# Patient Record
Sex: FEMALE | Race: WHITE | NOT HISPANIC OR LATINO | Employment: OTHER | URBAN - METROPOLITAN AREA
[De-identification: names, ages, dates, MRNs, and addresses within clinical notes are randomized per-mention and may not be internally consistent; named-entity substitution may affect disease eponyms.]

---

## 2017-08-29 ENCOUNTER — TRANSCRIBE ORDERS (OUTPATIENT)
Dept: SLEEP CENTER | Facility: CLINIC | Age: 47
End: 2017-08-29

## 2017-08-29 DIAGNOSIS — G47.411 NARCOLEPSY AND CATAPLEXY: Primary | ICD-10-CM

## 2017-10-03 ENCOUNTER — HOSPITAL ENCOUNTER (OUTPATIENT)
Dept: SLEEP CENTER | Facility: CLINIC | Age: 47
Discharge: HOME | End: 2017-10-03
Payer: COMMERCIAL

## 2017-10-03 ENCOUNTER — TRANSCRIBE ORDERS (OUTPATIENT)
Dept: SLEEP CENTER | Facility: CLINIC | Age: 47
End: 2017-10-03

## 2017-10-03 DIAGNOSIS — G47.411 NARCOLEPSY AND CATAPLEXY: ICD-10-CM

## 2017-10-03 DIAGNOSIS — G47.33 OSA (OBSTRUCTIVE SLEEP APNEA): Primary | ICD-10-CM

## 2017-10-03 NOTE — PROGRESS NOTES
Progress Note - [facility]   Donn Miller MRN: [id number]      Reason for Visit:    41 y  o female within narcolepsy, cataplexy and depression    Assessment:  The patient reports an improvement in her affect, however she still has difficulty getting out of bed in the morning  She feels lethargic and too overwhelmed to accomplish daily tasks  That is most likely her depression rather than narcolepsy  She seems well controlled on Xyrem and Adderall XR  Her last cataplexy was over 2 months ago at a high school football game  She remains on venlafaxine and clonazepam per Dr Luma Chao for her depression and anxiety  Plan:  Continue current regimen    Follow up:  Four months    History of Present Illness: The patient is narcolepsy was we reasonably well controlled on Xyrem and Adderall XR  She was involved in a lawsuit which caused a motion decompensation and resulted in increased frequency of cataplexy  That has since improved on her current regimen  Historical Information    Past Medical History: No past medical history on file  Past Surgical History: No past surgical history on file  Social History - see chart  History   Alcohol use: Not on file     History   Drug Use Not on file     History   Smoking Status    Not on file   Smokeless Tobacco    Not on file     Family History: No family history on file  Medications/Allergies:    No current outpatient prescriptions on file  Objective    Vital Signs:   See Chart    Physical Exam:    General: Alert, appropriate, cooperative, overweight    Head: NC/AT    Skin: Warm, dry    Neuro: No motor abnormalities, cranial nerves appear intact    Psych: Normal affect      Counseling / Coordination of Care  Total clinic time spent today 15 minutes  Greater than 50% of total time was spent with the patient and / or family counseling and / or coordination of care   A description of the counseling / coordination of care: treatment was discussed    PARUL Ragland    Board Certified Sleep Specialist

## 2018-05-05 ENCOUNTER — HOSPITAL ENCOUNTER (EMERGENCY)
Facility: HOSPITAL | Age: 48
Discharge: HOME | End: 2018-05-05
Attending: EMERGENCY MEDICINE
Payer: COMMERCIAL

## 2018-05-05 VITALS
HEIGHT: 65 IN | WEIGHT: 239.64 LBS | SYSTOLIC BLOOD PRESSURE: 118 MMHG | DIASTOLIC BLOOD PRESSURE: 62 MMHG | BODY MASS INDEX: 39.93 KG/M2 | OXYGEN SATURATION: 97 % | RESPIRATION RATE: 20 BRPM | TEMPERATURE: 98.5 F | HEART RATE: 94 BPM

## 2018-05-05 DIAGNOSIS — M25.519 SHOULDER PAIN: ICD-10-CM

## 2018-05-05 DIAGNOSIS — M75.22 BICEPS TENDONITIS ON LEFT: Primary | ICD-10-CM

## 2018-05-05 PROCEDURE — 99283 EMERGENCY DEPT VISIT LOW MDM: CPT

## 2018-05-05 PROCEDURE — 96372 THER/PROPH/DIAG INJ SC/IM: CPT

## 2018-05-05 RX ORDER — VENLAFAXINE 75 MG/1
75 TABLET ORAL 3 TIMES DAILY
COMMUNITY
End: 2019-05-03 | Stop reason: SDUPTHER

## 2018-05-05 RX ORDER — BUPROPION HYDROCHLORIDE 100 MG/1
100 TABLET ORAL 3 TIMES DAILY
COMMUNITY
End: 2019-01-02 | Stop reason: ALTCHOICE

## 2018-05-05 RX ORDER — CLONAZEPAM 0.5 MG/1
0.5 TABLET ORAL 4 TIMES DAILY
COMMUNITY
End: 2020-06-03

## 2018-05-05 RX ORDER — VENLAFAXINE 100 MG/1
100 TABLET ORAL 3 TIMES DAILY
COMMUNITY
End: 2019-05-03

## 2018-05-05 RX ORDER — NAPROXEN 500 MG/1
500 TABLET ORAL 2 TIMES DAILY WITH MEALS
Qty: 6 TABLET | Refills: 0 | Status: SHIPPED | OUTPATIENT
Start: 2018-05-05 | End: 2018-10-16

## 2018-05-05 RX ORDER — GABAPENTIN 300 MG/1
100 CAPSULE ORAL 4 TIMES DAILY
COMMUNITY
End: 2019-10-02

## 2018-05-05 RX ORDER — KETOROLAC TROMETHAMINE 30 MG/ML
15 INJECTION, SOLUTION INTRAMUSCULAR; INTRAVENOUS ONCE
Status: COMPLETED | OUTPATIENT
Start: 2018-05-05 | End: 2018-05-05

## 2018-05-05 RX ADMIN — KETOROLAC TROMETHAMINE 15 MG: 30 INJECTION, SOLUTION INTRAMUSCULAR at 17:14

## 2018-05-05 NOTE — DISCHARGE INSTRUCTIONS
Nephrology Tendinitis   WHAT YOU NEED TO KNOW:   What is tendinitis? Tendinitis is painful inflammation or breakdown of your tendons  It may also be called tendinopathy  Tendinitis often occurs in the knee, shoulder, ankle, hip, or elbow  What increases my risk for tendinitis? · Injury, overuse, or repeated movement of a joint     · Not warming up before exercise, or not resting enough between activities    · Use of shoes or exercise equipment that do not fit well    · Muscle weakness, balance problems, or poor posture  What are the signs and symptoms of tendinitis? You may have redness, pain, or swelling around your joint, tendon, or muscle  You may also have pain, stiffness, or decreased movement in your joint  How is tendinitis diagnosed? Your healthcare provider will check your range of motion of the affected joint  He may also lightly press on your tendon to check for pain  You may also need an ultrasound, x-ray, or MRI to show if you have tendinitis or another condition  How is tendinitis treated? · Pain medicines  such as NSAIDs and acetaminophen may decrease swelling and pain  These medicines are available without a doctor's order  Ask how much to take and when to take it  Follow directions  NSAIDs and acetaminophen may cause liver or kidney damage if not taken correctly  · Steroids  may be used to decrease pain and swelling  They may be given as a pill or as an injection into the affected area  Steroids are rarely used in children  · Surgery  may rarely be needed if other treatment does not work  How can I manage my symptoms? · Rest  your tendon as directed to help it heal  Ask your healthcare provider if you need to stop putting weight on your affected area  · Ice  helps decrease swelling and pain, and may help prevent tissue damage  Use an ice pack, or put crushed ice in a plastic bag   Cover it with a towel and place it on the affected area for 10 to 15 minutes every hour or as directed  · Support devices  such as a cane, splint, shoe insert, or brace may help reduce your pain  · Physical therapy  may be ordered by your healthcare provider  This may be used to teach you exercises to help improve movement and strength, and to decrease pain  You may also learn how to improve your posture, and how to lift or exercise correctly  How can I prevent tendinitis? · Warm up or stretch  before you exercise  · Exercise regularly  to strengthen the muscles around your joint  Ease into an exercise routine for the first 3 weeks to prevent another injury  Ask your healthcare provider about the best exercise plan for you  Rest fully between activities  · Use the right equipment  for sports and exercise  Wear braces or tape around weak joints as directed  When should I contact my healthcare provider? · You have increased pain even after you take medicine  · You have questions or concerns about your condition or care  When should I seek immediate help? · You have increased redness over the joint, or swelling in the joint  · You suddenly cannot move your joint  CARE AGREEMENT:   You have the right to help plan your care  Learn about your health condition and how it may be treated  Discuss treatment options with your caregivers to decide what care you want to receive  You always have the right to refuse treatment  The above information is an  only  It is not intended as medical advice for individual conditions or treatments  Talk to your doctor, nurse or pharmacist before following any medical regimen to see if it is safe and effective for you  © 2017 [employer]  Information is for End User's use only and may not be sold, redistributed or otherwise used for commercial purposes  All illustrations and images included in CareNotes® are the copyrighted property of [employer]  or [employer]

## 2018-05-05 NOTE — ED ATTENDING ATTESTATION
I, Miguel Angel Núñez DO, saw and evaluated the patient  I have discussed the patient with the resident/non-physician practitioner and agree with the resident's/non-physician practitioner's findings, Plan of Care, and MDM as documented in the resident's/non-physician practitioner's note, except where noted  All available labs and Radiology studies were reviewed  At this point I agree with the current assessment done in the Emergency Department  I have conducted an independent evaluation of this patient a history and physical is as follows:      Critical Care Time  CritCare Time    Procedures      61-year-old female presents with left shoulder pain  , more diffuse over the entire shoulder anterior superior and posterior aspects of it  , has been developing slowly over the past 2 weeks  No history of injury  No falls  Has appoint with Ortho in 2 days  , came here due to over-the-counter Tylenol and ibuprofen which have not been helping  Describes pain as dull constant worse with motion better with rest currently a 2/10  On examination, patient is nontoxic in appearance, patient has full active range of motion of all joints with the exception of the left shoulder where she has limited active range of motion due to discomfort  , patient has full passive range of motion of the left shoulder without any discomfort  , as soon as she engages actively above 90° with AB duction she has significant discomfort  Will treat as a tendinitis, will place on high-dose Naprosyn, patient to follow with Orthopedics  , discussed the possibility of steroids but due to psychiatric history I do think systemic steroids would be a poor choice    If   Orthopedics  feels that steroids are indicated I believe a local steroid injection would be more beneficial

## 2018-05-05 NOTE — ED PROVIDER NOTES
History  Chief Complaint   Patient presents with    Arm Pain     PT PRESENTS WITH LEFT SHOULDER/ARM PAIN X 2 WEEKS  STATES SHE WOKE UP WITH PAIN IN LEFT SHOULDER PAIN 2 WEEKS AGO, UNSURE IF SHE INJURED IT  STATES PAIN RADIATES TO LEFT SHOULDER BLADE AND DOWN ARM, MOSTLY LEFT BICEP  STATES TODAY HER ARM SUDDENLY "LOCKED UP" AND COULDNT MOVE FOR APPROX 10 MIN WITH "EXCRUCIATING PAIN" IN BICEP" DENIES CP, SOB     28-year-old female with no relevant medical history presents to the emergency department complaining of left shoulder and arm pain x2 weeks  States she does not recall the exact circumstances during the onset of the pain, but it has been gradually worsening since its onset  No injury, fall, including remote injury  Says that any movement of the shoulder makes the pain worse  The pain is in several locations: the posterior trapezius, the circumferential humeral head, and the coracoid process  She has been using Tylenol, Motrin, and Aleve at home  Today, she had an episode where her biceps "locked up" in the flexed position for about ten minutes resulting in "10/10 pain"  Applied some ice and was eventually able to extend her arm  Has an appt on Monday with ortho but was in such pain from the previous episode that she comes into the ED today  DDX includes but is not limited to:  Bursitis, tendinitis, muscle spasm, arthritis, rotator cuff tear            Prior to Admission Medications   Prescriptions Last Dose Informant Patient Reported? Taking?    Sodium Oxybate 500 MG/ML SOLN   Yes Yes   Sig: Take by mouth 2 (two) times a day   buPROPion (WELLBUTRIN) 100 mg tablet   Yes Yes   Sig: Take 100 mg by mouth 3 (three) times a day   clonazePAM (KlonoPIN) 0 5 mg tablet   Yes Yes   Sig: Take 0 5 mg by mouth 4 (four) times a day   gabapentin (NEURONTIN) 300 mg capsule   Yes Yes   Sig: Take 100 mg by mouth 4 (four) times a day   venlafaxine (EFFEXOR) 100 MG tablet   Yes Yes   Sig: Take 100 mg by mouth 3 (three) times a day   venlafaxine (EFFEXOR) 75 mg tablet   Yes Yes   Sig: Take 75 mg by mouth 3 (three) times a day      Facility-Administered Medications: None       Past Medical History:   Diagnosis Date    Anxiety     Depression     Narcolepsy        Past Surgical History:   Procedure Laterality Date     SECTION      CHOLECYSTECTOMY      KNEE ARTHROSCOPY Left        No family history on file  I have reviewed and agree with the history as documented  Social History   Substance Use Topics    Smoking status: Never Smoker    Smokeless tobacco: Never Used    Alcohol use Not on file        Review of Systems   Constitutional: Negative for chills and fever  HENT: Negative for congestion and rhinorrhea  Eyes: Negative for visual disturbance  Respiratory: Negative for cough, chest tightness and shortness of breath  Cardiovascular: Negative for chest pain  Gastrointestinal: Negative for abdominal pain  Musculoskeletal: Positive for arthralgias  Skin: Negative for rash and wound  Neurological: Negative for dizziness, light-headedness and headaches  All other systems reviewed and are negative  Physical Exam  ED Triage Vitals [18 1606]   Temperature Pulse Respirations Blood Pressure SpO2   98 5 °F (36 9 °C) 94 20 118/62 97 %      Temp Source Heart Rate Source Patient Position - Orthostatic VS BP Location FiO2 (%)   Oral Monitor Sitting Right arm --      Pain Score       2           Orthostatic Vital Signs  Vitals:    18 1606   BP: 118/62   Pulse: 94   Patient Position - Orthostatic VS: Sitting       Physical Exam   Constitutional: She is oriented to person, place, and time  She appears well-developed and well-nourished  No distress  HENT:   Head: Normocephalic and atraumatic  Right Ear: External ear normal    Left Ear: External ear normal    Eyes: EOM are normal  Pupils are equal, round, and reactive to light  Neck: Normal range of motion  Neck supple     Cardiovascular: Normal rate, regular rhythm and normal heart sounds  Exam reveals no gallop and no friction rub  No murmur heard  Pulmonary/Chest: Effort normal and breath sounds normal  No respiratory distress  She has no wheezes  She has no rales  Abdominal: Soft  Musculoskeletal:   No deformity to inspection of the left shoulder  No rash  Tender to palpation over the anterior, superior, posterior aspect of the shoulder, as well as the proximal and distal insertions of the biceps tendons, and the upper arm in general     Range of motion is limited to 90° of abduction, relatively normal flexion and extension of the shoulder  Flexion and extension of the elbow is guarded because of pain  Normal range of motion of the wrist and fingers  Muscle strength the left arm is 4/5 in all ranges of motion of the shoulder and elbow  Unable to do rotator cuff testing or Yergason's test because of pain  Neurological: She is alert and oriented to person, place, and time  Skin: Skin is warm and dry  She is not diaphoretic  Psychiatric:   Anxious       ED Medications  Medications   ketorolac (TORADOL) injection 15 mg (15 mg Intramuscular Given 5/5/18 1714)       Diagnostic Studies  Results Reviewed     None                 No orders to display              Procedures  Procedures       Phone Contacts  ED Phone Contact    ED Course                               MDM  Number of Diagnoses or Management Options  Biceps tendonitis on left: new and requires workup  Shoulder pain: new and requires workup  Diagnosis management comments: 30-year-old female presenting for a traumatic pain of the left shoulder x2 weeks  The location of her pain is suggestive of biceps tendonitis, plus or minus co existing muscle strain or rotator cuff tear  She has [facility] appointment on Monday  Therefore, would not prescribe systemic corticosteroids  Will give an IM dose of Toradol and 3 day course of Naprosyn    Patient understands and agreed with the treatment plan and had no questions  Patient Progress  Patient progress: improved    CritCare Time    Disposition  Final diagnoses:   Biceps tendonitis on left   Shoulder pain     Time reflects when diagnosis was documented in both MDM as applicable and the Disposition within this note     Time User Action Codes Description Comment    5/5/2018  4:50 PM Idalia Miguel Add [[id number]] Biceps tendonitis on left     5/5/2018  4:51 PM Idalia Miguel Add [[id number]] Shoulder pain       ED Disposition     ED Disposition Condition Comment    Discharge  Suyapa Edwards discharge to home/self care  Condition at discharge: Good        Follow-up Information     Follow up With Specialties Details Why Contact Info    Orthopedics   Keep your scheduled appointment         Discharge Medication List as of 5/5/2018  4:53 PM      START taking these medications    Details   naproxen (NAPROSYN) 500 mg tablet Take 1 tablet (500 mg total) by mouth 2 (two) times a day with meals for 3 days, Starting Sat 5/5/2018, Until Tue 5/8/2018, Print         CONTINUE these medications which have NOT CHANGED    Details   buPROPion (WELLBUTRIN) 100 mg tablet Take 100 mg by mouth 3 (three) times a day, Historical Med      clonazePAM (KlonoPIN) 0 5 mg tablet Take 0 5 mg by mouth 4 (four) times a day, Historical Med      gabapentin (NEURONTIN) 300 mg capsule Take 100 mg by mouth 4 (four) times a day, Historical Med      Sodium Oxybate 500 MG/ML SOLN Take by mouth 2 (two) times a day, Historical Med      !! venlafaxine (EFFEXOR) 100 MG tablet Take 100 mg by mouth 3 (three) times a day, Historical Med      !! venlafaxine (EFFEXOR) 75 mg tablet Take 75 mg by mouth 3 (three) times a day, Historical Med       !! - Potential duplicate medications found  Please discuss with provider  No discharge procedures on file      ED Provider  Electronically Signed by           Kristin Mancia PA-C  05/05/18 3432

## 2018-05-14 ENCOUNTER — TRANSCRIBE ORDERS (OUTPATIENT)
Dept: ADMINISTRATIVE | Facility: HOSPITAL | Age: 48
End: 2018-05-14

## 2018-05-14 DIAGNOSIS — M75.52 BURSITIS OF LEFT SHOULDER: Primary | ICD-10-CM

## 2018-05-16 ENCOUNTER — HOSPITAL ENCOUNTER (OUTPATIENT)
Dept: RADIOLOGY | Age: 48
Discharge: HOME | End: 2018-05-16
Payer: COMMERCIAL

## 2018-05-16 DIAGNOSIS — M75.52 BURSITIS OF LEFT SHOULDER: ICD-10-CM

## 2018-05-16 PROCEDURE — 73221 MRI JOINT UPR EXTREM W/O DYE: CPT

## 2018-08-06 ENCOUNTER — TRANSCRIBE ORDERS (OUTPATIENT)
Dept: ADMINISTRATIVE | Facility: HOSPITAL | Age: 48
End: 2018-08-06

## 2018-08-07 ENCOUNTER — TRANSCRIBE ORDERS (OUTPATIENT)
Dept: ADMINISTRATIVE | Facility: HOSPITAL | Age: 48
End: 2018-08-07

## 2018-08-07 DIAGNOSIS — M77.12 LATERAL EPICONDYLITIS OF LEFT ELBOW: Primary | ICD-10-CM

## 2018-08-09 ENCOUNTER — HOSPITAL ENCOUNTER (OUTPATIENT)
Dept: MRI IMAGING | Facility: HOSPITAL | Age: 48
Discharge: HOME | End: 2018-08-09
Payer: COMMERCIAL

## 2018-08-09 DIAGNOSIS — M77.12 LATERAL EPICONDYLITIS OF LEFT ELBOW: ICD-10-CM

## 2018-08-09 PROCEDURE — 73221 MRI JOINT UPR EXTREM W/O DYE: CPT

## 2018-09-27 ENCOUNTER — OFFICE VISIT (OUTPATIENT)
Dept: INTERNAL MEDICINE CLINIC | Facility: CLINIC | Age: 48
End: 2018-09-27
Payer: COMMERCIAL

## 2018-09-27 VITALS
OXYGEN SATURATION: 98 % | WEIGHT: 215.8 LBS | BODY MASS INDEX: 35.96 KG/M2 | TEMPERATURE: 97.9 F | RESPIRATION RATE: 18 BRPM | SYSTOLIC BLOOD PRESSURE: 132 MMHG | DIASTOLIC BLOOD PRESSURE: 76 MMHG | HEIGHT: 65 IN | HEART RATE: 84 BPM

## 2018-09-27 DIAGNOSIS — M75.22 LEFT BICIPITAL TENOSYNOVITIS: ICD-10-CM

## 2018-09-27 DIAGNOSIS — Z00.00 LABORATORY EXAMINATION ORDERED AS PART OF A ROUTINE GENERAL MEDICAL EXAMINATION: ICD-10-CM

## 2018-09-27 DIAGNOSIS — J45.20 MILD INTERMITTENT ASTHMA WITHOUT COMPLICATION: ICD-10-CM

## 2018-09-27 DIAGNOSIS — Z98.84 S/P GASTRIC BYPASS: ICD-10-CM

## 2018-09-27 DIAGNOSIS — Z12.31 ENCOUNTER FOR SCREENING MAMMOGRAM FOR BREAST CANCER: ICD-10-CM

## 2018-09-27 DIAGNOSIS — E66.09 CLASS 2 OBESITY DUE TO EXCESS CALORIES WITHOUT SERIOUS COMORBIDITY WITH BODY MASS INDEX (BMI) OF 35.0 TO 35.9 IN ADULT: ICD-10-CM

## 2018-09-27 DIAGNOSIS — F41.9 ANXIETY: ICD-10-CM

## 2018-09-27 DIAGNOSIS — N92.1 MENOMETRORRHAGIA: ICD-10-CM

## 2018-09-27 DIAGNOSIS — M25.50 ARTHRALGIA, UNSPECIFIED JOINT: Primary | ICD-10-CM

## 2018-09-27 DIAGNOSIS — G47.411 PRIMARY NARCOLEPSY WITH CATAPLEXY: ICD-10-CM

## 2018-09-27 PROBLEM — M75.20 BICIPITAL TENOSYNOVITIS: Status: ACTIVE | Noted: 2018-06-04

## 2018-09-27 PROBLEM — M77.10 LATERAL EPICONDYLITIS: Status: ACTIVE | Noted: 2018-07-09

## 2018-09-27 PROBLEM — K21.9 GERD (GASTROESOPHAGEAL REFLUX DISEASE): Status: ACTIVE | Noted: 2018-09-27

## 2018-09-27 PROBLEM — M75.50 BURSITIS OF SHOULDER: Status: ACTIVE | Noted: 2018-05-07

## 2018-09-27 PROBLEM — M77.00 MEDIAL EPICONDYLITIS: Status: ACTIVE | Noted: 2018-07-09

## 2018-09-27 PROBLEM — G43.109 MIGRAINE WITH AURA AND WITHOUT STATUS MIGRAINOSUS, NOT INTRACTABLE: Status: ACTIVE | Noted: 2018-09-27

## 2018-09-27 PROBLEM — J30.1 SEASONAL ALLERGIC RHINITIS DUE TO POLLEN: Status: ACTIVE | Noted: 2018-09-27

## 2018-09-27 PROBLEM — E66.812 CLASS 2 OBESITY DUE TO EXCESS CALORIES WITHOUT SERIOUS COMORBIDITY WITH BODY MASS INDEX (BMI) OF 35.0 TO 35.9 IN ADULT: Status: ACTIVE | Noted: 2018-09-27

## 2018-09-27 PROBLEM — R42 VERTIGO: Status: ACTIVE | Noted: 2018-09-27

## 2018-09-27 PROCEDURE — 99204 OFFICE O/P NEW MOD 45 MIN: CPT | Performed by: INTERNAL MEDICINE

## 2018-09-27 PROCEDURE — 3008F BODY MASS INDEX DOCD: CPT | Performed by: INTERNAL MEDICINE

## 2018-09-27 RX ORDER — ALBUTEROL SULFATE 90 UG/1
1 AEROSOL, METERED RESPIRATORY (INHALATION) EVERY 6 HOURS PRN
Qty: 1 INHALER | Refills: 0 | Status: SHIPPED | OUTPATIENT
Start: 2018-09-27 | End: 2019-05-03 | Stop reason: ALTCHOICE

## 2018-09-27 NOTE — PROGRESS NOTES
Assessment/Plan:    Arthralgia  Acute onset, no recent viral illness  Check rheum labs  Bicipital tenosynovitis  Follow up with ortho, minimal improvement with physical therapy  Anxiety  Sees psychiatry regularly  On bupropion, venlafaxine, gabapentin and clonazepam     Menometrorrhagia  Encouraged to see gynecology  S/P gastric bypass  Check vitamin labs  Primary narcolepsy with cataplexy  Followed by neurology  On Xyrem  GERD (gastroesophageal reflux disease)  Takes Prilosec as needed  Seasonal allergic rhinitis due to pollen  Takes antihistamine as needed  Migraine with aura and without status migrainosus, not intractable  Headaches about 1 to 2 times a month, may be related to menstrual period  Diagnoses and all orders for this visit:    Arthralgia, unspecified joint  -     CBC and differential  -     Comprehensive metabolic panel  -     MILAGRO Screen w/ Reflex to Titer/Pattern  -     C-reactive protein  -     RF Screen w/ Reflex to Titer  -     Sedimentation rate, automated    Menometrorrhagia  -     Ambulatory referral to Obstetrics / Gynecology; Future    Left bicipital tenosynovitis    Anxiety    Primary narcolepsy with cataplexy    Class 2 obesity due to excess calories without serious comorbidity with body mass index (BMI) of 35 0 to 35 9 in adult  -     Lipid panel  -     TSH, 3rd generation with Free T4 reflex    Laboratory examination ordered as part of a routine general medical examination  -     CBC and differential  -     Comprehensive metabolic panel  -     Lipid panel  -     TSH, 3rd generation with Free T4 reflex    Encounter for screening mammogram for breast cancer  -     Mammo screening bilateral w cad;  Future    S/P gastric bypass  -     Ferritin  -     Folate  -     Hemoglobin A1C  -     Iron  -     TIBC  -     Vitamin A  -     Vitamin B12  -     Vitamin D 25 hydroxy    Mild intermittent asthma without complication  -     albuterol (VENTOLIN HFA) 90 mcg/act inhaler; Inhale 1 puff every 6 (six) hours as needed for wheezing      Follow up in 2 months or as needed  Subjective:      Patient ID: Stephanie Barth is a 57 y o  female  Ms  Jaxson Sheth complains of vaginal bleeding  It started about 2 weeks ago, noticed bleeding was heavy with clots   She initially had lower abdominal cramping, this has subsided  Bleeding has slowed down, but still using about 3 pads a day  Her regular menstrual period usually lasts about 3 to 4 days only  She has not seen gynecology  She also complains of joint aches and pains  This started less than 2 months ago, reports that she feels "severe pain" all over  She complains of bilateral shoulder, hip, knee and elbow pain  She has seen Ortho recently for elbow and shoulder pains  She was diagnosed with bicipital tendinitis, medial and lateral epicondylitis  She went to physical therapy which she reports did not really help  She has had as injection for her left shoulder  She reports that her hips and knees started to become more achy, feels it is getting worse  She denies any joint swelling or redness  She also reports that it was thought that she had gout, in her right knee  No fluid was removed from any joint  She was diagnosed with narcolepsy about 7 years ago  She reports that she recently fell out of her bed  No injury  She sees neurology regularly  She experiences occasional heartburn symptoms, would take Prilosec as needed  She has seasonal allergies, takes Zyrtec as needed only  The following portions of the patient's history were reviewed and updated as appropriate: allergies, current medications, past medical history, past social history and problem list     Review of Systems   Constitutional: Negative for appetite change and fatigue  HENT: Negative for congestion, ear pain and postnasal drip  Eyes: Negative for visual disturbance  Respiratory: Negative for cough and shortness of breath      Cardiovascular: Negative for chest pain and leg swelling  Gastrointestinal: Negative for abdominal pain, constipation and diarrhea  Genitourinary: Positive for vaginal bleeding  Negative for dysuria, frequency, urgency and vaginal discharge  Musculoskeletal: Positive for arthralgias  Negative for joint swelling, myalgias, neck pain and neck stiffness  Skin: Negative for rash and wound  Neurological: Negative for dizziness, numbness and headaches  Hematological: Does not bruise/bleed easily  Psychiatric/Behavioral: Negative for confusion  The patient is nervous/anxious  Objective:      /76   Pulse 84   Temp 97 9 °F (36 6 °C)   Resp 18   Ht 5' 5" (1 651 m)   Wt 97 9 kg (215 lb 12 8 oz)   SpO2 98%   BMI 35 91 kg/m²          Physical Exam   Constitutional: She is oriented to person, place, and time  She appears well-developed and well-nourished  HENT:   Head: Normocephalic and atraumatic  Right Ear: Tympanic membrane and external ear normal    Left Ear: Tympanic membrane and external ear normal    Nose: Nose normal    Mouth/Throat: Uvula is midline, oropharynx is clear and moist and mucous membranes are normal    Eyes: Pupils are equal, round, and reactive to light  Conjunctivae are normal    Neck: Neck supple  No thyroid mass present  Cardiovascular: Normal rate, regular rhythm and normal heart sounds  No edema  Pulmonary/Chest: Effort normal and breath sounds normal  She has no wheezes  She has no rhonchi  Abdominal: Soft  Bowel sounds are normal  There is no tenderness  No hernia  Musculoskeletal: Normal range of motion  Right shoulder: She exhibits tenderness  Left shoulder: She exhibits tenderness and pain  Right knee: She exhibits normal range of motion and no swelling  Tenderness found  Medial joint line tenderness noted  Left knee: Tenderness found  Medial joint line tenderness noted     tenderness AC joint bilateral   Lymphadenopathy:     She has no cervical adenopathy  Right: No supraclavicular adenopathy present  Left: No supraclavicular adenopathy present  Neurological: She is alert and oriented to person, place, and time  No cranial nerve deficit  Skin: Skin is warm  No rash noted  Psychiatric: She has a normal mood and affect  Her behavior is normal    Nursing note and vitals reviewed  Reviewed available records

## 2018-10-03 ENCOUNTER — TELEPHONE (OUTPATIENT)
Dept: SLEEP CENTER | Facility: CLINIC | Age: 48
End: 2018-10-03

## 2018-10-04 ENCOUNTER — TELEPHONE (OUTPATIENT)
Dept: INTERNAL MEDICINE CLINIC | Facility: CLINIC | Age: 48
End: 2018-10-04

## 2018-10-04 ENCOUNTER — APPOINTMENT (OUTPATIENT)
Dept: LAB | Facility: CLINIC | Age: 48
End: 2018-10-04
Payer: COMMERCIAL

## 2018-10-04 ENCOUNTER — TRANSCRIBE ORDERS (OUTPATIENT)
Dept: ADMINISTRATIVE | Age: 48
End: 2018-10-04

## 2018-10-04 ENCOUNTER — HOSPITAL ENCOUNTER (EMERGENCY)
Facility: HOSPITAL | Age: 48
Discharge: HOME | End: 2018-10-05
Attending: EMERGENCY MEDICINE
Payer: COMMERCIAL

## 2018-10-04 DIAGNOSIS — D64.89 ANEMIA DUE TO OTHER CAUSE, NOT CLASSIFIED: Primary | ICD-10-CM

## 2018-10-04 DIAGNOSIS — N92.1 MENOMETRORRHAGIA: ICD-10-CM

## 2018-10-04 PROBLEM — D64.9 ANEMIA: Status: ACTIVE | Noted: 2018-10-04

## 2018-10-04 LAB
25(OH)D3 SERPL-MCNC: 19.2 NG/ML (ref 30–100)
ABO GROUP BLD: NORMAL
ALBUMIN SERPL BCP-MCNC: 3.1 G/DL (ref 3.5–5)
ALP SERPL-CCNC: 87 U/L (ref 46–116)
ALT SERPL W P-5'-P-CCNC: 17 U/L (ref 12–78)
ANION GAP SERPL CALCULATED.3IONS-SCNC: 6 MMOL/L (ref 4–13)
AST SERPL W P-5'-P-CCNC: 12 U/L (ref 5–45)
BASOPHILS # BLD AUTO: 0.03 THOUSANDS/ΜL (ref 0–0.1)
BASOPHILS # BLD AUTO: 0.03 THOUSANDS/ΜL (ref 0–0.1)
BASOPHILS NFR BLD AUTO: 1 % (ref 0–1)
BASOPHILS NFR BLD AUTO: 1 % (ref 0–1)
BILIRUB SERPL-MCNC: 0.45 MG/DL (ref 0.2–1)
BLD GP AB SCN SERPL QL: NEGATIVE
BUN SERPL-MCNC: 12 MG/DL (ref 5–25)
CALCIUM SERPL-MCNC: 8.1 MG/DL (ref 8.3–10.1)
CHLORIDE SERPL-SCNC: 105 MMOL/L (ref 100–108)
CHOLEST SERPL-MCNC: 167 MG/DL (ref 50–200)
CO2 SERPL-SCNC: 26 MMOL/L (ref 21–32)
CREAT SERPL-MCNC: 0.72 MG/DL (ref 0.6–1.3)
CRP SERPL QL: <3 MG/L
EOSINOPHIL # BLD AUTO: 0.02 THOUSAND/ΜL (ref 0–0.61)
EOSINOPHIL # BLD AUTO: 0.02 THOUSAND/ΜL (ref 0–0.61)
EOSINOPHIL NFR BLD AUTO: 0 % (ref 0–6)
EOSINOPHIL NFR BLD AUTO: 1 % (ref 0–6)
ERYTHROCYTE [DISTWIDTH] IN BLOOD BY AUTOMATED COUNT: 21.4 % (ref 11.6–15.1)
ERYTHROCYTE [DISTWIDTH] IN BLOOD BY AUTOMATED COUNT: 21.5 % (ref 11.6–15.1)
ERYTHROCYTE [SEDIMENTATION RATE] IN BLOOD: 11 MM/HOUR (ref 0–20)
FERRITIN SERPL-MCNC: 2 NG/ML (ref 8–388)
FOLATE SERPL-MCNC: 11.1 NG/ML (ref 3.1–17.5)
GFR SERPL CREATININE-BSD FRML MDRD: 101 ML/MIN/1.73SQ M
GLUCOSE P FAST SERPL-MCNC: 70 MG/DL (ref 65–99)
HCT VFR BLD AUTO: 23.4 % (ref 34.8–46.1)
HCT VFR BLD AUTO: 23.8 % (ref 34.8–46.1)
HDLC SERPL-MCNC: 63 MG/DL (ref 40–60)
HGB BLD-MCNC: 6.2 G/DL (ref 11.5–15.4)
HGB BLD-MCNC: 6.3 G/DL (ref 11.5–15.4)
IMM GRANULOCYTES # BLD AUTO: 0.01 THOUSAND/UL (ref 0–0.2)
IMM GRANULOCYTES # BLD AUTO: 0.01 THOUSAND/UL (ref 0–0.2)
IMM GRANULOCYTES NFR BLD AUTO: 0 % (ref 0–2)
IMM GRANULOCYTES NFR BLD AUTO: 0 % (ref 0–2)
IRON SERPL-MCNC: 13 UG/DL (ref 50–170)
LDLC SERPL CALC-MCNC: 89 MG/DL (ref 0–100)
LYMPHOCYTES # BLD AUTO: 1.45 THOUSANDS/ΜL (ref 0.6–4.47)
LYMPHOCYTES # BLD AUTO: 1.54 THOUSANDS/ΜL (ref 0.6–4.47)
LYMPHOCYTES NFR BLD AUTO: 34 % (ref 14–44)
LYMPHOCYTES NFR BLD AUTO: 36 % (ref 14–44)
MCH RBC QN AUTO: 17.5 PG (ref 26.8–34.3)
MCH RBC QN AUTO: 17.8 PG (ref 26.8–34.3)
MCHC RBC AUTO-ENTMCNC: 26.5 G/DL (ref 31.4–37.4)
MCHC RBC AUTO-ENTMCNC: 26.5 G/DL (ref 31.4–37.4)
MCV RBC AUTO: 66 FL (ref 82–98)
MCV RBC AUTO: 67 FL (ref 82–98)
MONOCYTES # BLD AUTO: 0.43 THOUSAND/ΜL (ref 0.17–1.22)
MONOCYTES # BLD AUTO: 0.46 THOUSAND/ΜL (ref 0.17–1.22)
MONOCYTES NFR BLD AUTO: 12 % (ref 4–12)
MONOCYTES NFR BLD AUTO: 9 % (ref 4–12)
NEUTROPHILS # BLD AUTO: 2.02 THOUSANDS/ΜL (ref 1.85–7.62)
NEUTROPHILS # BLD AUTO: 2.53 THOUSANDS/ΜL (ref 1.85–7.62)
NEUTS SEG NFR BLD AUTO: 50 % (ref 43–75)
NEUTS SEG NFR BLD AUTO: 56 % (ref 43–75)
NONHDLC SERPL-MCNC: 104 MG/DL
NRBC BLD AUTO-RTO: 0 /100 WBCS
NRBC BLD AUTO-RTO: 0 /100 WBCS
PLATELET # BLD AUTO: 508 THOUSANDS/UL (ref 149–390)
PLATELET # BLD AUTO: 535 THOUSANDS/UL (ref 149–390)
PMV BLD AUTO: 10.9 FL (ref 8.9–12.7)
PMV BLD AUTO: 11.2 FL (ref 8.9–12.7)
POTASSIUM SERPL-SCNC: 3.7 MMOL/L (ref 3.5–5.3)
PROT SERPL-MCNC: 6.7 G/DL (ref 6.4–8.2)
RBC # BLD AUTO: 3.54 MILLION/UL (ref 3.81–5.12)
RBC # BLD AUTO: 3.54 MILLION/UL (ref 3.81–5.12)
RH BLD: NEGATIVE
SODIUM SERPL-SCNC: 137 MMOL/L (ref 136–145)
SPECIMEN EXPIRATION DATE: NORMAL
TIBC SERPL-MCNC: 395 UG/DL (ref 250–450)
TRIGL SERPL-MCNC: 74 MG/DL
TSH SERPL DL<=0.05 MIU/L-ACNC: 1.59 UIU/ML (ref 0.36–3.74)
VIT B12 SERPL-MCNC: 193 PG/ML (ref 100–900)
WBC # BLD AUTO: 3.99 THOUSAND/UL (ref 4.31–10.16)
WBC # BLD AUTO: 4.56 THOUSAND/UL (ref 4.31–10.16)

## 2018-10-04 PROCEDURE — 84590 ASSAY OF VITAMIN A: CPT | Performed by: INTERNAL MEDICINE

## 2018-10-04 PROCEDURE — 80053 COMPREHEN METABOLIC PANEL: CPT | Performed by: INTERNAL MEDICINE

## 2018-10-04 PROCEDURE — 36430 TRANSFUSION BLD/BLD COMPNT: CPT

## 2018-10-04 PROCEDURE — 83036 HEMOGLOBIN GLYCOSYLATED A1C: CPT | Performed by: INTERNAL MEDICINE

## 2018-10-04 PROCEDURE — 86850 RBC ANTIBODY SCREEN: CPT | Performed by: PHYSICIAN ASSISTANT

## 2018-10-04 PROCEDURE — 84443 ASSAY THYROID STIM HORMONE: CPT | Performed by: INTERNAL MEDICINE

## 2018-10-04 PROCEDURE — 85652 RBC SED RATE AUTOMATED: CPT | Performed by: INTERNAL MEDICINE

## 2018-10-04 PROCEDURE — 83540 ASSAY OF IRON: CPT | Performed by: INTERNAL MEDICINE

## 2018-10-04 PROCEDURE — 85025 COMPLETE CBC W/AUTO DIFF WBC: CPT | Performed by: EMERGENCY MEDICINE

## 2018-10-04 PROCEDURE — 83550 IRON BINDING TEST: CPT | Performed by: INTERNAL MEDICINE

## 2018-10-04 PROCEDURE — 85025 COMPLETE CBC W/AUTO DIFF WBC: CPT | Performed by: INTERNAL MEDICINE

## 2018-10-04 PROCEDURE — 86430 RHEUMATOID FACTOR TEST QUAL: CPT | Performed by: INTERNAL MEDICINE

## 2018-10-04 PROCEDURE — 86140 C-REACTIVE PROTEIN: CPT | Performed by: INTERNAL MEDICINE

## 2018-10-04 PROCEDURE — 86900 BLOOD TYPING SEROLOGIC ABO: CPT | Performed by: PHYSICIAN ASSISTANT

## 2018-10-04 PROCEDURE — 96360 HYDRATION IV INFUSION INIT: CPT

## 2018-10-04 PROCEDURE — 80061 LIPID PANEL: CPT | Performed by: INTERNAL MEDICINE

## 2018-10-04 PROCEDURE — 86038 ANTINUCLEAR ANTIBODIES: CPT | Performed by: INTERNAL MEDICINE

## 2018-10-04 PROCEDURE — 36415 COLL VENOUS BLD VENIPUNCTURE: CPT | Performed by: INTERNAL MEDICINE

## 2018-10-04 PROCEDURE — 82306 VITAMIN D 25 HYDROXY: CPT | Performed by: INTERNAL MEDICINE

## 2018-10-04 PROCEDURE — P9016 RBC LEUKOCYTES REDUCED: HCPCS

## 2018-10-04 PROCEDURE — 82728 ASSAY OF FERRITIN: CPT | Performed by: INTERNAL MEDICINE

## 2018-10-04 PROCEDURE — 99284 EMERGENCY DEPT VISIT MOD MDM: CPT

## 2018-10-04 PROCEDURE — 82607 VITAMIN B-12: CPT | Performed by: INTERNAL MEDICINE

## 2018-10-04 PROCEDURE — 82746 ASSAY OF FOLIC ACID SERUM: CPT | Performed by: INTERNAL MEDICINE

## 2018-10-04 PROCEDURE — 86920 COMPATIBILITY TEST SPIN: CPT

## 2018-10-04 PROCEDURE — 86901 BLOOD TYPING SEROLOGIC RH(D): CPT | Performed by: PHYSICIAN ASSISTANT

## 2018-10-04 RX ADMIN — SODIUM CHLORIDE 1000 ML: 0.9 INJECTION, SOLUTION INTRAVENOUS at 18:47

## 2018-10-04 NOTE — TELEPHONE ENCOUNTER
Lab result: you are very anemic, hemoglobin is very low  It may be because of your recent heavy vaginal bleeding  Recommend to go to the ER asap

## 2018-10-04 NOTE — ED PROVIDER NOTES
History  Chief Complaint   Patient presents with    Anemia     Pt seen by new doctor today, sent for bloodwork, told to come to ER for anemia  Pt feeling weak  Claudia Tan ([id number]) is a 57 y o   female Adult patient, presenting to the Emergency Department, accompanied by , who presents with a chief complaint of Patient presents with: Anemia: Pt seen by new doctor today, sent for bloodwork, told to come to ER for anemia  Pt feeling weak  Anemia  -Patient had labs drawn at 11am  -Was subsequently called by PCP and told to come to the ER, as her Hgb was notably low  -Patients  states that, to her, she does not appear pale  -Patient had vaginal bleeding lasting for 2 weeks, that ended 1 5 weeks ago, none since  -No dark or bright red stools  -No pain with urination  -Patient has chronic joint pain, which is currently being evaluated by ortho  -Patient has a "tremor", which started 1 5-2 months ago, which is being evaluated by psych, as a potential medication side effect  -No history of anemia  -No symptoms of Pica    Medications: Medications, as per nursing MAR  Allergies: No reported food allergies, No reported environmental allergies, codeine and morphine  Overnight Hospitalizations: As noted in HPI  Vaccinations: Vaccinations UTD, as per Patient  Past Medical History: Past Medical History Includes:  Narcolepsy, depression, asthma, anxiety  Past Surgical History: No relevant past surgical history    Code Status: No Order              Prior to Admission Medications   Prescriptions Last Dose Informant Patient Reported? Taking?    Sodium Oxybate 500 MG/ML SOLN  Self Yes Yes   Sig: Take by mouth 2 (two) times a day   albuterol (VENTOLIN HFA) 90 mcg/act inhaler  Self No Yes   Sig: Inhale 1 puff every 6 (six) hours as needed for wheezing   buPROPion (WELLBUTRIN) 100 mg tablet  Self Yes Yes   Sig: Take 100 mg by mouth 3 (three) times a day   clonazePAM (KlonoPIN) 0 5 mg tablet Self Yes Yes   Sig: Take 0 5 mg by mouth 4 (four) times a day   gabapentin (NEURONTIN) 300 mg capsule  Self Yes Yes   Sig: Take 100 mg by mouth 4 (four) times a day   naproxen (NAPROSYN) 500 mg tablet   No Yes   Sig: Take 1 tablet (500 mg total) by mouth 2 (two) times a day with meals for 3 days   venlafaxine (EFFEXOR) 100 MG tablet  Self Yes Yes   Sig: Take 100 mg by mouth 3 (three) times a day   venlafaxine (EFFEXOR) 75 mg tablet  Self Yes Yes   Sig: Take 75 mg by mouth 3 (three) times a day      Facility-Administered Medications: None       Past Medical History:   Diagnosis Date    Anxiety     Asthma     childhood    Depression     Narcolepsy        Past Surgical History:   Procedure Laterality Date     SECTION      CHOLECYSTECTOMY      KNEE ARTHROSCOPY Left        Family History   Problem Relation Age of Onset    Hypertension Mother     Depression Mother     Atrial fibrillation Mother     Hypertension Father     Depression Father     Heart failure Father     Diabetes Maternal Grandmother     Stroke Maternal Grandmother     Lung cancer Paternal Grandfather     Brain cancer Maternal Grandfather     Alcohol abuse Neg Hx     Substance Abuse Neg Hx     Mental illness Neg Hx      I have reviewed and agree with the history as documented  Social History   Substance Use Topics    Smoking status: Never Smoker    Smokeless tobacco: Never Used    Alcohol use No        Review of Systems  Review of Systems: The Patient/Parent Denies the following: Negative, Except as noted in HPI  Physical Exam  Physical Exam  General: 57 y o  female patient, who appears their stated age, in mild distress  Skin:  Notably pale skin  No rashes, masses, or lesions noted  HEENT: Atraumatic & Normocephalic  External ears normal, with no noted abnormalities or deformities  Bilateral canals examined, without noted edema or discomfort  No pain while pulling the tragus   TM well visualized bilaterally, with no noted obstruction, effusion, erythema, or air fluid levels  No noted enlargement of the mastoid processes bilaterally  EOMI, PERRL, Conjunctiva without injection bilaterally  No conjunctival drainage noted bilaterally  Examination of the conjunctiva reveal mild pallor appearance, examination of the conjunctiva mucosa are notably white in appearance  Nares patent bilaterally, with no noted obstructions, erythema, or drainage  No noted rhinorrhea  Pharynx well visualized, with no exudate noted in the posterior pharynx  Tonsils are not enlarged  Gingival surfaces are within normal limits  Neck: Soft, supple, and non-tender  No enlargement of the anterior cervical, posterior cervical, or occipital lymph notes  Cardiac: Regular rate and rhythm, with no noted murmurs, rubs, or gallops  Pulmonary: Normal Appearance  Clear to auscultation, with no noted rales, rhonchi, or wheezes  Abdomen: Normal appearance  Dull to palpation, except over the gastric bubble, which was mildly tympanic  Bowel sounds were within normal limits, with no noted high pitch sounds heard  Negative Mcmillan sign  No pain with palpation at [facility]  MSK: Joint ROM grossly normal, actively and passively, to all extremities  No noted joint swelling  Normal Gait  Neuro: Cranial nerves 2-12 grossly intact  Normal sensation grossly  Psych: Normal affect and responsiveness         Vital Signs  ED Triage Vitals   Temperature Pulse Respirations Blood Pressure SpO2   10/04/18 1802 10/04/18 1802 10/04/18 1802 10/04/18 1802 10/04/18 1802   99 3 °F (37 4 °C) 92 18 134/75 100 %      Temp Source Heart Rate Source Patient Position - Orthostatic VS BP Location FiO2 (%)   10/04/18 2022 10/04/18 2247 10/04/18 2247 10/04/18 2247 --   Oral Monitor Lying Right arm       Pain Score       10/04/18 1802       No Pain           Vitals:    10/04/18 2247 10/04/18 2256 10/05/18 0021 10/05/18 0130   BP: 117/58 123/56 108/63 129/65   Pulse: 86 84 85 90   Patient Position - Orthostatic VS: Lying  Sitting Sitting       Visual Acuity      ED Medications  Medications   sodium chloride 0 9 % bolus 1,000 mL (0 mL Intravenous Stopped 10/4/18 2000)       Diagnostic Studies  Results Reviewed     Procedure Component Value Units Date/Time    Hemoglobin and hematocrit, blood [[id number]]  (Abnormal) Collected:  10/05/18 0053    Lab Status:  Final result Specimen:  Blood from Arm, Left Updated:  10/05/18 0058     Hemoglobin 7 8 (L) g/dL      Hematocrit 27 4 (L) %     CBC and differential [[id number]]  (Abnormal) Collected:  10/04/18 2000    Lab Status:  Final result Specimen:  Blood from Arm, Left Updated:  10/04/18 2027     WBC 3 99 (L) Thousand/uL      RBC 3 54 (L) Million/uL      Hemoglobin 6 2 (LL) g/dL      Hematocrit 23 4 (L) %      MCV 66 (L) fL      MCH 17 5 (L) pg      MCHC 26 5 (L) g/dL      RDW 21 4 (H) %      MPV 10 9 fL      Platelets 161 (H) Thousands/uL      nRBC 0 /100 WBCs      Neutrophils Relative 50 %      Immat GRANS % 0 %      Lymphocytes Relative 36 %      Monocytes Relative 12 %      Eosinophils Relative 1 %      Basophils Relative 1 %      Neutrophils Absolute 2 02 Thousands/µL      Immature Grans Absolute 0 01 Thousand/uL      Lymphocytes Absolute 1 45 Thousands/µL      Monocytes Absolute 0 46 Thousand/µL      Eosinophils Absolute 0 02 Thousand/µL      Basophils Absolute 0 03 Thousands/µL                  No orders to display              Procedures  Procedures       Phone Contacts  ED Phone Contact    ED Course  ED Course as of Oct 07 0429   Thu Oct 04, 2018   2205 Patient re-evaluated, currently stable, receiving 1st unit PRBC  Vital signs within normal limits, patient with no noted pruritus or other indications of transfusion reaction                                  MDM  Number of Diagnoses or Management Options  Anemia due to other cause, not classified: new and requires workup  Diagnosis management comments: Most likely diagnosis is anemia of unspecified origin  Primary considerations and diagnosis include the patient's presence of anemia, as diagnosed by her outpatient care provider  In addition, evaluation in the emergency department verified this anemia,, which was significant, and required PRBC infusion  In addition, the patient's presenting symptoms do not include those of GI bleed, pulmonary hemorrhage, or neurologic symptoms, which makes pulmonary, GI, and neurologic causes of these blood loss notably unlikely  Addition, detailed examination of the patient's skin reveals no tight compartments, no noted ecchymoses, and no evidence of recent injury  Finally, after administration of 2 units of PRBC, the patient's hemoglobin increased by 1 4, which is notably less than expected  Following, a rectal exam with Hemoccult was performed, and was noted to be negative  Conversation was made with the attending physician who agreed that the patient should undergo more detailed evaluation workup for this acute anemia, but this could be on an outpatient basis, as the patient is currently hemodynamically stable  As such, the patient was instructed to follow up with her outpatient primary care provider, who is aware of her presenting symptoms and level of hemoglobin, as he was the person who advised the patient to seek emergency care in the emergency department         Amount and/or Complexity of Data Reviewed  Clinical lab tests: ordered and reviewed  Tests in the radiology section of CPT®: ordered and reviewed  Tests in the medicine section of CPT®: reviewed and ordered  Discussion of test results with the performing providers: yes  Decide to obtain previous medical records or to obtain history from someone other than the patient: yes  Obtain history from someone other than the patient: yes  Review and summarize past medical records: yes  Discuss the patient with other providers: yes  Independent visualization of images, tracings, or specimens: yes    Risk of Complications, Morbidity, and/or Mortality  Presenting problems: moderate  Diagnostic procedures: moderate  Management options: moderate    Patient Progress  Patient progress: improved    CritCare Time    Disposition  Final diagnoses:   Anemia due to other cause, not classified     Time reflects when diagnosis was documented in both MDM as applicable and the Disposition within this note     Time User Action Codes Description Comment    10/5/2018  1:41 AM Holly Navarrete Add [D64 89] Anemia due to other cause, not classified       ED Disposition     ED Disposition Condition Comment    Discharge  Caitlin Salomon discharge to home/self care      Condition at discharge: Good        Follow-up Information     Follow up With Specialties Details Why Contact Info    Maddy Aguirre MD Internal Medicine In 3 days  [address]  [phone]            Discharge Medication List as of 10/5/2018  1:42 AM      START taking these medications    Details   ferrous sulfate 325 (65 Fe) mg tablet Take 1 tablet (325 mg total) by mouth daily, Starting Fri 10/5/2018, Normal         CONTINUE these medications which have NOT CHANGED    Details   albuterol (VENTOLIN HFA) 90 mcg/act inhaler Inhale 1 puff every 6 (six) hours as needed for wheezing, Starting Thu 9/27/2018, Normal      buPROPion (WELLBUTRIN) 100 mg tablet Take 100 mg by mouth 3 (three) times a day, Historical Med      clonazePAM (KlonoPIN) 0 5 mg tablet Take 0 5 mg by mouth 4 (four) times a day, Historical Med      gabapentin (NEURONTIN) 300 mg capsule Take 100 mg by mouth 4 (four) times a day, Historical Med      naproxen (NAPROSYN) 500 mg tablet Take 1 tablet (500 mg total) by mouth 2 (two) times a day with meals for 3 days, Starting Sat 5/5/2018, Until Thu 10/4/2018, Print      Sodium Oxybate 500 MG/ML SOLN Take by mouth 2 (two) times a day, Historical Med      !! venlafaxine (EFFEXOR) 100 MG tablet Take 100 mg by mouth 3 (three) times a day, Historical Med      !! venlafaxine (EFFEXOR) 75 mg tablet Take 75 mg by mouth 3 (three) times a day, Historical Med       !! - Potential duplicate medications found  Please discuss with provider  No discharge procedures on file      ED Provider  Electronically Signed by           Otis Amador PA-C  10/07/18 0436

## 2018-10-05 ENCOUNTER — TRANSCRIBE ORDERS (OUTPATIENT)
Dept: LAB | Facility: CLINIC | Age: 48
End: 2018-10-05

## 2018-10-05 ENCOUNTER — APPOINTMENT (OUTPATIENT)
Dept: LAB | Facility: CLINIC | Age: 48
End: 2018-10-05
Payer: COMMERCIAL

## 2018-10-05 ENCOUNTER — TELEPHONE (OUTPATIENT)
Dept: INTERNAL MEDICINE CLINIC | Facility: CLINIC | Age: 48
End: 2018-10-05

## 2018-10-05 ENCOUNTER — OFFICE VISIT (OUTPATIENT)
Dept: INTERNAL MEDICINE CLINIC | Facility: CLINIC | Age: 48
End: 2018-10-05
Payer: COMMERCIAL

## 2018-10-05 VITALS
BODY MASS INDEX: 35.96 KG/M2 | TEMPERATURE: 98 F | RESPIRATION RATE: 18 BRPM | HEART RATE: 88 BPM | WEIGHT: 215.8 LBS | HEIGHT: 65 IN | DIASTOLIC BLOOD PRESSURE: 68 MMHG | OXYGEN SATURATION: 98 % | SYSTOLIC BLOOD PRESSURE: 102 MMHG

## 2018-10-05 VITALS
HEIGHT: 65 IN | OXYGEN SATURATION: 100 % | WEIGHT: 215 LBS | SYSTOLIC BLOOD PRESSURE: 129 MMHG | DIASTOLIC BLOOD PRESSURE: 65 MMHG | RESPIRATION RATE: 18 BRPM | BODY MASS INDEX: 35.82 KG/M2 | HEART RATE: 90 BPM | TEMPERATURE: 98.3 F

## 2018-10-05 DIAGNOSIS — D64.89 ANEMIA DUE TO OTHER CAUSE, NOT CLASSIFIED: Primary | ICD-10-CM

## 2018-10-05 DIAGNOSIS — E53.8 VITAMIN B12 DEFICIENCY: ICD-10-CM

## 2018-10-05 PROBLEM — D50.9 IRON DEFICIENCY ANEMIA: Status: ACTIVE | Noted: 2018-10-05

## 2018-10-05 LAB
ABO GROUP BLD BPU: NORMAL
ABO GROUP BLD BPU: NORMAL
BASOPHILS # BLD AUTO: 0.04 THOUSANDS/ΜL (ref 0–0.1)
BASOPHILS NFR BLD AUTO: 1 % (ref 0–1)
BPU ID: NORMAL
BPU ID: NORMAL
CROSSMATCH: NORMAL
CROSSMATCH: NORMAL
EOSINOPHIL # BLD AUTO: 0.01 THOUSAND/ΜL (ref 0–0.61)
EOSINOPHIL NFR BLD AUTO: 0 % (ref 0–6)
ERYTHROCYTE [DISTWIDTH] IN BLOOD BY AUTOMATED COUNT: 24.2 % (ref 11.6–15.1)
HBA1C MFR BLD HPLC: 5.7 %
HCT VFR BLD AUTO: 27.4 % (ref 34.8–46.1)
HCT VFR BLD AUTO: 28.8 % (ref 34.8–46.1)
HGB BLD-MCNC: 7.8 G/DL (ref 11.5–15.4)
HGB BLD-MCNC: 8.3 G/DL (ref 11.5–15.4)
IMM GRANULOCYTES # BLD AUTO: 0.01 THOUSAND/UL (ref 0–0.2)
IMM GRANULOCYTES NFR BLD AUTO: 0 % (ref 0–2)
LYMPHOCYTES # BLD AUTO: 1.56 THOUSANDS/ΜL (ref 0.6–4.47)
LYMPHOCYTES NFR BLD AUTO: 30 % (ref 14–44)
MCH RBC QN AUTO: 20.5 PG (ref 26.8–34.3)
MCHC RBC AUTO-ENTMCNC: 28.8 G/DL (ref 31.4–37.4)
MCV RBC AUTO: 71 FL (ref 82–98)
MONOCYTES # BLD AUTO: 0.42 THOUSAND/ΜL (ref 0.17–1.22)
MONOCYTES NFR BLD AUTO: 8 % (ref 4–12)
NEUTROPHILS # BLD AUTO: 3.24 THOUSANDS/ΜL (ref 1.85–7.62)
NEUTS SEG NFR BLD AUTO: 61 % (ref 43–75)
NRBC BLD AUTO-RTO: 0 /100 WBCS
PLATELET # BLD AUTO: 441 THOUSANDS/UL (ref 149–390)
PMV BLD AUTO: 10.1 FL (ref 8.9–12.7)
RBC # BLD AUTO: 4.04 MILLION/UL (ref 3.81–5.12)
RHEUMATOID FACT SER QL LA: NEGATIVE
RYE IGE QN: NEGATIVE
UNIT DISPENSE STATUS: NORMAL
UNIT DISPENSE STATUS: NORMAL
UNIT PRODUCT CODE: NORMAL
UNIT PRODUCT CODE: NORMAL
UNIT RH: NORMAL
UNIT RH: NORMAL
WBC # BLD AUTO: 5.28 THOUSAND/UL (ref 4.31–10.16)

## 2018-10-05 PROCEDURE — 36415 COLL VENOUS BLD VENIPUNCTURE: CPT | Performed by: PHYSICIAN ASSISTANT

## 2018-10-05 PROCEDURE — 85018 HEMOGLOBIN: CPT | Performed by: PHYSICIAN ASSISTANT

## 2018-10-05 PROCEDURE — 85014 HEMATOCRIT: CPT | Performed by: PHYSICIAN ASSISTANT

## 2018-10-05 PROCEDURE — 96372 THER/PROPH/DIAG INJ SC/IM: CPT | Performed by: INTERNAL MEDICINE

## 2018-10-05 PROCEDURE — 99213 OFFICE O/P EST LOW 20 MIN: CPT | Performed by: INTERNAL MEDICINE

## 2018-10-05 PROCEDURE — 85025 COMPLETE CBC W/AUTO DIFF WBC: CPT | Performed by: INTERNAL MEDICINE

## 2018-10-05 RX ORDER — FERROUS SULFATE 325(65) MG
325 TABLET ORAL DAILY
Qty: 30 TABLET | Refills: 0 | Status: SHIPPED | OUTPATIENT
Start: 2018-10-05 | End: 2018-10-05

## 2018-10-05 RX ORDER — FERROUS SULFATE 325(65) MG
325 TABLET ORAL DAILY
Qty: 30 TABLET | Refills: 0 | Status: SHIPPED | OUTPATIENT
Start: 2018-10-05 | End: 2018-11-28 | Stop reason: SDUPTHER

## 2018-10-05 RX ORDER — CYANOCOBALAMIN 1000 UG/ML
1000 INJECTION INTRAMUSCULAR; SUBCUTANEOUS
Status: DISCONTINUED | OUTPATIENT
Start: 2018-10-05 | End: 2018-10-19

## 2018-10-05 RX ADMIN — CYANOCOBALAMIN 1000 MCG: 1000 INJECTION INTRAMUSCULAR; SUBCUTANEOUS at 13:51

## 2018-10-05 NOTE — TELEPHONE ENCOUNTER
Hemoglobin is better, now at 8 3  Would like to repeat this next Wednesday  Ordered, can go to the lab next week  We will hold off seeing the GI doctor for now  Please start taking iron supplements, ferrous sulfate 325 mg daily  This may cause constipation or black stools  Also start vitamin B12 1000 mcg daily      All the tests for arthritis are normal     If you feel more dizzy, short of breath etc  Need to go back to the ER

## 2018-10-05 NOTE — TELEPHONE ENCOUNTER
PT  WAS SEEN AT [facility] LAST NIGHT-HER HEMOGLOBIN WAS 6 2  SHE GOT 2 UNITS OF BLOOD AND THEN WENT UP 7 POINTS  THEY WOULD ONLY ALLOW HER TO GO HOME IF SHE CALLED HER PCP TODAY  OV TODAY?

## 2018-10-05 NOTE — ASSESSMENT & PLAN NOTE
She remains symptomatic, did not want to be admitted  Recheck CBC now, if Hgb lower, willing to be admitted to the hospital  Explained she may be actively bleeding  Other causes: iron and B12 deficient

## 2018-10-05 NOTE — DISCHARGE INSTRUCTIONS
Anemia   WHAT YOU NEED TO KNOW:   Anemia is a low number of red blood cells or a low amount of hemoglobin in your red blood cells  Hemoglobin is a protein that helps carry oxygen throughout your body  Red blood cells use iron to create hemoglobin  Anemia may develop if your body does not have enough iron  It may also develop if your body does not make enough red blood cells or they die faster than your body can make them  DISCHARGE INSTRUCTIONS:   Call 911 or have someone call 911 for any of the following:   · You lose consciousness  · You have severe chest pain  Return to the emergency department if:   · You have dark or bloody bowel movements  Contact your healthcare provider if:   · Your symptoms are worse, even after treatment  · You have questions or concerns about your condition or care  Medicines:   · Iron or folic acid supplements  help increase your red blood cell and hemoglobin levels  · Vitamin B12 injections  may help boost your red blood cell level and decrease your symptoms  Ask your healthcare provider how to inject B12  · Take your medicine as directed  Contact your healthcare provider if you think your medicine is not helping or if you have side effects  Tell him of her if you are allergic to any medicine  Keep a list of the medicines, vitamins, and herbs you take  Include the amounts, and when and why you take them  Bring the list or the pill bottles to follow-up visits  Carry your medicine list with you in case of an emergency  Prevent anemia:  Eat healthy foods rich in iron and vitamin C  Nuts, meat, dark leafy green vegetables, and beans are high in iron and protein  Vitamin C helps your body absorb iron  Foods rich in vitamin C include oranges and other citrus fruits  Ask your healthcare provider for a list of other foods that are high in iron or vitamin C  Ask if you need to be on a special diet     Follow up with your healthcare provider as directed:  Write down your questions so you remember to ask them during your visits  © 2017 [employer] Information is for End User's use only and may not be sold, redistributed or otherwise used for commercial purposes  All illustrations and images included in CareNotes® are the copyrighted property of [employer]  or Lemuel Kaur  The above information is an  only  It is not intended as medical advice for individual conditions or treatments  Talk to your doctor, nurse or pharmacist before following any medical regimen to see if it is safe and effective for you

## 2018-10-05 NOTE — PROGRESS NOTES
Assessment/Plan:    Anemia  She remains symptomatic, did not want to be admitted  Recheck CBC now, if Hgb lower, willing to be admitted to the hospital  Explained she may be actively bleeding  Other causes: iron and B12 deficient  Vitamin B12 deficiency  B12 injection given today  Diagnoses and all orders for this visit:    Anemia due to other cause, not classified  -     CBC and differential    Vitamin B12 deficiency  -     cyanocobalamin injection 1,000 mcg; Inject 1 mL (1,000 mcg total) into a muscle every 30 (thirty) days       Discussed symptoms to warrant ER evaluation  Repeat Hgb improved to 8 3  Instructed to start iron and B12 supplements  Recheck CBC next week  Subjective:      Patient ID: Rose Baum is a 57 y o  female  Mrs Vanna Goyal is here with her   She reports that vaginal bleeding has resolved over a week ago  She continues to feel tired and out of breath  She gets dizzy when she gets up  No GI symptoms  She is asking that she is experiencing more joint pains, especially her right knee  This started a few weeks ago only  She was at the ER last night, received 2 units of blood and hemoglobin only slightly improved  She was recommended to be admitted to the hospital but she declined  She is reluctant to stay overnight because of her narcolepsy  She is anxious to be alone  The following portions of the patient's history were reviewed and updated as appropriate: allergies, current medications, past medical history, past social history and problem list     Review of Systems   Constitutional: Positive for fatigue  Respiratory: Positive for shortness of breath  Gastrointestinal: Negative for anal bleeding, diarrhea and nausea  Genitourinary: Negative for vaginal bleeding  Musculoskeletal: Positive for arthralgias  Neurological: Positive for dizziness and light-headedness  Negative for headaches     Psychiatric/Behavioral: The patient is nervous/anxious  Objective:      /68   Pulse 88   Temp 98 °F (36 7 °C)   Resp 18   Ht 5' 5" (1 651 m)   Wt 97 9 kg (215 lb 12 8 oz)   LMP 09/23/2018   SpO2 98%   BMI 35 91 kg/m²          Physical Exam   Constitutional: She is oriented to person, place, and time  She appears well-developed and well-nourished  No distress  HENT:   Head: Normocephalic and atraumatic  Nose: Nose normal    Eyes: Pupils are equal, round, and reactive to light  Conjunctivae are normal    Neck: Neck supple  Cardiovascular: Normal rate, regular rhythm and normal heart sounds  No edema  Pulmonary/Chest: Effort normal and breath sounds normal  She has no wheezes  She has no rales  Abdominal: Soft  Bowel sounds are normal    Neurological: She is alert and oriented to person, place, and time  Skin: Skin is warm  She is not diaphoretic  There is pallor  Psychiatric: She has a normal mood and affect  Her behavior is normal    Nursing note and vitals reviewed

## 2018-10-07 LAB — VIT A SERPL-MCNC: 34.1 UG/DL (ref 33.1–100)

## 2018-10-10 ENCOUNTER — APPOINTMENT (OUTPATIENT)
Dept: LAB | Facility: CLINIC | Age: 48
End: 2018-10-10
Payer: COMMERCIAL

## 2018-10-10 DIAGNOSIS — D64.89 ANEMIA DUE TO OTHER CAUSE, NOT CLASSIFIED: ICD-10-CM

## 2018-10-10 LAB
BASOPHILS # BLD AUTO: 0.07 THOUSANDS/ΜL (ref 0–0.1)
BASOPHILS NFR BLD AUTO: 1 % (ref 0–1)
EOSINOPHIL # BLD AUTO: 0.02 THOUSAND/ΜL (ref 0–0.61)
EOSINOPHIL NFR BLD AUTO: 0 % (ref 0–6)
ERYTHROCYTE [DISTWIDTH] IN BLOOD BY AUTOMATED COUNT: 26.7 % (ref 11.6–15.1)
HCT VFR BLD AUTO: 34 % (ref 34.8–46.1)
HGB BLD-MCNC: 9.3 G/DL (ref 11.5–15.4)
IMM GRANULOCYTES # BLD AUTO: 0.01 THOUSAND/UL (ref 0–0.2)
IMM GRANULOCYTES NFR BLD AUTO: 0 % (ref 0–2)
LYMPHOCYTES # BLD AUTO: 2.04 THOUSANDS/ΜL (ref 0.6–4.47)
LYMPHOCYTES NFR BLD AUTO: 38 % (ref 14–44)
MCH RBC QN AUTO: 20.3 PG (ref 26.8–34.3)
MCHC RBC AUTO-ENTMCNC: 27.4 G/DL (ref 31.4–37.4)
MCV RBC AUTO: 74 FL (ref 82–98)
MONOCYTES # BLD AUTO: 0.73 THOUSAND/ΜL (ref 0.17–1.22)
MONOCYTES NFR BLD AUTO: 14 % (ref 4–12)
NEUTROPHILS # BLD AUTO: 2.53 THOUSANDS/ΜL (ref 1.85–7.62)
NEUTS SEG NFR BLD AUTO: 47 % (ref 43–75)
NRBC BLD AUTO-RTO: 0 /100 WBCS
PLATELET # BLD AUTO: 373 THOUSANDS/UL (ref 149–390)
PMV BLD AUTO: 10.9 FL (ref 8.9–12.7)
RBC # BLD AUTO: 4.58 MILLION/UL (ref 3.81–5.12)
WBC # BLD AUTO: 5.4 THOUSAND/UL (ref 4.31–10.16)

## 2018-10-10 PROCEDURE — 85025 COMPLETE CBC W/AUTO DIFF WBC: CPT

## 2018-10-10 PROCEDURE — 36415 COLL VENOUS BLD VENIPUNCTURE: CPT

## 2018-10-11 ENCOUNTER — TELEPHONE (OUTPATIENT)
Dept: INTERNAL MEDICINE CLINIC | Facility: CLINIC | Age: 48
End: 2018-10-11

## 2018-10-11 NOTE — TELEPHONE ENCOUNTER
----- Message from Kamjose Ortiz DO sent at 10/10/2018  7:11 PM EDT -----  BW is back and blood count has improved to 9 3, Dr Michelle Falcon is out of the office but pls continue with plan that she advised, thx

## 2018-10-12 ENCOUNTER — TELEPHONE (OUTPATIENT)
Dept: INTERNAL MEDICINE CLINIC | Facility: CLINIC | Age: 48
End: 2018-10-12

## 2018-10-12 ENCOUNTER — OFFICE VISIT (OUTPATIENT)
Dept: SLEEP CENTER | Facility: CLINIC | Age: 48
End: 2018-10-12
Payer: COMMERCIAL

## 2018-10-12 VITALS
SYSTOLIC BLOOD PRESSURE: 110 MMHG | HEART RATE: 80 BPM | WEIGHT: 212 LBS | HEIGHT: 65 IN | DIASTOLIC BLOOD PRESSURE: 84 MMHG | BODY MASS INDEX: 35.32 KG/M2

## 2018-10-12 DIAGNOSIS — G47.411 PRIMARY NARCOLEPSY WITH CATAPLEXY: Primary | ICD-10-CM

## 2018-10-12 DIAGNOSIS — D50.8 OTHER IRON DEFICIENCY ANEMIA: Primary | ICD-10-CM

## 2018-10-12 PROCEDURE — 99214 OFFICE O/P EST MOD 30 MIN: CPT | Performed by: INTERNAL MEDICINE

## 2018-10-12 RX ORDER — MIRTAZAPINE 15 MG/1
7.5 TABLET, FILM COATED ORAL 2 TIMES DAILY
Qty: 30 TABLET | Refills: 2 | Status: SHIPPED | OUTPATIENT
Start: 2018-10-12 | End: 2019-01-02 | Stop reason: SDUPTHER

## 2018-10-12 NOTE — TELEPHONE ENCOUNTER
PT WANTS TO KNOW WHAT TO DO FOR HER ANEMIA  HER HGB WAS 9 3 ON 10/10  Dorys LAMB IS OUT OF THE OFFICE UNTIL 10/18

## 2018-10-12 NOTE — PROGRESS NOTES
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    Assessment:  The patient had a severe drop in hemoglobin to 6 2, for unknown cause  She is undergoing evaluation and treatment by Dr Rickie Valladares  She has had cataplexy more frequently  She is currently on venlafaxine for mood disorder  I will start mirtazapine to help control her cataplexy  It is unlikely she will develop serotonin syndrome at the very low dose, which I have prescribed  Her symptoms overlap between syncope from anemia and cataplexy  Plan:  Mirtazapine 7 5 mg p o  b i d  Follow up:  Four weeks    History of Present Illness: The patient has experienced a feeling of buzzing or shooting through her head and body  The symptoms are similar to tingling  She experienced a significant drop in her hemoglobin for unknown cause  She reports more frequent cataplexy  In one case she fell in the middle of the street and could not get up        Review of Systems      Genitourinary excessive blood loss during menses and hot flashes at night   Cardiology Frequent chest pain or angina,    Gastrointestinal frequent heartburn/acid reflux   Neurology frequent headaches, loss of consciousness, forgetfulness, poor concentration or confusion, , difficulty with memory, loss of consciousness/blacking out and balance problems   Constitutional claustrophobia and excessive sweating at night   Integumentary none   Psychiatry anxiety and depression   Musculoskeletal joint pain and muscle aches   Pulmonary chest tightness   ENT none   Endocrine none   Hematological abnormal blood loss and blood donor         Historical Information    Past Medical History:   Past Medical History:   Diagnosis Date    Anxiety     Asthma     childhood    Depression     Narcolepsy          Past Surgical History:   Past Surgical History:   Procedure Laterality Date     SECTION  CHOLECYSTECTOMY      KNEE ARTHROSCOPY Left          Social History - see chart  History   Alcohol use: Not on file     History   Drug Use Not on file     History   Smoking Status    Not on file   Smokeless Tobacco    Not on file     Family History:   Family History   Problem Relation Age of Onset    Hypertension Mother     Depression Mother     Atrial fibrillation Mother     Hypertension Father     Depression Father     Heart failure Father     Diabetes Maternal Grandmother     Stroke Maternal Grandmother     Lung cancer Paternal Grandfather     Brain cancer Maternal Grandfather     Alcohol abuse Neg Hx     Substance Abuse Neg Hx     Mental illness Neg Hx        Medications/Allergies:      Current Outpatient Prescriptions:     albuterol (VENTOLIN HFA) 90 mcg/act inhaler, Inhale 1 puff every 6 (six) hours as needed for wheezing, Disp: 1 Inhaler, Rfl: 0    buPROPion (WELLBUTRIN) 100 mg tablet, Take 100 mg by mouth 3 (three) times a day, Disp: , Rfl:     clonazePAM (KlonoPIN) 0 5 mg tablet, Take 0 5 mg by mouth 4 (four) times a day, Disp: , Rfl:     ferrous sulfate 325 (65 Fe) mg tablet, Take 1 tablet (325 mg total) by mouth daily, Disp: 30 tablet, Rfl: 0    gabapentin (NEURONTIN) 300 mg capsule, Take 100 mg by mouth 4 (four) times a day, Disp: , Rfl:     Sodium Oxybate 500 MG/ML SOLN, Take by mouth 2 (two) times a day, Disp: , Rfl:     venlafaxine (EFFEXOR) 100 MG tablet, Take 100 mg by mouth 3 (three) times a day, Disp: , Rfl:     venlafaxine (EFFEXOR) 75 mg tablet, Take 75 mg by mouth 3 (three) times a day, Disp: , Rfl:     mirtazapine (REMERON) 15 mg tablet, Take 0 5 tablets (7 5 mg total) by mouth 2 (two) times a day for 30 days, Disp: 30 tablet, Rfl: 2    naproxen (NAPROSYN) 500 mg tablet, Take 1 tablet (500 mg total) by mouth 2 (two) times a day with meals for 3 days, Disp: 6 tablet, Rfl: 0    Current Facility-Administered Medications:     cyanocobalamin injection 1,000 mcg, 1,000 mcg, Intramuscular, Q30 Days, Antonieta Hermosillo MD, 1,000 mcg at 10/05/18 1351      Objective    Vital Signs:   Vitals:    10/12/18 1100   BP: 110/84   Pulse: 80   Weight: 96 2 kg (212 lb)   Height: 5' 5" (1 651 m)     [place] Sleepiness Scale: Total score: 0    Physical Exam:    General: Alert, appropriate, cooperative, overweight    Head: NC/AT    Skin: Warm, dry    Neuro: No motor abnormalities, cranial nerves appear intact    Psych: Normal affect      Counseling / Coordination of Care  Total clinic time spent today 15 minutes  Greater than 50% of total time was spent with the patient and / or family counseling and / or coordination of care  A description of the counseling / coordination of care: treatment was discussed    PARUL Willingham    Board Certified Sleep Specialist

## 2018-10-15 LAB
EST. AVERAGE GLUCOSE BLD GHB EST-MCNC: 117 MG/DL
HBA1C MFR BLD: 5.7 % (ref 4.2–6.3)

## 2018-10-16 ENCOUNTER — ANNUAL EXAM (OUTPATIENT)
Dept: OBGYN CLINIC | Facility: CLINIC | Age: 48
End: 2018-10-16
Payer: COMMERCIAL

## 2018-10-16 VITALS
DIASTOLIC BLOOD PRESSURE: 68 MMHG | HEIGHT: 65 IN | SYSTOLIC BLOOD PRESSURE: 112 MMHG | WEIGHT: 210 LBS | BODY MASS INDEX: 34.99 KG/M2

## 2018-10-16 DIAGNOSIS — Z01.419 ENCOUNTER FOR GYNECOLOGICAL EXAMINATION: ICD-10-CM

## 2018-10-16 DIAGNOSIS — Z01.419 PAP SMEAR, AS PART OF ROUTINE GYNECOLOGICAL EXAMINATION: ICD-10-CM

## 2018-10-16 DIAGNOSIS — N95.0 PMB (POSTMENOPAUSAL BLEEDING): ICD-10-CM

## 2018-10-16 DIAGNOSIS — Z12.39 BREAST CANCER SCREENING: Primary | ICD-10-CM

## 2018-10-16 PROCEDURE — G0145 SCR C/V CYTO,THINLAYER,RESCR: HCPCS | Performed by: OBSTETRICS & GYNECOLOGY

## 2018-10-16 PROCEDURE — S0612 ANNUAL GYNECOLOGICAL EXAMINA: HCPCS | Performed by: OBSTETRICS & GYNECOLOGY

## 2018-10-16 NOTE — PROGRESS NOTES
Assessment/Plan:    No problem-specific Assessment & Plan notes found for this encounter  Normal gynecological physical examination  Self-breast examination stressed  Mammogram ordered  Discussed regular exercise, healthy diet, importance of vitamin D and calcium supplements  Discussed importance of sun block use during periods of prolonged sun exposure  Patient will be seen in 1 year for routine gynecologic and medical examination  Patient will call office for any problems, concerns, or issues which may arise during the interim  Diagnoses and all orders for this visit:    Breast cancer screening  -     Mammo screening bilateral w cad; Future    Encounter for gynecological examination    Pap smear, as part of routine gynecological examination  -     Liquid-based pap, screening    PMB (postmenopausal bleeding)  -     US pelvis transabdominal only; Future        Subjective:      Patient ID: Carlos Chery is a 57 y o  female  Patient is a 57year old female who presents to the office for an annual gynecologic and medical examination  Patient complains of increasingly irregular periods with her last period about 3-4 weeks ago during which she bleed heavily for about 1 5 weeks  After this episode, her primary care physician encouraged her to go to the emergency room to manage an acute episode of anemia  Patient denies any vaginal discharge, pelvic pain, abdominal pain/symptoms  Patient reports normal bladder and bowel function  Patient denies any breast masses, changes, pain or discharge and performs regular self-breast examinations  Mammography ordered for patient today  Patient is being followed and treated for narcolepsy, anxiety, iron deficiency anemia, and vitamin B12 deficiency           The following portions of the patient's history were reviewed and updated as appropriate: allergies, current medications, past family history, past medical history, past social history, past surgical history and problem list     Review of Systems   Constitutional: Negative  HENT: Negative  Eyes: Negative  Respiratory: Negative  Cardiovascular: Negative  Gastrointestinal: Negative  Endocrine: Negative  Genitourinary: Negative  Musculoskeletal: Positive for arthralgias  Skin: Negative  Neurological: Negative  Hematological:        Followed for iron deficiency anemia and vitamin B12 deficiency  Psychiatric/Behavioral: Negative  Followed for anxiety and narcolepsy         Objective:      /68 (BP Location: Left arm, Patient Position: Sitting, Cuff Size: Large)   Ht 5' 5" (1 651 m)   Wt 95 3 kg (210 lb)   LMP 09/23/2018   BMI 34 95 kg/m²          Physical Exam   Constitutional: She appears well-developed and well-nourished  HENT:   Head: Normocephalic  Eyes: Pupils are equal, round, and reactive to light  Neck: Normal range of motion  Neck supple  Cardiovascular: Normal rate and regular rhythm  Pulmonary/Chest: Effort normal and breath sounds normal  Right breast exhibits no inverted nipple, no mass, no nipple discharge, no skin change and no tenderness  Left breast exhibits no inverted nipple, no mass, no nipple discharge, no skin change and no tenderness  Breasts are symmetrical    Abdominal: Soft  Normal appearance and bowel sounds are normal    Genitourinary: Rectum normal, vagina normal and uterus normal  Rectal exam shows guaiac negative stool  Pelvic exam was performed with patient supine  Right adnexum displays no mass, no tenderness and no fullness  Left adnexum displays no mass, no tenderness and no fullness  No vaginal discharge found  Lymphadenopathy:     She has no cervical adenopathy  She has no axillary adenopathy  Right: No inguinal and no supraclavicular adenopathy present  Left: No inguinal and no supraclavicular adenopathy present  Neurological: She is alert  Skin: Skin is intact  No rash noted  Psychiatric: Her speech is normal and behavior is normal  Judgment and thought content normal  Her mood appears anxious   Cognition and memory are normal

## 2018-10-16 NOTE — PATIENT INSTRUCTIONS
Normal gynecological physical examination  Self-breast examination stressed  Mammogram ordered  Discussed regular exercise, healthy diet, importance of vitamin D and calcium supplements  Discussed importance of sun block use during periods of prolonged sun exposure  Patient will be seen in 1 year for routine gynecologic and medical examination  Patient will call office for any problems, concerns, or issues which may arise during the interim      Will check baseline ultrasound in light of the perimenopausal bleeding pattern

## 2018-10-18 ENCOUNTER — APPOINTMENT (OUTPATIENT)
Dept: LAB | Facility: CLINIC | Age: 48
End: 2018-10-18
Payer: COMMERCIAL

## 2018-10-18 ENCOUNTER — TELEPHONE (OUTPATIENT)
Dept: INTERNAL MEDICINE CLINIC | Facility: CLINIC | Age: 48
End: 2018-10-18

## 2018-10-18 LAB
BASOPHILS # BLD AUTO: 0.04 THOUSANDS/ΜL (ref 0–0.1)
BASOPHILS NFR BLD AUTO: 1 % (ref 0–1)
EOSINOPHIL # BLD AUTO: 0.01 THOUSAND/ΜL (ref 0–0.61)
EOSINOPHIL NFR BLD AUTO: 0 % (ref 0–6)
ERYTHROCYTE [DISTWIDTH] IN BLOOD BY AUTOMATED COUNT: 28.6 % (ref 11.6–15.1)
HCT VFR BLD AUTO: 33.6 % (ref 34.8–46.1)
HGB BLD-MCNC: 9.4 G/DL (ref 11.5–15.4)
IMM GRANULOCYTES # BLD AUTO: 0 THOUSAND/UL (ref 0–0.2)
IMM GRANULOCYTES NFR BLD AUTO: 0 % (ref 0–2)
LYMPHOCYTES # BLD AUTO: 1.67 THOUSANDS/ΜL (ref 0.6–4.47)
LYMPHOCYTES NFR BLD AUTO: 35 % (ref 14–44)
MCH RBC QN AUTO: 21.3 PG (ref 26.8–34.3)
MCHC RBC AUTO-ENTMCNC: 28 G/DL (ref 31.4–37.4)
MCV RBC AUTO: 76 FL (ref 82–98)
MONOCYTES # BLD AUTO: 0.48 THOUSAND/ΜL (ref 0.17–1.22)
MONOCYTES NFR BLD AUTO: 10 % (ref 4–12)
NEUTROPHILS # BLD AUTO: 2.55 THOUSANDS/ΜL (ref 1.85–7.62)
NEUTS SEG NFR BLD AUTO: 54 % (ref 43–75)
NRBC BLD AUTO-RTO: 0 /100 WBCS
PLATELET # BLD AUTO: 275 THOUSANDS/UL (ref 149–390)
RBC # BLD AUTO: 4.42 MILLION/UL (ref 3.81–5.12)
WBC # BLD AUTO: 4.75 THOUSAND/UL (ref 4.31–10.16)

## 2018-10-18 PROCEDURE — 85025 COMPLETE CBC W/AUTO DIFF WBC: CPT

## 2018-10-18 PROCEDURE — 36415 COLL VENOUS BLD VENIPUNCTURE: CPT

## 2018-10-18 NOTE — TELEPHONE ENCOUNTER
Spoke w/ pt  And adv'd  She does take b12 supplements  I sched'd B12 inj  For 11/5  Pt  Has an appt  W/ you tomorrow  Do you still want to see her? Also she did see gyn Doc  On Tuesday

## 2018-10-18 NOTE — TELEPHONE ENCOUNTER
Recheck CBC today  Are you taking iron and B12 supplements?     Schedule B12 injection on or after 11/5

## 2018-10-18 NOTE — TELEPHONE ENCOUNTER
Pt had BW the morning of  10/18/18 and this afternoon she called asking for the results specifically her Hemoglobin  She has an appointment on 10/19/18

## 2018-10-18 NOTE — TELEPHONE ENCOUNTER
Yes, I would still like to see her tomorrow if that is okay with her    Please do blood work before visit

## 2018-10-19 ENCOUNTER — TELEPHONE (OUTPATIENT)
Dept: SLEEP CENTER | Facility: CLINIC | Age: 48
End: 2018-10-19

## 2018-10-19 ENCOUNTER — OFFICE VISIT (OUTPATIENT)
Dept: INTERNAL MEDICINE CLINIC | Facility: CLINIC | Age: 48
End: 2018-10-19
Payer: COMMERCIAL

## 2018-10-19 VITALS
HEIGHT: 65 IN | WEIGHT: 211.4 LBS | SYSTOLIC BLOOD PRESSURE: 128 MMHG | TEMPERATURE: 98.6 F | BODY MASS INDEX: 35.22 KG/M2 | RESPIRATION RATE: 18 BRPM | HEART RATE: 82 BPM | DIASTOLIC BLOOD PRESSURE: 82 MMHG | OXYGEN SATURATION: 96 %

## 2018-10-19 DIAGNOSIS — M25.50 ARTHRALGIA, UNSPECIFIED JOINT: ICD-10-CM

## 2018-10-19 DIAGNOSIS — Z98.84 S/P GASTRIC BYPASS: ICD-10-CM

## 2018-10-19 DIAGNOSIS — D64.89 ANEMIA DUE TO OTHER CAUSE, NOT CLASSIFIED: Primary | ICD-10-CM

## 2018-10-19 DIAGNOSIS — E53.8 VITAMIN B12 DEFICIENCY: ICD-10-CM

## 2018-10-19 DIAGNOSIS — D50.8 OTHER IRON DEFICIENCY ANEMIA: ICD-10-CM

## 2018-10-19 DIAGNOSIS — K64.4 EXTERNAL HEMORRHOID: ICD-10-CM

## 2018-10-19 PROCEDURE — 1036F TOBACCO NON-USER: CPT | Performed by: INTERNAL MEDICINE

## 2018-10-19 PROCEDURE — 3008F BODY MASS INDEX DOCD: CPT | Performed by: INTERNAL MEDICINE

## 2018-10-19 PROCEDURE — 99214 OFFICE O/P EST MOD 30 MIN: CPT | Performed by: INTERNAL MEDICINE

## 2018-10-19 RX ORDER — CYANOCOBALAMIN 1000 UG/ML
1000 INJECTION INTRAMUSCULAR; SUBCUTANEOUS
Qty: 10 ML | Refills: 0 | Status: SHIPPED | OUTPATIENT
Start: 2018-10-19 | End: 2019-02-04 | Stop reason: SDUPTHER

## 2018-10-19 NOTE — TELEPHONE ENCOUNTER
Pt's  called, states pt just saw Dr Tessa Moreno and advised him that she needs a refill on her Xyrem  He just spoke with the pharmacist from [facility] program and he advised that they have been trying to contact our office and not getting a response  I did not see anything in the patients chart  I advised to have pharmacist fax new request to [phone] or he can call me at [phone]  Will await fax

## 2018-10-19 NOTE — PATIENT INSTRUCTIONS
Start taking fiber supplement to help with constipation  You may take a stool softener (docusate sodium twice a day or Senna once a day) as needed

## 2018-10-19 NOTE — ASSESSMENT & PLAN NOTE
Add fiber in diet, may continue using Preparation H as needed  Instructed to use stool softener as needed

## 2018-10-19 NOTE — ASSESSMENT & PLAN NOTE
Probably due to iron and B12 deficiency due to poor absorption  Hgb stable  Recommend to see GI for possible colonoscopy

## 2018-10-19 NOTE — PROGRESS NOTES
Assessment/Plan:    Iron deficiency anemia  Unable to tolerated iron bid, continue daily dosing  Agrees to see hematology  Vitamin B12 deficiency  Continue monthly B12 injections, also on daily oral supplements  Anemia  Probably due to iron and B12 deficiency due to poor absorption  Hgb stable  Recommend to see GI for possible colonoscopy  Arthralgia  Symptoms slightly improved  Pain mostly in both shoulders (tendonitis), arms and knees  Migraine with aura and without status migrainosus, not intractable  No recent episodes  Head tingling sensation vs migraine HA  External hemorrhoid  Add fiber in diet, may continue using Preparation H as needed  Instructed to use stool softener as needed  Primary narcolepsy with cataplexy  Stable  Menometrorrhagia  Resolved  Recently saw gynecology  Diagnoses and all orders for this visit:    Anemia due to other cause, not classified  -     Ambulatory referral to Hematology / Oncology; Future  -     Ambulatory referral to Gastroenterology; Future    Other iron deficiency anemia  -     Ambulatory referral to Hematology / Oncology; Future  -     CBC and differential; Future  -     Ferritin; Future  -     Iron; Future    Vitamin B12 deficiency  -     Ambulatory referral to Hematology / Oncology; Future  -     cyanocobalamin 1,000 mcg/mL; Inject 1 mL (1,000 mcg total) into a muscle every 30 (thirty) days  -     SYRINGE-NEEDLE, DISP, 3 ML 25G X 1" 3 ML MISC; by Does not apply route every 30 (thirty) days    S/P gastric bypass  -     Ambulatory referral to Gastroenterology; Future  -     Comprehensive metabolic panel; Future    Arthralgia, unspecified joint  -     Comprehensive metabolic panel; Future    External hemorrhoid      Follow up as scheduled  Subjective:      Patient ID: Cheng Brown is a 57 y o  female  Mrs Marilee Damon has been feeling better    She reports that she experiences tingling around her head, occurs at least once a day for the past 6 to 7 months  During the past few days, it has occurred all day  No headaches, blurring of vision, extremity weakness  She describes the sensation "like a sparkler," mild but present  She reports feeling less tired, less short of breath  She has always attributed her symptoms to her narcolepsy  She tried increasing the iron pill to twice a day but felt sick  She reports she developed hemorrhoids since starting the iron supplements, currently using Preparation H with good relief  Denies any bleeding  The following portions of the patient's history were reviewed and updated as appropriate: allergies, current medications, past medical history, past social history, past surgical history and problem list     Review of Systems   Constitutional: Positive for fatigue  Respiratory: Negative for cough and shortness of breath  Cardiovascular: Negative for chest pain  Gastrointestinal: Positive for constipation  Negative for abdominal pain and nausea  Musculoskeletal: Positive for arthralgias  Negative for neck pain and neck stiffness  Neurological: Positive for headaches  Negative for dizziness, weakness and light-headedness  Psychiatric/Behavioral: Positive for sleep disturbance  Objective:      /82   Pulse 82   Temp 98 6 °F (37 °C)   Resp 18   Ht 5' 5" (1 651 m)   Wt 95 9 kg (211 lb 6 4 oz)   LMP 09/23/2018   SpO2 96%   BMI 35 18 kg/m²          Physical Exam   Constitutional: She is oriented to person, place, and time  She appears well-developed and well-nourished  HENT:   Head: Normocephalic and atraumatic  Nose: Nose normal    Eyes: Pupils are equal, round, and reactive to light  Conjunctivae are normal    Neck: Neck supple  Cardiovascular: Normal rate, regular rhythm and normal heart sounds  No edema  Pulmonary/Chest: Effort normal and breath sounds normal  She has no wheezes  She has no rales  Abdominal: Soft   Bowel sounds are normal    Neurological: She is alert and oriented to person, place, and time  Skin: Skin is warm  No rash noted  Psychiatric: She has a normal mood and affect  Her behavior is normal    Nursing note and vitals reviewed  Lab results reviewed with patient

## 2018-10-22 NOTE — TELEPHONE ENCOUNTER
No dose documented in chart, last documented dose in paper chart from 4/17/18, 4 5 G first dose, 4 5 G second dose, total 9 G nightly dose  Can you please reorder  Thank you!

## 2018-10-23 DIAGNOSIS — G47.411 NARCOLEPSY AND CATAPLEXY: Primary | ICD-10-CM

## 2018-10-23 DIAGNOSIS — G47.429 NARCOLEPSY DUE TO UNDERLYING CONDITION WITHOUT CATAPLEXY: Primary | ICD-10-CM

## 2018-10-23 LAB
LAB AP GYN PRIMARY INTERPRETATION: NORMAL
Lab: NORMAL
PATH INTERP SPEC-IMP: NORMAL

## 2018-10-23 NOTE — TELEPHONE ENCOUNTER
Sujit Shows 10/22/18 at [phone], asking to have Xyrem sent to the pharmacy, states he called them today and they have not received the script  He states that Ivan Galindolashonda will not get her medication next week if they do not get the script today  I attempted to call Rodrigo More that I will have Dr Albert Olivo send script today  Advised to call with any other questions or concerns

## 2018-11-13 ENCOUNTER — APPOINTMENT (OUTPATIENT)
Dept: LAB | Facility: CLINIC | Age: 48
End: 2018-11-13
Payer: COMMERCIAL

## 2018-11-13 ENCOUNTER — TELEPHONE (OUTPATIENT)
Dept: INTERNAL MEDICINE CLINIC | Facility: CLINIC | Age: 48
End: 2018-11-13

## 2018-11-13 DIAGNOSIS — D50.8 OTHER IRON DEFICIENCY ANEMIA: Primary | ICD-10-CM

## 2018-11-13 LAB
BASOPHILS # BLD AUTO: 0.04 THOUSANDS/ΜL (ref 0–0.1)
BASOPHILS NFR BLD AUTO: 1 % (ref 0–1)
EOSINOPHIL # BLD AUTO: 0.06 THOUSAND/ΜL (ref 0–0.61)
EOSINOPHIL NFR BLD AUTO: 1 % (ref 0–6)
ERYTHROCYTE [DISTWIDTH] IN BLOOD BY AUTOMATED COUNT: 28.1 % (ref 11.6–15.1)
HCT VFR BLD AUTO: 37.9 % (ref 34.8–46.1)
HGB BLD-MCNC: 10.8 G/DL (ref 11.5–15.4)
IMM GRANULOCYTES # BLD AUTO: 0.03 THOUSAND/UL (ref 0–0.2)
IMM GRANULOCYTES NFR BLD AUTO: 1 % (ref 0–2)
LYMPHOCYTES # BLD AUTO: 1.21 THOUSANDS/ΜL (ref 0.6–4.47)
LYMPHOCYTES NFR BLD AUTO: 21 % (ref 14–44)
MCH RBC QN AUTO: 23.7 PG (ref 26.8–34.3)
MCHC RBC AUTO-ENTMCNC: 28.5 G/DL (ref 31.4–37.4)
MCV RBC AUTO: 83 FL (ref 82–98)
MONOCYTES # BLD AUTO: 0.47 THOUSAND/ΜL (ref 0.17–1.22)
MONOCYTES NFR BLD AUTO: 8 % (ref 4–12)
NEUTROPHILS # BLD AUTO: 3.95 THOUSANDS/ΜL (ref 1.85–7.62)
NEUTS SEG NFR BLD AUTO: 68 % (ref 43–75)
NRBC BLD AUTO-RTO: 0 /100 WBCS
PLATELET # BLD AUTO: 287 THOUSANDS/UL (ref 149–390)
RBC # BLD AUTO: 4.56 MILLION/UL (ref 3.81–5.12)
WBC # BLD AUTO: 5.76 THOUSAND/UL (ref 4.31–10.16)

## 2018-11-13 PROCEDURE — 85025 COMPLETE CBC W/AUTO DIFF WBC: CPT | Performed by: INTERNAL MEDICINE

## 2018-11-13 PROCEDURE — 36415 COLL VENOUS BLD VENIPUNCTURE: CPT | Performed by: INTERNAL MEDICINE

## 2018-11-13 NOTE — TELEPHONE ENCOUNTER
When you hit your head, did you pass out? Any headache or dizziness, nausea or vomiting? Check CBC, ordered  Assessment  DMT2: 67y Male with DM T2 with hyperglycemia  blood sugars running high, eating meals,  compliant with low carb diet.  HTN: On meds, controlled  HLD:  on statin, tolerating.  CAD: On treatment, planing surgery.

## 2018-11-13 NOTE — TELEPHONE ENCOUNTER
patient has been treated for anemia  Left leg has more predominant brusing in her inner leg  But also on on her right leg  Some have faded and others appear  Its been a week since this has started  No other symptoms  She did fall on Saturday she did hit her head not sure if its her narcolepsy or if her blood was low

## 2018-11-13 NOTE — TELEPHONE ENCOUNTER
Patient states she honestly does not remember hitting her head  States she possibly could have passed out but she does not remember  States no headaches, no dizziness, no nausea or vomiting  States she will go to lab and get CBC done

## 2018-11-14 ENCOUNTER — TELEPHONE (OUTPATIENT)
Dept: INTERNAL MEDICINE CLINIC | Facility: CLINIC | Age: 48
End: 2018-11-14

## 2018-11-14 NOTE — TELEPHONE ENCOUNTER
Platelets are ok  Are you taking aspirin, fish oil capsules or other over the counter medication? These may cause easy bruising

## 2018-11-15 NOTE — TELEPHONE ENCOUNTER
Patient notified  States she is concerned about stopping the glucosamine because she has major joint pain  Would like to know what PCP recommends for the joint pain instead

## 2018-11-15 NOTE — TELEPHONE ENCOUNTER
If bruising is not bothersome, can continue glucosamine  If it is, stop glucosamine and try turmeric

## 2018-11-28 ENCOUNTER — TELEPHONE (OUTPATIENT)
Dept: INTERNAL MEDICINE CLINIC | Facility: CLINIC | Age: 48
End: 2018-11-28

## 2018-11-28 DIAGNOSIS — D64.89 ANEMIA DUE TO OTHER CAUSE, NOT CLASSIFIED: ICD-10-CM

## 2018-11-28 RX ORDER — FERROUS SULFATE 325(65) MG
325 TABLET ORAL DAILY
Qty: 90 TABLET | Refills: 1 | Status: SHIPPED | OUTPATIENT
Start: 2018-11-28 | End: 2019-06-07 | Stop reason: SDUPTHER

## 2018-11-28 NOTE — TELEPHONE ENCOUNTER
Regarding: Prescription Question  ----- Message from Leslie Bradley sent at 11/28/2018  7:37 AM EST -----       ----- Message from Alfred Will to Rossy De La Fuente MD sent at 11/28/2018  2:19 AM -----   I was given a script in the ER for 325mg of Iron  I am out of them could that be called into my pharmacy?   Thank you!!!

## 2018-12-03 ENCOUNTER — TELEPHONE (OUTPATIENT)
Dept: INTERNAL MEDICINE CLINIC | Facility: CLINIC | Age: 48
End: 2018-12-03

## 2018-12-03 NOTE — TELEPHONE ENCOUNTER
Please remind to do blood work today or this week  Will be check ferritin/iron levels  Not fasting    Rescheduled for next month

## 2018-12-07 ENCOUNTER — TELEPHONE (OUTPATIENT)
Dept: INTERNAL MEDICINE CLINIC | Facility: CLINIC | Age: 48
End: 2018-12-07

## 2018-12-07 ENCOUNTER — APPOINTMENT (OUTPATIENT)
Dept: LAB | Facility: CLINIC | Age: 48
End: 2018-12-07
Payer: COMMERCIAL

## 2018-12-07 DIAGNOSIS — M25.50 ARTHRALGIA, UNSPECIFIED JOINT: ICD-10-CM

## 2018-12-07 DIAGNOSIS — Z98.84 S/P GASTRIC BYPASS: ICD-10-CM

## 2018-12-07 DIAGNOSIS — D50.8 OTHER IRON DEFICIENCY ANEMIA: ICD-10-CM

## 2018-12-07 LAB
ALBUMIN SERPL BCP-MCNC: 3.4 G/DL (ref 3.5–5)
ALP SERPL-CCNC: 155 U/L (ref 46–116)
ALT SERPL W P-5'-P-CCNC: 39 U/L (ref 12–78)
ANION GAP SERPL CALCULATED.3IONS-SCNC: 7 MMOL/L (ref 4–13)
AST SERPL W P-5'-P-CCNC: 77 U/L (ref 5–45)
BASOPHILS # BLD AUTO: 0.04 THOUSANDS/ΜL (ref 0–0.1)
BASOPHILS NFR BLD AUTO: 1 % (ref 0–1)
BILIRUB SERPL-MCNC: 0.2 MG/DL (ref 0.2–1)
BUN SERPL-MCNC: 9 MG/DL (ref 5–25)
CALCIUM SERPL-MCNC: 9 MG/DL (ref 8.3–10.1)
CHLORIDE SERPL-SCNC: 106 MMOL/L (ref 100–108)
CO2 SERPL-SCNC: 28 MMOL/L (ref 21–32)
CREAT SERPL-MCNC: 0.76 MG/DL (ref 0.6–1.3)
EOSINOPHIL # BLD AUTO: 0.08 THOUSAND/ΜL (ref 0–0.61)
EOSINOPHIL NFR BLD AUTO: 1 % (ref 0–6)
ERYTHROCYTE [DISTWIDTH] IN BLOOD BY AUTOMATED COUNT: 22.3 % (ref 11.6–15.1)
FERRITIN SERPL-MCNC: 25 NG/ML (ref 8–388)
GFR SERPL CREATININE-BSD FRML MDRD: 94 ML/MIN/1.73SQ M
GLUCOSE P FAST SERPL-MCNC: 68 MG/DL (ref 65–99)
HCT VFR BLD AUTO: 41.9 % (ref 34.8–46.1)
HGB BLD-MCNC: 12.8 G/DL (ref 11.5–15.4)
IMM GRANULOCYTES # BLD AUTO: 0.01 THOUSAND/UL (ref 0–0.2)
IMM GRANULOCYTES NFR BLD AUTO: 0 % (ref 0–2)
IRON SERPL-MCNC: 124 UG/DL (ref 50–170)
LYMPHOCYTES # BLD AUTO: 2.35 THOUSANDS/ΜL (ref 0.6–4.47)
LYMPHOCYTES NFR BLD AUTO: 37 % (ref 14–44)
MCH RBC QN AUTO: 25.8 PG (ref 26.8–34.3)
MCHC RBC AUTO-ENTMCNC: 30.5 G/DL (ref 31.4–37.4)
MCV RBC AUTO: 85 FL (ref 82–98)
MONOCYTES # BLD AUTO: 0.63 THOUSAND/ΜL (ref 0.17–1.22)
MONOCYTES NFR BLD AUTO: 10 % (ref 4–12)
NEUTROPHILS # BLD AUTO: 3.29 THOUSANDS/ΜL (ref 1.85–7.62)
NEUTS SEG NFR BLD AUTO: 51 % (ref 43–75)
NRBC BLD AUTO-RTO: 0 /100 WBCS
PLATELET # BLD AUTO: 323 THOUSANDS/UL (ref 149–390)
PMV BLD AUTO: 12.3 FL (ref 8.9–12.7)
POTASSIUM SERPL-SCNC: 3.9 MMOL/L (ref 3.5–5.3)
PROT SERPL-MCNC: 7.9 G/DL (ref 6.4–8.2)
RBC # BLD AUTO: 4.96 MILLION/UL (ref 3.81–5.12)
SODIUM SERPL-SCNC: 141 MMOL/L (ref 136–145)
WBC # BLD AUTO: 6.4 THOUSAND/UL (ref 4.31–10.16)

## 2018-12-07 PROCEDURE — 85025 COMPLETE CBC W/AUTO DIFF WBC: CPT

## 2018-12-07 PROCEDURE — 82728 ASSAY OF FERRITIN: CPT

## 2018-12-07 PROCEDURE — 36415 COLL VENOUS BLD VENIPUNCTURE: CPT

## 2018-12-07 PROCEDURE — 80053 COMPREHEN METABOLIC PANEL: CPT

## 2018-12-07 PROCEDURE — 83540 ASSAY OF IRON: CPT

## 2019-01-02 ENCOUNTER — OFFICE VISIT (OUTPATIENT)
Dept: INTERNAL MEDICINE CLINIC | Facility: CLINIC | Age: 49
End: 2019-01-02
Payer: COMMERCIAL

## 2019-01-02 VITALS
RESPIRATION RATE: 18 BRPM | HEART RATE: 93 BPM | OXYGEN SATURATION: 99 % | WEIGHT: 190 LBS | HEIGHT: 65 IN | SYSTOLIC BLOOD PRESSURE: 114 MMHG | TEMPERATURE: 98.6 F | DIASTOLIC BLOOD PRESSURE: 82 MMHG | BODY MASS INDEX: 31.65 KG/M2

## 2019-01-02 DIAGNOSIS — G47.411 PRIMARY NARCOLEPSY WITH CATAPLEXY: ICD-10-CM

## 2019-01-02 DIAGNOSIS — M25.50 ARTHRALGIA, UNSPECIFIED JOINT: Primary | ICD-10-CM

## 2019-01-02 DIAGNOSIS — D50.8 OTHER IRON DEFICIENCY ANEMIA: ICD-10-CM

## 2019-01-02 DIAGNOSIS — E66.09 CLASS 2 OBESITY DUE TO EXCESS CALORIES WITHOUT SERIOUS COMORBIDITY WITH BODY MASS INDEX (BMI) OF 35.0 TO 35.9 IN ADULT: ICD-10-CM

## 2019-01-02 DIAGNOSIS — E55.9 VITAMIN D DEFICIENCY: ICD-10-CM

## 2019-01-02 DIAGNOSIS — Z98.84 S/P GASTRIC BYPASS: ICD-10-CM

## 2019-01-02 DIAGNOSIS — Z71.9 HEALTH COUNSELING: ICD-10-CM

## 2019-01-02 DIAGNOSIS — R73.03 PREDIABETES: ICD-10-CM

## 2019-01-02 DIAGNOSIS — E53.8 VITAMIN B12 DEFICIENCY: ICD-10-CM

## 2019-01-02 PROCEDURE — 1036F TOBACCO NON-USER: CPT | Performed by: INTERNAL MEDICINE

## 2019-01-02 PROCEDURE — 99214 OFFICE O/P EST MOD 30 MIN: CPT | Performed by: INTERNAL MEDICINE

## 2019-01-02 PROCEDURE — 3008F BODY MASS INDEX DOCD: CPT | Performed by: INTERNAL MEDICINE

## 2019-01-02 RX ORDER — MIRTAZAPINE 15 MG/1
15 TABLET, FILM COATED ORAL 2 TIMES DAILY
Qty: 60 TABLET | Refills: 5 | Status: SHIPPED | OUTPATIENT
Start: 2019-01-02 | End: 2020-02-14 | Stop reason: ALTCHOICE

## 2019-01-02 NOTE — ASSESSMENT & PLAN NOTE
Still complains of intermittent arthralgia - hands, knees, feet and shoulders  Reviewed labs, refer to rheumatology

## 2019-01-02 NOTE — PROGRESS NOTES
Assessment/Plan:    Iron deficiency anemia  Hemoglobin now within normal, continue daily iron supplement  Vitamin B12 deficiency  Tolerating monthly B12 injections  Primary narcolepsy with cataplexy  Followed by sleep  Arthralgia  Still complains of intermittent arthralgia - hands, knees, feet and shoulders  Reviewed labs, refer to rheumatology  Anxiety  Sees psychiatry monthly  Bupropion has been stopped, on venlafaxine, gabapentin and clonazepam  Increase mirtazapine to 15 mg bid  Class 2 obesity due to excess calories without serious comorbidity with body mass index (BMI) of 35 0 to 35 9 in adult  Lost 20 lbs since last visit, attributes to stress  External hemorrhoid  Discussed high fiber diet, avoid straining  Migraine with aura and without status migrainosus, not intractable  Stable, occurs about twice a month  Mild intermittent asthma without complication  No recent episodes  Diagnoses and all orders for this visit:    Arthralgia, unspecified joint  -     Ambulatory referral to Rheumatology; Future  -     Comprehensive metabolic panel; Future    Primary narcolepsy with cataplexy  -     mirtazapine (REMERON) 15 mg tablet; Take 1 tablet (15 mg total) by mouth 2 (two) times a day for 30 days    Class 2 obesity due to excess calories without serious comorbidity with body mass index (BMI) of 35 0 to 35 9 in adult  -     Comprehensive metabolic panel; Future    S/P gastric bypass    Vitamin B12 deficiency  -     Vitamin B12; Future  -     Folate; Future    Other iron deficiency anemia  -     CBC and differential; Future  -     Vitamin B12; Future  -     Ferritin; Future  -     Iron; Future    Vitamin D deficiency  -     Vitamin D 25 hydroxy; Future    Prediabetes  -     Hemoglobin A1C; Future    Health counseling  Comments:  Mammogram due  Declines flu vaccine due to reacation last year  Follow up in 4 months or as needed        Subjective:      Patient ID: Nicholas Macias is a 57 y o  female  Mrs Diego Laird has been feeling stressed recently with her 's illness  She still complains of intermittent joint pains, in her shoulders, elbows, hands, knees and feelt  It can occur one at a time or all together  She complains of hand pain worse in the morning, gets better later in the day  Denies any joint swelling  Denies any recent injury or falls  Today, she reports right ankle and heel pain is acting up  She see Psychiatry once a month, Wellbutrin has been stopped  She is asking to increase the mirtazapine to a higher dose  She reports migraine headaches have been good, occurs about twice a month  She is tolerating her iron supplement, takes 1 a day  She gets constipated sometimes, uses preparation H as needed  She does drink a lot of water daily  She noticed that she has more color, not as pale anymore  The following portions of the patient's history were reviewed and updated as appropriate: allergies, current medications, past medical history, past social history and problem list     Review of Systems   Constitutional: Negative for appetite change and fatigue  HENT: Negative for congestion and postnasal drip  Respiratory: Negative for cough and shortness of breath  Cardiovascular: Negative for chest pain and leg swelling  Gastrointestinal: Positive for constipation  Negative for abdominal pain  Genitourinary: Negative for dysuria  Musculoskeletal: Positive for arthralgias  Negative for gait problem, joint swelling, myalgias and neck pain  Skin: Negative for rash and wound  Neurological: Negative for dizziness, weakness, numbness and headaches  Psychiatric/Behavioral: Negative for confusion, dysphoric mood and sleep disturbance  The patient is not nervous/anxious            Objective:      /82   Pulse 93   Temp 98 6 °F (37 °C)   Resp 18   Ht 5' 5" (1 651 m)   Wt 86 2 kg (190 lb)   SpO2 99%   BMI 31 62 kg/m²          Physical Exam Constitutional: She is oriented to person, place, and time  She appears well-developed and well-nourished  HENT:   Head: Normocephalic and atraumatic  Nose: Nose normal    Eyes: Pupils are equal, round, and reactive to light  Conjunctivae are normal    Neck: Neck supple  Cardiovascular: Normal rate, regular rhythm and normal heart sounds  No edema  Pulmonary/Chest: Effort normal and breath sounds normal  She has no wheezes  She has no rales  Abdominal: Soft  Bowel sounds are normal    Musculoskeletal:        Right ankle: She exhibits no swelling  No tenderness  Left ankle: She exhibits no swelling  No tenderness  Right hand: She exhibits no swelling  Left hand: She exhibits no swelling  Neurological: She is alert and oriented to person, place, and time  Skin: Skin is warm  No rash noted  Psychiatric: She has a normal mood and affect  Her behavior is normal    Nursing note and vitals reviewed  Lab results reviewed with patient

## 2019-01-02 NOTE — ASSESSMENT & PLAN NOTE
Sees psychiatry monthly  Bupropion has been stopped, on venlafaxine, gabapentin and clonazepam  Increase mirtazapine to 15 mg bid

## 2019-02-04 DIAGNOSIS — E53.8 VITAMIN B12 DEFICIENCY: ICD-10-CM

## 2019-02-04 RX ORDER — CYANOCOBALAMIN 1000 UG/ML
1000 INJECTION INTRAMUSCULAR; SUBCUTANEOUS
Qty: 10 ML | Refills: 0 | Status: SHIPPED | OUTPATIENT
Start: 2019-02-04 | End: 2019-04-30 | Stop reason: SDUPTHER

## 2019-02-04 NOTE — TELEPHONE ENCOUNTER
Regarding: Prescription Question  ----- Message from Saúl Lancaster sent at 2/4/2019 10:01 AM EST -----       ----- Message from Cheng Brown to Leslie Eckert MD sent at 2/4/2019  9:51 AM -----   I have run out of my B12 shots can someone call it in for me  To [facility] [address]  Thank you!

## 2019-03-27 DIAGNOSIS — G47.411 NARCOLEPSY AND CATAPLEXY: ICD-10-CM

## 2019-04-30 DIAGNOSIS — E53.8 VITAMIN B12 DEFICIENCY: ICD-10-CM

## 2019-05-01 RX ORDER — CYANOCOBALAMIN 1000 UG/ML
INJECTION INTRAMUSCULAR; SUBCUTANEOUS
Qty: 10 ML | Refills: 0 | Status: SHIPPED | OUTPATIENT
Start: 2019-05-01 | End: 2020-06-03 | Stop reason: ALTCHOICE

## 2019-05-03 ENCOUNTER — OFFICE VISIT (OUTPATIENT)
Dept: INTERNAL MEDICINE CLINIC | Facility: CLINIC | Age: 49
End: 2019-05-03
Payer: COMMERCIAL

## 2019-05-03 ENCOUNTER — TELEPHONE (OUTPATIENT)
Dept: INTERNAL MEDICINE CLINIC | Facility: CLINIC | Age: 49
End: 2019-05-03

## 2019-05-03 VITALS
OXYGEN SATURATION: 99 % | WEIGHT: 193.8 LBS | SYSTOLIC BLOOD PRESSURE: 112 MMHG | BODY MASS INDEX: 32.29 KG/M2 | RESPIRATION RATE: 18 BRPM | DIASTOLIC BLOOD PRESSURE: 80 MMHG | TEMPERATURE: 98.2 F | HEIGHT: 65 IN | HEART RATE: 94 BPM

## 2019-05-03 DIAGNOSIS — G47.411 PRIMARY NARCOLEPSY WITH CATAPLEXY: Primary | ICD-10-CM

## 2019-05-03 DIAGNOSIS — F41.9 ANXIETY: ICD-10-CM

## 2019-05-03 DIAGNOSIS — E53.8 VITAMIN B12 DEFICIENCY: ICD-10-CM

## 2019-05-03 DIAGNOSIS — J45.20 MILD INTERMITTENT ASTHMA WITHOUT COMPLICATION: ICD-10-CM

## 2019-05-03 DIAGNOSIS — J45.20 MILD INTERMITTENT ASTHMA WITHOUT COMPLICATION: Primary | ICD-10-CM

## 2019-05-03 DIAGNOSIS — Z98.84 S/P GASTRIC BYPASS: ICD-10-CM

## 2019-05-03 DIAGNOSIS — D50.8 OTHER IRON DEFICIENCY ANEMIA: ICD-10-CM

## 2019-05-03 DIAGNOSIS — Z71.9 HEALTH COUNSELING: ICD-10-CM

## 2019-05-03 DIAGNOSIS — E66.09 CLASS 2 OBESITY DUE TO EXCESS CALORIES WITHOUT SERIOUS COMORBIDITY WITH BODY MASS INDEX (BMI) OF 35.0 TO 35.9 IN ADULT: ICD-10-CM

## 2019-05-03 PROCEDURE — 99214 OFFICE O/P EST MOD 30 MIN: CPT | Performed by: INTERNAL MEDICINE

## 2019-05-03 RX ORDER — BUPROPION HYDROCHLORIDE 75 MG/1
TABLET ORAL
Qty: 30 TABLET | Refills: 0
Start: 2019-05-03 | End: 2020-06-03 | Stop reason: ALTCHOICE

## 2019-05-03 RX ORDER — VENLAFAXINE 100 MG/1
200 TABLET ORAL 3 TIMES DAILY
Qty: 90 TABLET | Refills: 0
Start: 2019-05-03 | End: 2019-11-15

## 2019-05-03 RX ORDER — LEVALBUTEROL TARTRATE 45 UG/1
1-2 AEROSOL, METERED ORAL EVERY 6 HOURS PRN
Qty: 15 INHALER | Refills: 1 | Status: SHIPPED | OUTPATIENT
Start: 2019-05-03 | End: 2019-05-14 | Stop reason: ALTCHOICE

## 2019-05-03 RX ORDER — CYANOCOBALAMIN 1000 UG/ML
INJECTION INTRAMUSCULAR; SUBCUTANEOUS
Qty: 10 ML | Refills: 0 | Status: CANCELLED | OUTPATIENT
Start: 2019-05-03

## 2019-06-07 DIAGNOSIS — D64.89 ANEMIA DUE TO OTHER CAUSE, NOT CLASSIFIED: ICD-10-CM

## 2019-06-07 RX ORDER — FERROUS SULFATE 325(65) MG
325 TABLET ORAL DAILY
Qty: 90 TABLET | Refills: 1 | Status: SHIPPED | OUTPATIENT
Start: 2019-06-07 | End: 2019-09-06 | Stop reason: SDUPTHER

## 2019-06-26 ENCOUNTER — TELEPHONE (OUTPATIENT)
Dept: SLEEP CENTER | Facility: CLINIC | Age: 49
End: 2019-06-26

## 2019-07-01 ENCOUNTER — OFFICE VISIT (OUTPATIENT)
Dept: RHEUMATOLOGY | Facility: CLINIC | Age: 49
End: 2019-07-01
Payer: COMMERCIAL

## 2019-07-01 VITALS — HEART RATE: 96 BPM | WEIGHT: 193 LBS | BODY MASS INDEX: 32.15 KG/M2 | HEIGHT: 65 IN

## 2019-07-01 DIAGNOSIS — E55.9 VITAMIN D DEFICIENCY: ICD-10-CM

## 2019-07-01 DIAGNOSIS — M54.50 CHRONIC BILATERAL LOW BACK PAIN WITHOUT SCIATICA: ICD-10-CM

## 2019-07-01 DIAGNOSIS — G89.29 CHRONIC NECK PAIN: ICD-10-CM

## 2019-07-01 DIAGNOSIS — M25.50 ARTHRALGIA, UNSPECIFIED JOINT: ICD-10-CM

## 2019-07-01 DIAGNOSIS — M25.50 POLYARTHRALGIA: Primary | ICD-10-CM

## 2019-07-01 DIAGNOSIS — M54.2 CHRONIC NECK PAIN: ICD-10-CM

## 2019-07-01 DIAGNOSIS — G89.29 CHRONIC BILATERAL LOW BACK PAIN WITHOUT SCIATICA: ICD-10-CM

## 2019-07-01 PROCEDURE — 99204 OFFICE O/P NEW MOD 45 MIN: CPT | Performed by: INTERNAL MEDICINE

## 2019-07-01 NOTE — PROGRESS NOTES
Assessment and Plan:   Patient is a 46-year-old female with for history of gastric bypass surgery, depression and anxiety, who presents for rheumatology consult regarding polyarthralgia and reported prior diagnosis of possible psoriatic arthritis  I do not have these records available for review from her prior rheumatologist but they are also from about 20 years ago  She describes some features of inflammatory joint pain with her morning stiffness that improves with activity  On exam there is no obvious synovitis or dactylitis, and she has many fibromyalgia tender points present  I discussed with her that fibromyalgia likely contributes to a lot of her pain but we will make sure we are not missing an underlying inflammatory arthropathy  She does have evidence of the biceps tenosynovitis along with the inflammation around the elbow from last year but these are nonspecific and could be idiopathic  She had negative rheumatologic testing a few months ago with a negative MILAGRO, RF and CCP with normal inflammatory markers as well  We will do a few additional tests along with x-rays to look for any changes of inflammatory arthropathy  She was agreeable with this plan and we also will not make any medication changes or suggestions at this time  She will follow-up in about 8 weeks to review  Plan:  Diagnoses and all orders for this visit:    Polyarthralgia  -     Sjogren's Antibodies; Future  -     Sedimentation rate, automated  -     HLA-B27 antigen  -     C-reactive protein  -     Comprehensive metabolic panel  -     CBC and differential  -     CK  -     Chronic Hepatitis Panel  -     Vitamin D 25 hydroxy  -     XR hand 3+ vw left; Future  -     XR hand 3+ vw right; Future  -     XR sacroiliac joints 3+ views; Future  -     XR knee 3 vw left non injury; Future  -     XR knee 3 vw right non injury;  Future    Arthralgia, unspecified joint  -     Ambulatory referral to Rheumatology    Vitamin D deficiency    Chronic bilateral low back pain without sciatica  -     XR sacroiliac joints 3+ views; Future    Chronic neck pain  -     XR spine cervical complete 4 or 5 vw non injury; Future        Follow-up plan: 8 weeks       HPI  Vianney Hester is a 54 y o   female with narcolepsy, ANN, vitamin B12, depression, anxiety, obesity, migraines, asthma, s/p gastric bypass who presents for rheumatology consult by request of Dr Sunitha Ellison for polyarthralgia  She reports she saw Dr Rebecca Marti about 20 years ago, who felt she had psoriasis on her scalp and treated her for possible psoriatic arthritis  She states her treated her "with anything and everything", but cannot recall names of any medications she tried  None of the medications she tried helped either  She is not sure that she has had psoriasis on her extremities  She still gets flakiness in her scalp but has never had a definite diagnosis of psoriasis on her scalp and has never seen a dermatologist   She denies any inflammatory bowel disease or inflammatory eye disease  She has family members with psoriasis but none of which have psoriatic arthritis  Worst joint pain in knees, shoulders and neck  This started over 20 years ago  Morning stiffness in shoulders, neck, and knees which lasts about 1-1 5 hours before improving  She is taking 3 500mg tylenol and 800mg ibuprofen everyday  She is on gabapentin through her psychiatrist and was on cymbalta but then he switched her to venlafaxine 2-3 years ago for her depression  Gabapentin dose is at 4pm, at bedtime and then at 2am   She is unsure of the dose of the gabapentin  She recalls doing physical therapy last year when she was having issues with her left shoulder and had an MRI of the shoulder and elbow that showed bicipital tenosynovitis and olecranon bursitis  She did receive injections for these from the orthopedic she was seeing at the time and this ultimately did resolve    She generally does not feel physical therapy ever helped her  She has never done aquatic therapy and states that she does not really like to go in pools  She does not exercise much on her own and feels that she is fairly active throughout the day  Denies photosensitivity, rashes, sicca symptoms, oral or nasal ulcers, alopecia, Raynaud's, h/o pericarditis or pleurisy, h/o blood clots  Review of Systems  Review of Systems   Constitutional: Negative for chills, fatigue, fever and unexpected weight change  HENT: Negative for mouth sores and trouble swallowing  Eyes: Negative for pain and visual disturbance  Respiratory: Negative for cough and shortness of breath  Cardiovascular: Negative for chest pain and leg swelling  Gastrointestinal: Positive for constipation  Negative for abdominal pain, blood in stool, diarrhea and nausea  Genitourinary: Negative for hematuria  Musculoskeletal: Positive for arthralgias, back pain and neck pain  Negative for joint swelling and myalgias  Skin: Positive for rash (?psoriasis)  Negative for color change  Neurological: Negative for weakness and numbness  Hematological: Negative for adenopathy  Psychiatric/Behavioral: Negative for sleep disturbance         Allergies  Allergies   Allergen Reactions    Codeine GI Intolerance     heartburn    Other      "ALL PAIN MEDICATIONS"    Morphine Itching and Rash       Home Medications    Current Outpatient Medications:     Albuterol Sulfate (PROAIR RESPICLICK) 101 (90 Base) MCG/ACT AEPB, Inhale 1 puff every 6 (six) hours as needed (wheezing), Disp: 1 each, Rfl: 1    buPROPion (WELLBUTRIN) 75 mg tablet, 1/4 tab tid, Disp: 30 tablet, Rfl: 0    clonazePAM (KlonoPIN) 0 5 mg tablet, Take 0 5 mg by mouth 4 (four) times a day, Disp: , Rfl:     cyanocobalamin 1,000 mcg/mL, INJECT 1ML IN THE MUSCLE EVERY 30 DAYS, Disp: 10 mL, Rfl: 0    ferrous sulfate 325 (65 Fe) mg tablet, Take 1 tablet (325 mg total) by mouth daily, Disp: 90 tablet, Rfl: 1    gabapentin (NEURONTIN) 300 mg capsule, Take 100 mg by mouth 4 (four) times a day, Disp: , Rfl:     Sodium Oxybate (XYREM) 500 MG/ML SOLN, First dose (bedtime): 4 5 g + Second dose (2 5-4 hrs later): 4 5 g = 9 g Total Nightly Dose [Total Qty: 1 month(s)], Disp: 270 g, Rfl: 5    SYRINGE-NEEDLE, DISP, 3 ML 25G X 1" 3 ML MISC, by Does not apply route every 30 (thirty) days, Disp: 6 each, Rfl: 1    venlafaxine (EFFEXOR) 100 MG tablet, Take 2 tablets (200 mg total) by mouth 3 (three) times a day, Disp: 90 tablet, Rfl: 0    mirtazapine (REMERON) 15 mg tablet, Take 1 tablet (15 mg total) by mouth 2 (two) times a day for 30 days, Disp: 60 tablet, Rfl: 5    Past Medical History  Past Medical History:   Diagnosis Date    Anxiety     Asthma     childhood    Depression     Narcolepsy     Obesity        Past Surgical History   Past Surgical History:   Procedure Laterality Date     SECTION      CHOLECYSTECTOMY      KNEE ARTHROSCOPY Left        Family History  No known family history of autoimmune or inflammatory diseases      Family History   Problem Relation Age of Onset    Hypertension Mother     Depression Mother     Atrial fibrillation Mother     Hypertension Father     Depression Father     Heart failure Father     Diabetes Maternal Grandmother     Stroke Maternal Grandmother     Lung cancer Paternal Grandfather     Brain cancer Maternal Grandfather     Alcohol abuse Neg Hx     Substance Abuse Neg Hx     Mental illness Neg Hx        Social History  Occupation: on disability for mental health issues and narcolepsy   Social History     Substance and Sexual Activity   Alcohol Use No     Social History     Substance and Sexual Activity   Drug Use No     Social History     Tobacco Use   Smoking Status Never Smoker   Smokeless Tobacco Never Used       Objective:    Vitals:    19 1014   Pulse: 96   Weight: 87 5 kg (193 lb)   Height: 5' 5" (1 651 m)       Physical Exam   Constitutional: She appears well-developed and well-nourished  No distress  HENT:   Head: Normocephalic and atraumatic  Mouth/Throat: Oropharynx is clear and moist and mucous membranes are normal    Eyes: Conjunctivae and EOM are normal  No scleral icterus  Neck: Neck supple  No spinous process tenderness and no muscular tenderness present  No thyromegaly present  Cardiovascular: Normal rate, regular rhythm, S1 normal and S2 normal  Exam reveals no friction rub  No murmur heard  Pulmonary/Chest: Effort normal and breath sounds normal  No respiratory distress  She has no wheezes  She has no rhonchi  She has no rales  Abdominal: Soft  She exhibits no distension  There is no hepatosplenomegaly  There is no tenderness  Musculoskeletal:   12/18 Fibromyalgia tender points present  Peripheral joints tender in shoulders, elbows, and knees, but no obvious swelling or synovitis anywhere  Lymphadenopathy:     She has no cervical adenopathy  Neurological: She is alert  She has normal strength  No sensory deficit  Skin: Skin is warm and dry  No rash noted  Nails show no clubbing  Psychiatric: She has a normal mood and affect  Imaging:   Left elbow MRI 8/9/18:  IMPRESSION:  Tendinopathy of the insertional fibers of the triceps without tear or retraction  At the insertion there is also edema of the olecranon, likely reactive and there is a thin rim of overlying fluid, compatible with mild olecranon bursitis  The ulnar   nerve is mildly enlarged and edematous within the cubital tunnel  There is no obvious abnormality of the common extensor origin    Left shoulder MRI 5/16/18: IMPRESSION:  There is moderate long head of the biceps tenosynovitis without tear (series 3 image 11 )  Mild acromioclavicular joint osteoarthritis  There is no evidence of rotator cuff tear      Labs:   Component      Latest Ref Rng & Units 12/7/2018   WBC      4 31 - 10 16 Thousand/uL 6 40   Red Blood Cell Count      3 81 - 5 12 Million/uL 4 96 Hemoglobin      11 5 - 15 4 g/dL 12 8   HCT      34 8 - 46 1 % 41 9   MCV      82 - 98 fL 85   MCH      26 8 - 34 3 pg 25 8 (L)   MCHC      31 4 - 37 4 g/dL 30 5 (L)   RDW      11 6 - 15 1 % 22 3 (H)   MPV      8 9 - 12 7 fL 12 3   Platelet Count      491 - 390 Thousands/uL 323   nRBC      /100 WBCs 0   Neutrophils %      43 - 75 % 51   Immat GRANS %      0 - 2 % 0   Lymphocytes Relative      14 - 44 % 37   Monocytes Relative      4 - 12 % 10   Eosinophils      0 - 6 % 1   Basophils Relative      0 - 1 % 1   Absolute Neutrophils      1 85 - 7 62 Thousands/µL 3 29   Immature Grans Absolute      0 00 - 0 20 Thousand/uL 0 01   Lymphocytes Absolute      0 60 - 4 47 Thousands/µL 2 35   Absolute Monocytes      0 17 - 1 22 Thousand/µL 0 63   Absolute Eosinophils      0 00 - 0 61 Thousand/µL 0 08   Basophils Absolute      0 00 - 0 10 Thousands/µL 0 04   Sodium      136 - 145 mmol/L 141   Potassium      3 5 - 5 3 mmol/L 3 9   Chloride      100 - 108 mmol/L 106   CO2      21 - 32 mmol/L 28   Anion Gap      4 - 13 mmol/L 7   BUN      5 - 25 mg/dL 9   Creatinine      0 60 - 1 30 mg/dL 0 76   GLUCOSE FASTING      65 - 99 mg/dL 68   Calcium      8 3 - 10 1 mg/dL 9 0   AST      5 - 45 U/L 77 (H)   ALT      12 - 78 U/L 39   Alkaline Phosphatase      46 - 116 U/L 155 (H)   Total Protein      6 4 - 8 2 g/dL 7 9   Albumin      3 5 - 5 0 g/dL 3 4 (L)   TOTAL BILIRUBIN      0 20 - 1 00 mg/dL 0 20   eGFR      ml/min/1 73sq m 94   Ferritin      8 - 388 ng/mL 25   Iron      50 - 170 ug/dL 124     Component      Latest Ref Rng & Units 10/4/2018   Vitamin B-12      100 - 900 pg/mL 193   Vit D, 25-Hydroxy      30 0 - 100 0 ng/mL 19 2 (L)     Component      Latest Ref Rng & Units 10/4/2018   TSH 3RD GENERATON      0 358 - 3 740 uIU/mL 1 590   ANTI-NUCLEAR ANTIBODY (MILAGRO)      Negative Negative   C-REACTIVE PROTEIN      <3 0 mg/L <3 0   RHEUMATOID FACTOR      Negative Negative   Sed Rate      0 - 20 mm/hour 11

## 2019-08-27 DIAGNOSIS — G47.411 NARCOLEPSY AND CATAPLEXY: ICD-10-CM

## 2019-09-04 DIAGNOSIS — F40.232 PHOBIA OF DENTAL PROCEDURE: Primary | ICD-10-CM

## 2019-09-04 RX ORDER — DIAZEPAM 5 MG/1
TABLET ORAL
Qty: 4 TABLET | Refills: 0 | Status: SHIPPED | OUTPATIENT
Start: 2019-09-04 | End: 2019-09-06 | Stop reason: SDUPTHER

## 2019-09-06 DIAGNOSIS — D64.89 ANEMIA DUE TO OTHER CAUSE, NOT CLASSIFIED: ICD-10-CM

## 2019-09-06 DIAGNOSIS — F40.232 PHOBIA OF DENTAL PROCEDURE: ICD-10-CM

## 2019-09-06 RX ORDER — DIAZEPAM 5 MG/1
TABLET ORAL
Qty: 4 TABLET | Refills: 0 | Status: SHIPPED | OUTPATIENT
Start: 2019-09-06 | End: 2019-09-06 | Stop reason: SDUPTHER

## 2019-09-06 RX ORDER — FERROUS SULFATE 325(65) MG
325 TABLET ORAL DAILY
Qty: 90 TABLET | Refills: 1 | Status: SHIPPED | OUTPATIENT
Start: 2019-09-06 | End: 2019-11-05 | Stop reason: SDUPTHER

## 2019-09-06 RX ORDER — DIAZEPAM 5 MG/1
TABLET ORAL
Qty: 4 TABLET | Refills: 0 | Status: SHIPPED | OUTPATIENT
Start: 2019-09-06 | End: 2019-10-02 | Stop reason: SDUPTHER

## 2019-09-06 NOTE — TELEPHONE ENCOUNTER
Patients  came in states the Valium for her dental procedure worked great  She has 2 more appointments coming up next week can she get another 4 vailum?

## 2019-10-01 ENCOUNTER — TELEPHONE (OUTPATIENT)
Dept: OTHER | Facility: OTHER | Age: 49
End: 2019-10-01

## 2019-10-01 ENCOUNTER — TRANSCRIBE ORDERS (OUTPATIENT)
Dept: ADMINISTRATIVE | Facility: HOSPITAL | Age: 49
End: 2019-10-01

## 2019-10-01 ENCOUNTER — TELEPHONE (OUTPATIENT)
Dept: INTERNAL MEDICINE CLINIC | Facility: CLINIC | Age: 49
End: 2019-10-01

## 2019-10-01 DIAGNOSIS — Z12.31 VISIT FOR SCREENING MAMMOGRAM: Primary | ICD-10-CM

## 2019-10-01 NOTE — TELEPHONE ENCOUNTER
Pt  feels as though there is pressure coming up from her chin to her forehead  Said she has told you about this before  Also, sometimes hears sounds like a sparkler that goes from temple to temple in her head  She said it happens more when she turns her head than if she keeps it still  She said she is going to schedule w/ an Opthomologist to get her eyes checked  She said her child has CF, and one of the things you have is benign brain growths  Pt  Does not have CF, but is concerned  Pt  would feel more comfortable if she has an MRI done

## 2019-10-02 ENCOUNTER — OFFICE VISIT (OUTPATIENT)
Dept: INTERNAL MEDICINE CLINIC | Facility: CLINIC | Age: 49
End: 2019-10-02
Payer: COMMERCIAL

## 2019-10-02 ENCOUNTER — TELEPHONE (OUTPATIENT)
Dept: INTERNAL MEDICINE CLINIC | Facility: CLINIC | Age: 49
End: 2019-10-02

## 2019-10-02 ENCOUNTER — APPOINTMENT (OUTPATIENT)
Dept: LAB | Facility: CLINIC | Age: 49
End: 2019-10-02
Payer: COMMERCIAL

## 2019-10-02 VITALS
WEIGHT: 184 LBS | BODY MASS INDEX: 30.66 KG/M2 | RESPIRATION RATE: 18 BRPM | OXYGEN SATURATION: 98 % | DIASTOLIC BLOOD PRESSURE: 74 MMHG | SYSTOLIC BLOOD PRESSURE: 106 MMHG | HEART RATE: 78 BPM | TEMPERATURE: 98.2 F | HEIGHT: 65 IN

## 2019-10-02 DIAGNOSIS — M25.50 POLYARTHRALGIA: ICD-10-CM

## 2019-10-02 DIAGNOSIS — M25.50 ARTHRALGIA, UNSPECIFIED JOINT: ICD-10-CM

## 2019-10-02 DIAGNOSIS — K21.9 GASTROESOPHAGEAL REFLUX DISEASE, ESOPHAGITIS PRESENCE NOT SPECIFIED: ICD-10-CM

## 2019-10-02 DIAGNOSIS — F32.A DEPRESSION, UNSPECIFIED DEPRESSION TYPE: ICD-10-CM

## 2019-10-02 DIAGNOSIS — D50.8 OTHER IRON DEFICIENCY ANEMIA: ICD-10-CM

## 2019-10-02 DIAGNOSIS — E55.9 VITAMIN D DEFICIENCY: ICD-10-CM

## 2019-10-02 DIAGNOSIS — F40.232 PHOBIA OF DENTAL PROCEDURE: ICD-10-CM

## 2019-10-02 DIAGNOSIS — E53.8 VITAMIN B12 DEFICIENCY: ICD-10-CM

## 2019-10-02 DIAGNOSIS — R68.89 COMPLAINING OF HEAD SYMPTOM: Primary | ICD-10-CM

## 2019-10-02 DIAGNOSIS — F41.9 ANXIETY: ICD-10-CM

## 2019-10-02 LAB
25(OH)D3 SERPL-MCNC: 19.4 NG/ML (ref 30–100)
ALBUMIN SERPL BCP-MCNC: 3.6 G/DL (ref 3.5–5)
ALP SERPL-CCNC: 133 U/L (ref 46–116)
ALT SERPL W P-5'-P-CCNC: 24 U/L (ref 12–78)
ANION GAP SERPL CALCULATED.3IONS-SCNC: 5 MMOL/L (ref 4–13)
AST SERPL W P-5'-P-CCNC: 18 U/L (ref 5–45)
BASOPHILS # BLD AUTO: 0.03 THOUSANDS/ΜL (ref 0–0.1)
BASOPHILS NFR BLD AUTO: 1 % (ref 0–1)
BILIRUB SERPL-MCNC: 0.32 MG/DL (ref 0.2–1)
BUN SERPL-MCNC: 9 MG/DL (ref 5–25)
CALCIUM SERPL-MCNC: 9.3 MG/DL (ref 8.3–10.1)
CHLORIDE SERPL-SCNC: 108 MMOL/L (ref 100–108)
CK SERPL-CCNC: 34 U/L (ref 26–192)
CO2 SERPL-SCNC: 30 MMOL/L (ref 21–32)
CREAT SERPL-MCNC: 0.78 MG/DL (ref 0.6–1.3)
CRP SERPL QL: <3 MG/L
EOSINOPHIL # BLD AUTO: 0.08 THOUSAND/ΜL (ref 0–0.61)
EOSINOPHIL NFR BLD AUTO: 2 % (ref 0–6)
ERYTHROCYTE [DISTWIDTH] IN BLOOD BY AUTOMATED COUNT: 13.1 % (ref 11.6–15.1)
ERYTHROCYTE [SEDIMENTATION RATE] IN BLOOD: 7 MM/HOUR (ref 0–20)
FERRITIN SERPL-MCNC: 48 NG/ML (ref 8–388)
GFR SERPL CREATININE-BSD FRML MDRD: 91 ML/MIN/1.73SQ M
GLUCOSE SERPL-MCNC: 68 MG/DL (ref 65–140)
HBV CORE AB SER QL: NORMAL
HBV CORE IGM SER QL: NORMAL
HBV SURFACE AG SER QL: NORMAL
HCT VFR BLD AUTO: 41.8 % (ref 34.8–46.1)
HCV AB SER QL: NORMAL
HGB BLD-MCNC: 12.9 G/DL (ref 11.5–15.4)
IMM GRANULOCYTES # BLD AUTO: 0.01 THOUSAND/UL (ref 0–0.2)
IMM GRANULOCYTES NFR BLD AUTO: 0 % (ref 0–2)
IRON SERPL-MCNC: 98 UG/DL (ref 50–170)
LYMPHOCYTES # BLD AUTO: 1.82 THOUSANDS/ΜL (ref 0.6–4.47)
LYMPHOCYTES NFR BLD AUTO: 36 % (ref 14–44)
MCH RBC QN AUTO: 27.8 PG (ref 26.8–34.3)
MCHC RBC AUTO-ENTMCNC: 30.9 G/DL (ref 31.4–37.4)
MCV RBC AUTO: 90 FL (ref 82–98)
MONOCYTES # BLD AUTO: 0.44 THOUSAND/ΜL (ref 0.17–1.22)
MONOCYTES NFR BLD AUTO: 9 % (ref 4–12)
NEUTROPHILS # BLD AUTO: 2.65 THOUSANDS/ΜL (ref 1.85–7.62)
NEUTS SEG NFR BLD AUTO: 52 % (ref 43–75)
NRBC BLD AUTO-RTO: 0 /100 WBCS
PLATELET # BLD AUTO: 231 THOUSANDS/UL (ref 149–390)
PMV BLD AUTO: 11.6 FL (ref 8.9–12.7)
POTASSIUM SERPL-SCNC: 3.7 MMOL/L (ref 3.5–5.3)
PROT SERPL-MCNC: 6.9 G/DL (ref 6.4–8.2)
RBC # BLD AUTO: 4.64 MILLION/UL (ref 3.81–5.12)
SODIUM SERPL-SCNC: 143 MMOL/L (ref 136–145)
VIT B12 SERPL-MCNC: 1401 PG/ML (ref 100–900)
WBC # BLD AUTO: 5.03 THOUSAND/UL (ref 4.31–10.16)

## 2019-10-02 PROCEDURE — 87340 HEPATITIS B SURFACE AG IA: CPT | Performed by: INTERNAL MEDICINE

## 2019-10-02 PROCEDURE — 81374 HLA I TYPING 1 ANTIGEN LR: CPT | Performed by: INTERNAL MEDICINE

## 2019-10-02 PROCEDURE — 86705 HEP B CORE ANTIBODY IGM: CPT | Performed by: INTERNAL MEDICINE

## 2019-10-02 PROCEDURE — 36415 COLL VENOUS BLD VENIPUNCTURE: CPT | Performed by: INTERNAL MEDICINE

## 2019-10-02 PROCEDURE — 86235 NUCLEAR ANTIGEN ANTIBODY: CPT

## 2019-10-02 PROCEDURE — 3008F BODY MASS INDEX DOCD: CPT | Performed by: INTERNAL MEDICINE

## 2019-10-02 PROCEDURE — 86140 C-REACTIVE PROTEIN: CPT | Performed by: INTERNAL MEDICINE

## 2019-10-02 PROCEDURE — 82550 ASSAY OF CK (CPK): CPT | Performed by: INTERNAL MEDICINE

## 2019-10-02 PROCEDURE — 83540 ASSAY OF IRON: CPT | Performed by: INTERNAL MEDICINE

## 2019-10-02 PROCEDURE — 86704 HEP B CORE ANTIBODY TOTAL: CPT | Performed by: INTERNAL MEDICINE

## 2019-10-02 PROCEDURE — 99214 OFFICE O/P EST MOD 30 MIN: CPT | Performed by: INTERNAL MEDICINE

## 2019-10-02 PROCEDURE — 85652 RBC SED RATE AUTOMATED: CPT | Performed by: INTERNAL MEDICINE

## 2019-10-02 PROCEDURE — 82306 VITAMIN D 25 HYDROXY: CPT | Performed by: INTERNAL MEDICINE

## 2019-10-02 PROCEDURE — 80053 COMPREHEN METABOLIC PANEL: CPT | Performed by: INTERNAL MEDICINE

## 2019-10-02 PROCEDURE — 82607 VITAMIN B-12: CPT | Performed by: INTERNAL MEDICINE

## 2019-10-02 PROCEDURE — 85025 COMPLETE CBC W/AUTO DIFF WBC: CPT | Performed by: INTERNAL MEDICINE

## 2019-10-02 PROCEDURE — 82728 ASSAY OF FERRITIN: CPT | Performed by: INTERNAL MEDICINE

## 2019-10-02 PROCEDURE — 86803 HEPATITIS C AB TEST: CPT | Performed by: INTERNAL MEDICINE

## 2019-10-02 RX ORDER — GABAPENTIN 300 MG/1
300 CAPSULE ORAL 3 TIMES DAILY
Qty: 90 CAPSULE | Refills: 1 | Status: SHIPPED | OUTPATIENT
Start: 2019-10-02 | End: 2019-11-11

## 2019-10-02 RX ORDER — LANSOPRAZOLE 30 MG/1
30 CAPSULE, DELAYED RELEASE ORAL DAILY
Qty: 90 CAPSULE | Refills: 0 | Status: SHIPPED | OUTPATIENT
Start: 2019-10-02 | End: 2020-01-06

## 2019-10-02 RX ORDER — GABAPENTIN 100 MG/1
200 CAPSULE ORAL 4 TIMES DAILY
Qty: 90 CAPSULE | Refills: 0
Start: 2019-10-02 | End: 2019-11-11

## 2019-10-02 RX ORDER — DIAZEPAM 5 MG/1
TABLET ORAL
Qty: 16 TABLET | Refills: 0 | Status: SHIPPED | OUTPATIENT
Start: 2019-10-02 | End: 2020-06-03 | Stop reason: ALTCHOICE

## 2019-10-02 NOTE — TELEPHONE ENCOUNTER
You can stop the B12 injections and just take oral B12, 500 mcg daily  Continue your daily iron, levels have improved

## 2019-10-02 NOTE — ASSESSMENT & PLAN NOTE
Assessment:  Stable in RA. Several events on 2/28, stated on caffeine. Last event on 3/1. Plan:  Continue caffiene for now, consider stop in the next few days if no further events. Differential Dx: migraine vs tension HA, vertigo/BPPV, anxiety  (+) nystagmus, instructed to do exercises at home  Declined any head imaging at this time

## 2019-10-02 NOTE — PROGRESS NOTES
Assessment/Plan:    Complaining of head symptom  Differential Dx: migraine vs tension HA, vertigo/BPPV, anxiety  (+) nystagmus, instructed to do exercises at home  Declined any head imaging at this time  Arthralgia  Recently saw rheumatology  Suspect fibromyalgia, symptoms worse since duloxetine switched to venlafaxine  She developed side effect with Lyrica, already on gabapentin  Increase gabapentin, add 300 mg bid, increase to tid as tolerated  GERD (gastroesophageal reflux disease)  Instructed to take PPI daily for the next 2 weeks then every other day  Anxiety  Symptoms worse recently  Given Valium for sedation during dental work  Iron deficiency anemia  Takes iron daily  Vitamin B12 deficiency  On monthly injections  Depression  On bupropion, gabapentin, mirtazapine and venlafaxine  Diagnoses and all orders for this visit:    Complaining of head symptom    Vitamin D deficiency  -     Vitamin D 25 hydroxy    Vitamin B12 deficiency  -     Vitamin B12    Gastroesophageal reflux disease, esophagitis presence not specified  -     lansoprazole (PREVACID) 30 mg capsule; Take 1 capsule (30 mg total) by mouth daily    Other iron deficiency anemia  -     Iron  -     Ferritin    Phobia of dental procedure  -     diazepam (VALIUM) 5 mg tablet; Take 1 tablet 1 hour before procedure, may repeat 2 hours later  Arthralgia, unspecified joint  -     gabapentin (NEURONTIN) 300 mg capsule; Take 1 capsule (300 mg total) by mouth 3 (three) times a day    Anxiety    Depression, unspecified depression type  -     gabapentin (NEURONTIN) 100 mg capsule; Take 2 capsules (200 mg total) by mouth 4 (four) times a day      Follow up as scheduled or as needed  Subjective:      Patient ID: Eric Turner is a 54 y o  female  Jayde Christian has several concerns  First, she reports that the shooting pains across her head has been occurring more often    She feels it goes across her head either from the left or right side, no specific area  Denies any blurring of vision, bright lights does not bother her  She denies any nausea or vomiting  She does feel slightly dizzy when she moves her head from side to side  Symptoms have been occurring almost daily, says that she needs to stop what she is doing sometimes when it occurs  Denies any nausea or vomiting  She was initially interested with imaging since her daughter has cystic fibrosis and is concerned about this  Second, she complains of more frequent reflux symptoms  She has a sour taste in her mouth the past 3 weeks, started taking Prevacid about 3 days ago  Third, she has been going for dental work  She has been taking Valium instead of pain for sedation due to cost     Fourth, she reports increase muscle and joint pains since she stop taking Cymbalta  She was taking about 200 mg 3 times a day, this was switched to venlafaxine  Her psychiatrist does not want to restart Cymbalta since insurance would not pay for high doses  She is also taking gabapentin for the mood, takes it 4 times a day  She recalls taking Lyrica in the past which caused side effects  The following portions of the patient's history were reviewed and updated as appropriate: allergies, current medications, past medical history, past social history and problem list     Review of Systems   Constitutional: Positive for fatigue  HENT: Negative for ear pain, hearing loss and rhinorrhea  Respiratory: Negative for cough and shortness of breath  Cardiovascular: Negative for chest pain and palpitations  Gastrointestinal: Negative for abdominal pain  Neurological: Positive for headaches  Negative for dizziness, light-headedness and numbness  Psychiatric/Behavioral: Negative for agitation, confusion and sleep disturbance  The patient is nervous/anxious            Objective:      /74   Pulse 78   Temp 98 2 °F (36 8 °C)   Resp 18   Ht 5' 5" (1 651 m)   Wt 83 5 kg (184 lb)   SpO2 98%   BMI 30 62 kg/m²          Physical Exam   Constitutional: She is oriented to person, place, and time  Vital signs are normal  She appears well-developed  HENT:   Head: Normocephalic  Eyes: Pupils are equal, round, and reactive to light  Cardiovascular: Normal rate and regular rhythm  Pulmonary/Chest: Breath sounds normal    Abdominal: Bowel sounds are normal    Neurological: She is alert and oriented to person, place, and time  No cranial nerve deficit    (+) nystagmus horizontal L>R   Skin: Skin is warm  Psychiatric: Her behavior is normal  Her mood appears anxious  She does not exhibit a depressed mood  Lab results reviewed with patient

## 2019-10-02 NOTE — ASSESSMENT & PLAN NOTE
Recently saw rheumatology  Suspect fibromyalgia, symptoms worse since duloxetine switched to venlafaxine  She developed side effect with Lyrica, already on gabapentin  Increase gabapentin, add 300 mg bid, increase to tid as tolerated

## 2019-10-02 NOTE — PATIENT INSTRUCTIONS
Prevent your symptoms:   · Try to avoid sudden head movements  Stand up and lie down slowly  · Raise and support your head when you lie down  Place pillows under your upper back and head or rest in a recliner  · Change your position often when you are lying down  Try not to lie with your head on the same side for long periods of time  Roll over slowly  · Wear protective gear  when you ride a bike or play sports  A helmet helps protect your head from injury

## 2019-10-03 LAB
ENA SS-A AB SER-ACNC: <0.2 AI (ref 0–0.9)
ENA SS-B AB SER-ACNC: <0.2 AI (ref 0–0.9)

## 2019-10-04 LAB — 25(OH)D3 SERPL-MCNC: 18.3 NG/ML (ref 30–100)

## 2019-10-07 LAB — HLA-B27 QL NAA+PROBE: NEGATIVE

## 2019-11-04 ENCOUNTER — TELEPHONE (OUTPATIENT)
Dept: INTERNAL MEDICINE CLINIC | Facility: CLINIC | Age: 49
End: 2019-11-04

## 2019-11-04 DIAGNOSIS — R68.89 COMPLAINING OF HEAD SYMPTOM: Primary | ICD-10-CM

## 2019-11-04 DIAGNOSIS — R42 VERTIGO: ICD-10-CM

## 2019-11-04 DIAGNOSIS — G43.109 MIGRAINE WITH AURA AND WITHOUT STATUS MIGRAINOSUS, NOT INTRACTABLE: ICD-10-CM

## 2019-11-04 NOTE — TELEPHONE ENCOUNTER
Patient states you had mentioned ordering a Cat scan for her before and she states she would like to get it now  She has been falling more often and off balance

## 2019-11-05 DIAGNOSIS — D64.89 ANEMIA DUE TO OTHER CAUSE, NOT CLASSIFIED: ICD-10-CM

## 2019-11-05 DIAGNOSIS — G47.411 PRIMARY NARCOLEPSY WITH CATAPLEXY: ICD-10-CM

## 2019-11-05 DIAGNOSIS — F41.9 ANXIETY: ICD-10-CM

## 2019-11-05 DIAGNOSIS — F32.A DEPRESSION, UNSPECIFIED DEPRESSION TYPE: ICD-10-CM

## 2019-11-05 DIAGNOSIS — F40.232 PHOBIA OF DENTAL PROCEDURE: ICD-10-CM

## 2019-11-05 RX ORDER — GABAPENTIN 100 MG/1
200 CAPSULE ORAL 4 TIMES DAILY
Qty: 90 CAPSULE | Refills: 0
Start: 2019-11-05

## 2019-11-05 RX ORDER — MIRTAZAPINE 15 MG/1
15 TABLET, FILM COATED ORAL 2 TIMES DAILY
Qty: 60 TABLET | Refills: 0 | OUTPATIENT
Start: 2019-11-05 | End: 2019-12-05

## 2019-11-05 RX ORDER — FERROUS SULFATE 325(65) MG
325 TABLET ORAL DAILY
Qty: 90 TABLET | Refills: 1 | Status: SHIPPED | OUTPATIENT
Start: 2019-11-05 | End: 2021-04-06 | Stop reason: SDUPTHER

## 2019-11-05 RX ORDER — DIAZEPAM 5 MG/1
TABLET ORAL
Qty: 16 TABLET | Refills: 0 | OUTPATIENT
Start: 2019-11-05

## 2019-11-05 RX ORDER — VENLAFAXINE 100 MG/1
200 TABLET ORAL 3 TIMES DAILY
Qty: 90 TABLET | Refills: 0
Start: 2019-11-05

## 2019-11-05 RX ORDER — BUPROPION HYDROCHLORIDE 75 MG/1
TABLET ORAL
Qty: 30 TABLET | Refills: 0
Start: 2019-11-05

## 2019-11-08 ENCOUNTER — HOSPITAL ENCOUNTER (OUTPATIENT)
Dept: RADIOLOGY | Age: 49
Discharge: HOME | End: 2019-11-08
Payer: COMMERCIAL

## 2019-11-08 ENCOUNTER — TELEPHONE (OUTPATIENT)
Dept: INTERNAL MEDICINE CLINIC | Facility: CLINIC | Age: 49
End: 2019-11-08

## 2019-11-08 DIAGNOSIS — R42 VERTIGO: ICD-10-CM

## 2019-11-08 DIAGNOSIS — R68.89 COMPLAINING OF HEAD SYMPTOM: Primary | ICD-10-CM

## 2019-11-08 DIAGNOSIS — G43.109 MIGRAINE WITH AURA AND WITHOUT STATUS MIGRAINOSUS, NOT INTRACTABLE: ICD-10-CM

## 2019-11-08 DIAGNOSIS — R68.89 COMPLAINING OF HEAD SYMPTOM: ICD-10-CM

## 2019-11-08 DIAGNOSIS — F41.9 ANXIETY: ICD-10-CM

## 2019-11-08 DIAGNOSIS — M25.50 ARTHRALGIA, UNSPECIFIED JOINT: ICD-10-CM

## 2019-11-08 PROCEDURE — 70450 CT HEAD/BRAIN W/O DYE: CPT

## 2019-11-08 NOTE — TELEPHONE ENCOUNTER
Normal head CT  If still having symptoms, we can adjust gabapentin or I can refer you to neurology  Let me know

## 2019-11-11 RX ORDER — GABAPENTIN 300 MG/1
600 CAPSULE ORAL 3 TIMES DAILY
Qty: 180 CAPSULE | Refills: 1 | Status: SHIPPED | OUTPATIENT
Start: 2019-11-11 | End: 2020-06-03

## 2019-11-11 NOTE — TELEPHONE ENCOUNTER
I did not adjust your venlafaxine (Effexor)  Are you taking 200 mg 3x a day?   Maximum dose is <400 mg daily

## 2019-11-11 NOTE — TELEPHONE ENCOUNTER
Patient states she takes gabapentin 600 mg tablets  States she takes about 2 tablets daily (0 5 tabs at dinner, 1 tab at bedtime, 0 5 tabs middle of the night every day)  Pharmacy confirmed   Sherry Cornelius

## 2019-11-11 NOTE — TELEPHONE ENCOUNTER
Patient notified  Patient states Dr Isi Deras will not not refill her gabapentin or Ventolin because the doses were increased by PCP  Patient would like to know if PCP can refill both of these meds for her  States she is running all out

## 2019-11-11 NOTE — TELEPHONE ENCOUNTER
Patient notified and states she is still having the same symptoms  She is very concerned  Would like referral for neurology and would like to know if gabapentin can be adjusted till she gets in with them since they schedule out

## 2019-11-13 ENCOUNTER — TELEPHONE (OUTPATIENT)
Dept: INTERNAL MEDICINE CLINIC | Facility: CLINIC | Age: 49
End: 2019-11-13

## 2019-11-13 DIAGNOSIS — R41.0 CONFUSION: Primary | ICD-10-CM

## 2019-11-13 DIAGNOSIS — F41.9 ANXIETY: ICD-10-CM

## 2019-11-13 DIAGNOSIS — M25.50 ARTHRALGIA, UNSPECIFIED JOINT: ICD-10-CM

## 2019-11-13 NOTE — TELEPHONE ENCOUNTER
Pt  Is taking Venlafaxine 300mg tid  He psychiatrist wants her to take 200mg tid  She will take 200mg tid even though it isn't helping her pain  Mirella Sow up on me

## 2019-11-13 NOTE — TELEPHONE ENCOUNTER
Patient states she "philipp PCP told her to increase the venlafaxine " States she is upset about this and has been seeing her psychiatrist longer and goes by the psych doctors dosing instructions  Notified patient of max dose is <400 mg daily  Patient then apologized and states she thought we were discussing the gabapentin  Then explained to patient we were not it is the venlafaxine  Patient then went through bottles to check the how much a day she is taking of the venlafaxine  States she is unsure and will have to give our office a call back  Patient then goes back to saying " I am very upset about this  PCP did increase my venlafaxine  I may end up leaving the practice because of this " Patient then hung up phone

## 2019-11-15 ENCOUNTER — TELEPHONE (OUTPATIENT)
Dept: INTERNAL MEDICINE CLINIC | Facility: CLINIC | Age: 49
End: 2019-11-15

## 2019-11-15 RX ORDER — VENLAFAXINE 100 MG/1
100 TABLET ORAL 3 TIMES DAILY
Qty: 90 TABLET | Refills: 0
Start: 2019-11-15

## 2019-11-15 RX ORDER — VENLAFAXINE HYDROCHLORIDE 75 MG/1
75 TABLET, EXTENDED RELEASE ORAL 3 TIMES DAILY
Qty: 30 TABLET | Refills: 5
Start: 2019-11-15 | End: 2020-06-03 | Stop reason: ALTCHOICE

## 2019-11-15 NOTE — TELEPHONE ENCOUNTER
Staff: please call her pharmacy and clarify the venlafaxine and gabapentin prescription prescribed by Dr Deepak Masterson

## 2019-11-15 NOTE — TELEPHONE ENCOUNTER
Patient called and states she is going to see Dr Cyril Ibarra this afternoon so do not send script over to the pharmacy

## 2019-11-15 NOTE — TELEPHONE ENCOUNTER
Please let patient know (sent message in [employer] also)  I spoke to Dr Shu Wallace to clarify your medications  Please decrease you gabapentin to the way you were taking it (half tablet morning and night, full tablet mid day)  Please continue your venlafaxine at 175 mg 3 times a day  You have an appointment with him on Tuesday, November 19  Please keep that appointment  I would also like to do some blood work and check your urine  I ordered this and you can do it anytime at any [facility]  [facility]

## 2019-11-15 NOTE — TELEPHONE ENCOUNTER
Spoke to Dr Niecy Reynolds regarding patient  Medication doses/frequency clarified  He did not refuse medication refill requests from patient  He expressed concern about increase in gabapentin since she was also on Klonopin  Follow up as scheduled

## 2019-11-15 NOTE — TELEPHONE ENCOUNTER
Patient notified  States since the Venlafaxine is increased she will be running out soon and will need a refill for it  Can send Rx to [id number]

## 2019-11-15 NOTE — TELEPHONE ENCOUNTER
Per the pharmacist this is what they have the patient taking       Gabapentin 300mg 2cap 3x daily    venlafaxine 100mg 1 tab 3x daily

## 2020-01-06 DIAGNOSIS — K21.9 GASTROESOPHAGEAL REFLUX DISEASE, ESOPHAGITIS PRESENCE NOT SPECIFIED: ICD-10-CM

## 2020-01-06 RX ORDER — LANSOPRAZOLE 30 MG/1
CAPSULE, DELAYED RELEASE ORAL
Qty: 90 CAPSULE | Refills: 0 | Status: SHIPPED | OUTPATIENT
Start: 2020-01-06 | End: 2020-04-08

## 2020-02-04 ENCOUNTER — TELEPHONE (OUTPATIENT)
Dept: SLEEP CENTER | Facility: CLINIC | Age: 50
End: 2020-02-04

## 2020-02-04 NOTE — TELEPHONE ENCOUNTER
Received refill request for Xyrem- not seen since October 2018  Left message to call office regarding refill

## 2020-02-13 ENCOUNTER — TELEPHONE (OUTPATIENT)
Dept: SLEEP CENTER | Facility: CLINIC | Age: 50
End: 2020-02-13

## 2020-02-13 NOTE — TELEPHONE ENCOUNTER
Pt was transferred over to the Sleep lab in [place] by Gwen Sung from Dr Peralta office  Pt needed directions for her apt, I explained to the patient her appointment was not for today it was for yesterday and our next available was 2/14/20 at 10 am     Pt then started yelling on the phone stating we needed to remove the no show status from her appointment from 2/12 because "our staff are a bunch of idiots"     I told the patient I can schedule her appointment for 2/14/20 at 10am and give her the appropriate directions , pt then hung up the phone

## 2020-02-14 ENCOUNTER — OFFICE VISIT (OUTPATIENT)
Dept: SLEEP CENTER | Facility: CLINIC | Age: 50
End: 2020-02-14
Payer: COMMERCIAL

## 2020-02-14 VITALS
SYSTOLIC BLOOD PRESSURE: 100 MMHG | DIASTOLIC BLOOD PRESSURE: 68 MMHG | BODY MASS INDEX: 26.99 KG/M2 | HEIGHT: 65 IN | WEIGHT: 162 LBS | HEART RATE: 81 BPM

## 2020-02-14 DIAGNOSIS — G47.411 PRIMARY NARCOLEPSY WITH CATAPLEXY: Primary | ICD-10-CM

## 2020-02-14 PROCEDURE — 1036F TOBACCO NON-USER: CPT | Performed by: INTERNAL MEDICINE

## 2020-02-14 PROCEDURE — 3008F BODY MASS INDEX DOCD: CPT | Performed by: INTERNAL MEDICINE

## 2020-02-14 PROCEDURE — 99214 OFFICE O/P EST MOD 30 MIN: CPT | Performed by: INTERNAL MEDICINE

## 2020-02-14 NOTE — PROGRESS NOTES
Progress Note - Sleep Center   Ritu Early MRN: [id number]      Reason for Visit:    54 y  o female with narcolepsy    Assessment:  The patient seems to be doing well on the current regimen of Xyrem 4 5 g twice nightly  She no longer requires mirtazapine  She has not had any episodes of cataplexy in at least two months  She is complaining of episodes where she feels that the world stops and she experiences shooting pain through her head  Her CT brain was unremarkable  It may be worthwhile considering an EEG to look for partial complex seizure disorder  Plan:  Continue Xyrem  Consider EEG    Follow up:  Yearly    History of Present Illness: The patient has a history of narcolepsy diagnosed on multiple sleep latency testing at [facility]   Her symptoms have been well controlled on an Xyrem 4 5 g twice nightly  She has had no sleep paralysis or cataplexy  She does report what may be hallucination, which occurs occasionally whereby she feels that the world stops and and there is a sharp pain through her head      Review of Systems      Genitourinary none   Cardiology none   Gastrointestinal none   Neurology none   Constitutional excessive sweating at night and weight change   Integumentary none   Psychiatry anxiety and depression   Musculoskeletal joint pain   Pulmonary none   ENT ringing in ears   Endocrine none   Hematological none         I have reviewed and updated the review of systems as necessary     Historical Information    Past Medical History:   Diagnosis Date    Anxiety     Asthma     childhood    Depression     Narcolepsy     Obesity          Past Surgical History:   Procedure Laterality Date     SECTION      CHOLECYSTECTOMY      KNEE ARTHROSCOPY Left          Social History     Socioeconomic History    Marital status: /Civil Union     Spouse name: None    Number of children: None    Years of education: None    Highest education level: None   Occupational History    None   Social Needs    Financial resource strain: None    Food insecurity:     Worry: None     Inability: None    Transportation needs:     Medical: None     Non-medical: None   Tobacco Use    Smoking status: Never Smoker    Smokeless tobacco: Never Used   Substance and Sexual Activity    Alcohol use: No    Drug use: No    Sexual activity: None   Lifestyle    Physical activity:     Days per week: None     Minutes per session: None    Stress: None   Relationships    Social connections:     Talks on phone: None     Gets together: None     Attends Confucianist service: None     Active member of club or organization: None     Attends meetings of clubs or organizations: None     Relationship status: None    Intimate partner violence:     Fear of current or ex partner: None     Emotionally abused: None     Physically abused: None     Forced sexual activity: None   Other Topics Concern    None   Social History Narrative    Drinks ice tea 3-4 per day     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    3 children 19,16 and 15 (adopted)               History   Alcohol use: Not on file       History   Smoking Status    Not on file   Smokeless Tobacco    Not on file       Family History:   Family History   Problem Relation Age of Onset    Hypertension Mother     Depression Mother     Atrial fibrillation Mother     Hypertension Father     Depression Father     Heart failure Father     Diabetes Maternal Grandmother     Stroke Maternal Grandmother     Lung cancer Paternal Grandfather     Brain cancer Maternal Grandfather     Alcohol abuse Neg Hx     Substance Abuse Neg Hx     Mental illness Neg Hx        Medications/Allergies:      Current Outpatient Medications:     Albuterol Sulfate (PROAIR RESPICLICK) 588 (90 Base) MCG/ACT AEPB, Inhale 1 puff every 6 (six) hours as needed (wheezing), Disp: 1 each, Rfl: 1    buPROPion (WELLBUTRIN) 75 mg tablet, 1/4 tab tid, Disp: 30 tablet, Rfl: 0    clonazePAM (KlonoPIN) 0 5 mg tablet, Take 0 5 mg by mouth 4 (four) times a day, Disp: , Rfl:     cyanocobalamin 1,000 mcg/mL, INJECT 1ML IN THE MUSCLE EVERY 30 DAYS, Disp: 10 mL, Rfl: 0    diazepam (VALIUM) 5 mg tablet, Take 1 tablet 1 hour before procedure, may repeat 2 hours later  , Disp: 16 tablet, Rfl: 0    ferrous sulfate 325 (65 Fe) mg tablet, Take 1 tablet (325 mg total) by mouth daily, Disp: 90 tablet, Rfl: 1    gabapentin (NEURONTIN) 300 mg capsule, Take 2 capsules (600 mg total) by mouth 3 (three) times a day, Disp: 180 capsule, Rfl: 1    lansoprazole (PREVACID) 30 mg capsule, TAKE 1 CAPSULE(30 MG) BY MOUTH DAILY, Disp: 90 capsule, Rfl: 0    Sodium Oxybate (XYREM) 500 MG/ML SOLN, First dose (bedtime): 4 5 g + Second dose (2 5-4 hrs later): 4 5 g = 9 g Total Nightly Dose [Total Qty: 1 month(s)], Disp: 270 g, Rfl: 5    SYRINGE-NEEDLE, DISP, 3 ML 25G X 1" 3 ML MISC, by Does not apply route every 30 (thirty) days, Disp: 6 each, Rfl: 1    venlafaxine (EFFEXOR) 100 MG tablet, Take 1 tablet (100 mg total) by mouth 3 (three) times a day, Disp: 90 tablet, Rfl: 0    venlafaxine 75 mg 24 hr tablet, Take 1 tablet (75 mg total) by mouth 3 (three) times a day, Disp: 30 tablet, Rfl: 5      Objective    Vital Signs:   Vitals:    02/14/20 1022   BP: 100/68   Pulse: 81   Weight: 73 5 kg (162 lb)   Height: 5' 5" (1 651 m)     [place] Sleepiness Scale: Total score: 14    Physical Exam:    General: Alert, appropriate, cooperative, overweight    Head: NC/AT    Skin: Warm, dry    Neuro: No motor abnormalities, cranial nerves appear intact    Psych: Normal affect            PARUL Davis Arm    Board Certified Sleep Specialist

## 2020-02-18 ENCOUNTER — TELEPHONE (OUTPATIENT)
Dept: INTERNAL MEDICINE CLINIC | Facility: CLINIC | Age: 50
End: 2020-02-18

## 2020-02-18 NOTE — TELEPHONE ENCOUNTER
Regarding: RE: RE: Prescription Question  ----- Message from Kristy Salcedo sent at 2/14/2020 12:27 PM EST -----       ----- Message from Andre Mejias to Lia Wyatt MD sent at 2/14/2020 11:56 AM -----   I am doing fine  I really was extremely insulted to be treated as though I was drug-seeking  I have my blood checked regularly and it was very disturbing to be treated in such away  Also, you did tell me to go up on my Va medication at one point that was being questioned  It was never my [place] so? I try very hard to follow my med  Schedule  If I continue with the practice which I loved! What are the chances of dealing with a nurse practitioner? She was very rude and belittling to me  It was truly a bunch of people doing what they said this then leaving me to defend myself  To have to prove yourself is not pleasant and then everyone putting their nose into it was even a bigger problem  I value an aggressive treatment but not over treatment by any means  I was very saddened that I had to really push Ragini Albert to go to the ER when he was told it was just a bit of inflammation left  No cough medicine or anything to help with the discomfort he was feeling and the  respiratory distress he was having after he was told that he had reached a point of letting run, its course  Raigni Albert, is a huge fear of mine he is older than me, works hard, and has a heart condition  I don't expect much of an answer back just wanted to explain where I am at, I know it is time for physical  I first need to decide the best course of action and know that the staff who treated me in that fashion have no access to my chart  I also didn't understand why would an increase in medication not be in your Dr notes? Because you did say it I remember our conversation well  I really liked the practice but, will never be put in that situation again     No medication and trying to convenience 3 people, that I am not a drug addict is extremely unpleasant  Thank you,  Meme Selby       ----- Message -----  From: Tami Tejeda MD  Sent: 11/15/19 11:54 AM  To: Enedina Delgado  Subject: RE: Prescription Question    Chace Ortez,    I spoke to Dr Dina Frances to clarify your medications  Please decrease you gabapentin to the way you were taking it (half tablet morning and night, full tablet mid day), total of 2 tablets daily  Please continue your venlafaxine at 175 mg 3 times a day  You have an appointment with him on Tuesday, November 19  Please keep that appointment  I would also like to do some blood work and check your urine  I ordered this and you can do it anytime at any [facility] lab  If you have any questions, please call the office     -Dr Mae Marcelino      ----- Message -----     From: Enedina Delgado     Sent: 11/14/2019  3:54 PM EST       To: Tami Tejeda MD  Subject: Prescription Question    Please look at the earlier message ASAP  I need a plan for medication

## 2020-03-09 DIAGNOSIS — G47.411 NARCOLEPSY AND CATAPLEXY: ICD-10-CM

## 2020-04-08 DIAGNOSIS — K21.9 GASTROESOPHAGEAL REFLUX DISEASE, ESOPHAGITIS PRESENCE NOT SPECIFIED: ICD-10-CM

## 2020-04-08 RX ORDER — LANSOPRAZOLE 30 MG/1
CAPSULE, DELAYED RELEASE ORAL
Qty: 90 CAPSULE | Refills: 0 | Status: SHIPPED | OUTPATIENT
Start: 2020-04-08 | End: 2020-06-29

## 2020-04-16 ENCOUNTER — TELEPHONE (OUTPATIENT)
Dept: INTERNAL MEDICINE CLINIC | Facility: CLINIC | Age: 50
End: 2020-04-16

## 2020-04-19 ENCOUNTER — TELEPHONE (OUTPATIENT)
Dept: OTHER | Facility: OTHER | Age: 50
End: 2020-04-19

## 2020-05-29 ENCOUNTER — TELEPHONE (OUTPATIENT)
Dept: INTERNAL MEDICINE CLINIC | Facility: CLINIC | Age: 50
End: 2020-05-29

## 2020-06-03 ENCOUNTER — TELEMEDICINE (OUTPATIENT)
Dept: INTERNAL MEDICINE CLINIC | Facility: CLINIC | Age: 50
End: 2020-06-03
Payer: COMMERCIAL

## 2020-06-03 DIAGNOSIS — D50.8 OTHER IRON DEFICIENCY ANEMIA: ICD-10-CM

## 2020-06-03 DIAGNOSIS — M25.50 ARTHRALGIA, UNSPECIFIED JOINT: ICD-10-CM

## 2020-06-03 DIAGNOSIS — R73.03 PREDIABETES: ICD-10-CM

## 2020-06-03 DIAGNOSIS — J01.00 ACUTE NON-RECURRENT MAXILLARY SINUSITIS: Primary | ICD-10-CM

## 2020-06-03 DIAGNOSIS — F32.A DEPRESSION, UNSPECIFIED DEPRESSION TYPE: ICD-10-CM

## 2020-06-03 DIAGNOSIS — E53.8 VITAMIN B12 DEFICIENCY: ICD-10-CM

## 2020-06-03 DIAGNOSIS — Z98.84 S/P GASTRIC BYPASS: ICD-10-CM

## 2020-06-03 DIAGNOSIS — G47.411 PRIMARY NARCOLEPSY WITH CATAPLEXY: ICD-10-CM

## 2020-06-03 DIAGNOSIS — E66.3 OVERWEIGHT: ICD-10-CM

## 2020-06-03 DIAGNOSIS — E55.9 VITAMIN D DEFICIENCY: ICD-10-CM

## 2020-06-03 DIAGNOSIS — K21.9 GASTROESOPHAGEAL REFLUX DISEASE, ESOPHAGITIS PRESENCE NOT SPECIFIED: ICD-10-CM

## 2020-06-03 DIAGNOSIS — J45.20 MILD INTERMITTENT ASTHMA WITHOUT COMPLICATION: ICD-10-CM

## 2020-06-03 PROBLEM — M77.10 LATERAL EPICONDYLITIS: Status: RESOLVED | Noted: 2018-07-09 | Resolved: 2020-06-03

## 2020-06-03 PROBLEM — N92.1 MENOMETRORRHAGIA: Status: RESOLVED | Noted: 2018-09-27 | Resolved: 2020-06-03

## 2020-06-03 PROBLEM — M77.00 MEDIAL EPICONDYLITIS: Status: RESOLVED | Noted: 2018-07-09 | Resolved: 2020-06-03

## 2020-06-03 PROCEDURE — 99214 OFFICE O/P EST MOD 30 MIN: CPT | Performed by: INTERNAL MEDICINE

## 2020-06-03 RX ORDER — GABAPENTIN 300 MG/1
CAPSULE ORAL
Qty: 180 CAPSULE | Refills: 1
Start: 2020-06-03 | End: 2020-12-22

## 2020-06-03 RX ORDER — OLANZAPINE 2.5 MG/1
2.5 TABLET ORAL
Qty: 90 TABLET | Refills: 0
Start: 2020-06-03 | End: 2020-12-22

## 2020-06-03 RX ORDER — AMOXICILLIN AND CLAVULANATE POTASSIUM 875; 125 MG/1; MG/1
1 TABLET, FILM COATED ORAL EVERY 12 HOURS SCHEDULED
Qty: 14 TABLET | Refills: 0 | Status: SHIPPED | OUTPATIENT
Start: 2020-06-03 | End: 2020-06-10

## 2020-06-03 RX ORDER — CLONAZEPAM 2 MG/1
TABLET ORAL
Qty: 30 TABLET | Refills: 0
Start: 2020-06-03 | End: 2020-12-22

## 2020-06-17 ENCOUNTER — APPOINTMENT (OUTPATIENT)
Dept: LAB | Facility: CLINIC | Age: 50
End: 2020-06-17
Payer: COMMERCIAL

## 2020-06-17 LAB
25(OH)D3 SERPL-MCNC: 21.9 NG/ML (ref 30–100)
ALBUMIN SERPL BCP-MCNC: 2.8 G/DL (ref 3.5–5)
ALP SERPL-CCNC: 94 U/L (ref 46–116)
ALT SERPL W P-5'-P-CCNC: 31 U/L (ref 12–78)
ANION GAP SERPL CALCULATED.3IONS-SCNC: 5 MMOL/L (ref 4–13)
AST SERPL W P-5'-P-CCNC: 22 U/L (ref 5–45)
BASOPHILS # BLD AUTO: 0.03 THOUSANDS/ΜL (ref 0–0.1)
BASOPHILS NFR BLD AUTO: 1 % (ref 0–1)
BILIRUB SERPL-MCNC: 0.11 MG/DL (ref 0.2–1)
BUN SERPL-MCNC: 14 MG/DL (ref 5–25)
CALCIUM SERPL-MCNC: 8.7 MG/DL (ref 8.3–10.1)
CHLORIDE SERPL-SCNC: 109 MMOL/L (ref 100–108)
CHOLEST SERPL-MCNC: 196 MG/DL (ref 50–200)
CO2 SERPL-SCNC: 31 MMOL/L (ref 21–32)
CREAT SERPL-MCNC: 0.62 MG/DL (ref 0.6–1.3)
EOSINOPHIL # BLD AUTO: 0.07 THOUSAND/ΜL (ref 0–0.61)
EOSINOPHIL NFR BLD AUTO: 2 % (ref 0–6)
ERYTHROCYTE [DISTWIDTH] IN BLOOD BY AUTOMATED COUNT: 15.1 % (ref 11.6–15.1)
EST. AVERAGE GLUCOSE BLD GHB EST-MCNC: 103 MG/DL
FERRITIN SERPL-MCNC: 48 NG/ML (ref 8–388)
FOLATE SERPL-MCNC: >20 NG/ML (ref 3.1–17.5)
GFR SERPL CREATININE-BSD FRML MDRD: 107 ML/MIN/1.73SQ M
GLUCOSE SERPL-MCNC: 85 MG/DL (ref 65–140)
HBA1C MFR BLD: 5.2 %
HCT VFR BLD AUTO: 37 % (ref 34.8–46.1)
HDLC SERPL-MCNC: 63 MG/DL
HGB BLD-MCNC: 11.8 G/DL (ref 11.5–15.4)
IMM GRANULOCYTES # BLD AUTO: 0.01 THOUSAND/UL (ref 0–0.2)
IMM GRANULOCYTES NFR BLD AUTO: 0 % (ref 0–2)
IRON SERPL-MCNC: 62 UG/DL (ref 50–170)
LDLC SERPL CALC-MCNC: 120 MG/DL (ref 0–100)
LYMPHOCYTES # BLD AUTO: 1.73 THOUSANDS/ΜL (ref 0.6–4.47)
LYMPHOCYTES NFR BLD AUTO: 38 % (ref 14–44)
MCH RBC QN AUTO: 28.6 PG (ref 26.8–34.3)
MCHC RBC AUTO-ENTMCNC: 31.9 G/DL (ref 31.4–37.4)
MCV RBC AUTO: 90 FL (ref 82–98)
MONOCYTES # BLD AUTO: 0.31 THOUSAND/ΜL (ref 0.17–1.22)
MONOCYTES NFR BLD AUTO: 7 % (ref 4–12)
NEUTROPHILS # BLD AUTO: 2.45 THOUSANDS/ΜL (ref 1.85–7.62)
NEUTS SEG NFR BLD AUTO: 52 % (ref 43–75)
NONHDLC SERPL-MCNC: 133 MG/DL
NRBC BLD AUTO-RTO: 0 /100 WBCS
PLATELET # BLD AUTO: 263 THOUSANDS/UL (ref 149–390)
PMV BLD AUTO: 11.4 FL (ref 8.9–12.7)
POTASSIUM SERPL-SCNC: 3.8 MMOL/L (ref 3.5–5.3)
PROT SERPL-MCNC: 6.1 G/DL (ref 6.4–8.2)
RBC # BLD AUTO: 4.13 MILLION/UL (ref 3.81–5.12)
SODIUM SERPL-SCNC: 145 MMOL/L (ref 136–145)
TIBC SERPL-MCNC: 240 UG/DL (ref 250–450)
TRIGL SERPL-MCNC: 67 MG/DL
TSH SERPL DL<=0.05 MIU/L-ACNC: 1.11 UIU/ML (ref 0.36–3.74)
VIT B12 SERPL-MCNC: 1311 PG/ML (ref 100–900)
WBC # BLD AUTO: 4.6 THOUSAND/UL (ref 4.31–10.16)

## 2020-06-17 PROCEDURE — 83036 HEMOGLOBIN GLYCOSYLATED A1C: CPT | Performed by: INTERNAL MEDICINE

## 2020-06-17 PROCEDURE — 83540 ASSAY OF IRON: CPT | Performed by: INTERNAL MEDICINE

## 2020-06-17 PROCEDURE — 82306 VITAMIN D 25 HYDROXY: CPT | Performed by: INTERNAL MEDICINE

## 2020-06-17 PROCEDURE — 84590 ASSAY OF VITAMIN A: CPT | Performed by: INTERNAL MEDICINE

## 2020-06-17 PROCEDURE — 82728 ASSAY OF FERRITIN: CPT | Performed by: INTERNAL MEDICINE

## 2020-06-17 PROCEDURE — 82607 VITAMIN B-12: CPT | Performed by: INTERNAL MEDICINE

## 2020-06-17 PROCEDURE — 84443 ASSAY THYROID STIM HORMONE: CPT | Performed by: INTERNAL MEDICINE

## 2020-06-17 PROCEDURE — 83550 IRON BINDING TEST: CPT | Performed by: INTERNAL MEDICINE

## 2020-06-17 PROCEDURE — 85025 COMPLETE CBC W/AUTO DIFF WBC: CPT | Performed by: INTERNAL MEDICINE

## 2020-06-17 PROCEDURE — 36415 COLL VENOUS BLD VENIPUNCTURE: CPT | Performed by: INTERNAL MEDICINE

## 2020-06-17 PROCEDURE — 82746 ASSAY OF FOLIC ACID SERUM: CPT | Performed by: INTERNAL MEDICINE

## 2020-06-17 PROCEDURE — 80061 LIPID PANEL: CPT | Performed by: INTERNAL MEDICINE

## 2020-06-17 PROCEDURE — 80053 COMPREHEN METABOLIC PANEL: CPT | Performed by: INTERNAL MEDICINE

## 2020-06-20 LAB — VIT A SERPL-MCNC: 11 UG/DL (ref 20.1–62)

## 2020-06-29 DIAGNOSIS — K21.9 GASTROESOPHAGEAL REFLUX DISEASE, ESOPHAGITIS PRESENCE NOT SPECIFIED: ICD-10-CM

## 2020-06-29 RX ORDER — LANSOPRAZOLE 30 MG/1
CAPSULE, DELAYED RELEASE ORAL
Qty: 90 CAPSULE | Refills: 0 | Status: SHIPPED | OUTPATIENT
Start: 2020-06-29 | End: 2020-12-01 | Stop reason: SDUPTHER

## 2020-07-28 DIAGNOSIS — G47.411 NARCOLEPSY AND CATAPLEXY: ICD-10-CM

## 2020-12-01 DIAGNOSIS — K21.9 GASTROESOPHAGEAL REFLUX DISEASE: ICD-10-CM

## 2020-12-01 RX ORDER — LANSOPRAZOLE 30 MG/1
30 CAPSULE, DELAYED RELEASE ORAL DAILY
Qty: 90 CAPSULE | Refills: 0 | Status: SHIPPED | OUTPATIENT
Start: 2020-12-01 | End: 2021-06-01 | Stop reason: SDUPTHER

## 2020-12-22 ENCOUNTER — OFFICE VISIT (OUTPATIENT)
Dept: FAMILY MEDICINE CLINIC | Facility: CLINIC | Age: 50
End: 2020-12-22
Payer: COMMERCIAL

## 2020-12-22 VITALS
OXYGEN SATURATION: 98 % | SYSTOLIC BLOOD PRESSURE: 130 MMHG | RESPIRATION RATE: 20 BRPM | WEIGHT: 225 LBS | BODY MASS INDEX: 38.41 KG/M2 | TEMPERATURE: 98.5 F | HEIGHT: 64 IN | DIASTOLIC BLOOD PRESSURE: 78 MMHG | HEART RATE: 109 BPM

## 2020-12-22 DIAGNOSIS — Z98.84 S/P GASTRIC BYPASS: ICD-10-CM

## 2020-12-22 DIAGNOSIS — E53.8 B12 DEFICIENCY: ICD-10-CM

## 2020-12-22 DIAGNOSIS — Z23 NEED FOR VACCINATION: Primary | ICD-10-CM

## 2020-12-22 DIAGNOSIS — F41.9 ANXIETY: ICD-10-CM

## 2020-12-22 DIAGNOSIS — E66.01 CLASS 2 SEVERE OBESITY DUE TO EXCESS CALORIES WITH SERIOUS COMORBIDITY AND BODY MASS INDEX (BMI) OF 38.0 TO 38.9 IN ADULT (HCC): ICD-10-CM

## 2020-12-22 DIAGNOSIS — E78.00 HYPERCHOLESTEREMIA: ICD-10-CM

## 2020-12-22 DIAGNOSIS — M25.50 ARTHRALGIA, UNSPECIFIED JOINT: ICD-10-CM

## 2020-12-22 DIAGNOSIS — E61.1 IRON DEFICIENCY: ICD-10-CM

## 2020-12-22 DIAGNOSIS — E55.9 VITAMIN D DEFICIENCY: ICD-10-CM

## 2020-12-22 DIAGNOSIS — R35.0 URINARY FREQUENCY: ICD-10-CM

## 2020-12-22 DIAGNOSIS — J45.20 MILD INTERMITTENT ASTHMA WITHOUT COMPLICATION: ICD-10-CM

## 2020-12-22 DIAGNOSIS — G47.411 NARCOLEPSY AND CATAPLEXY: ICD-10-CM

## 2020-12-22 LAB
BACTERIA UR QL AUTO: NORMAL /HPF
BILIRUB UR QL STRIP: NEGATIVE
CLARITY UR: CLEAR
COLOR UR: YELLOW
GLUCOSE UR STRIP-MCNC: NEGATIVE MG/DL
HGB UR QL STRIP.AUTO: NEGATIVE
HYALINE CASTS #/AREA URNS LPF: NORMAL /LPF
KETONES UR STRIP-MCNC: NEGATIVE MG/DL
LEUKOCYTE ESTERASE UR QL STRIP: NEGATIVE
NITRITE UR QL STRIP: NEGATIVE
NON-SQ EPI CELLS URNS QL MICRO: NORMAL /HPF
PH UR STRIP.AUTO: 7 [PH]
PROT UR STRIP-MCNC: NEGATIVE MG/DL
RBC #/AREA URNS AUTO: NORMAL /HPF
SL AMB  POCT GLUCOSE, UA: NEGATIVE
SL AMB LEUKOCYTE ESTERASE,UA: NEGATIVE
SL AMB POCT BILIRUBIN,UA: NEGATIVE
SL AMB POCT BLOOD,UA: NEGATIVE
SL AMB POCT CLARITY,UA: CLEAR
SL AMB POCT COLOR,UA: YELLOW
SL AMB POCT KETONES,UA: NEGATIVE
SL AMB POCT NITRITE,UA: NEGATIVE
SL AMB POCT PH,UA: 6.5
SL AMB POCT SPECIFIC GRAVITY,UA: 1.01
SL AMB POCT URINE PROTEIN: NEGATIVE
SL AMB POCT UROBILINOGEN: 3.5
SP GR UR STRIP.AUTO: 1.01 (ref 1–1.03)
UROBILINOGEN UR QL STRIP.AUTO: 0.2 E.U./DL
WBC #/AREA URNS AUTO: NORMAL /HPF

## 2020-12-22 PROCEDURE — 81003 URINALYSIS AUTO W/O SCOPE: CPT | Performed by: FAMILY MEDICINE

## 2020-12-22 PROCEDURE — 90472 IMMUNIZATION ADMIN EACH ADD: CPT | Performed by: FAMILY MEDICINE

## 2020-12-22 PROCEDURE — 3008F BODY MASS INDEX DOCD: CPT | Performed by: FAMILY MEDICINE

## 2020-12-22 PROCEDURE — 90715 TDAP VACCINE 7 YRS/> IM: CPT | Performed by: FAMILY MEDICINE

## 2020-12-22 PROCEDURE — 3725F SCREEN DEPRESSION PERFORMED: CPT | Performed by: FAMILY MEDICINE

## 2020-12-22 PROCEDURE — 87086 URINE CULTURE/COLONY COUNT: CPT | Performed by: FAMILY MEDICINE

## 2020-12-22 PROCEDURE — 1036F TOBACCO NON-USER: CPT | Performed by: FAMILY MEDICINE

## 2020-12-22 PROCEDURE — 90732 PPSV23 VACC 2 YRS+ SUBQ/IM: CPT | Performed by: FAMILY MEDICINE

## 2020-12-22 PROCEDURE — 99203 OFFICE O/P NEW LOW 30 MIN: CPT | Performed by: FAMILY MEDICINE

## 2020-12-22 PROCEDURE — 90471 IMMUNIZATION ADMIN: CPT | Performed by: FAMILY MEDICINE

## 2020-12-22 PROCEDURE — 81001 URINALYSIS AUTO W/SCOPE: CPT | Performed by: FAMILY MEDICINE

## 2020-12-22 RX ORDER — LOXAPINE SUCCINATE 10 MG/1
TABLET ORAL
Start: 2020-12-22

## 2020-12-22 RX ORDER — SODIUM OXYBATE 0.5 G/ML
SOLUTION ORAL
Qty: 270 G | Refills: 5
Start: 2020-12-22 | End: 2021-01-07 | Stop reason: SDUPTHER

## 2020-12-22 RX ORDER — ALBUTEROL SULFATE 90 UG/1
1 POWDER, METERED RESPIRATORY (INHALATION) EVERY 4 HOURS PRN
Qty: 1 EACH | Refills: 1
Start: 2020-12-22

## 2020-12-22 RX ORDER — GABAPENTIN 300 MG/1
CAPSULE ORAL
Qty: 180 CAPSULE | Refills: 1
Start: 2020-12-22 | End: 2022-05-09

## 2020-12-22 RX ORDER — CLONAZEPAM 2 MG/1
2 TABLET ORAL 2 TIMES DAILY
Refills: 0
Start: 2020-12-22 | End: 2022-05-09

## 2020-12-22 RX ORDER — CLONAZEPAM 1 MG/1
TABLET ORAL
Refills: 0
Start: 2020-12-22 | End: 2021-10-25

## 2020-12-23 PROBLEM — R68.89 COMPLAINING OF HEAD SYMPTOM: Status: RESOLVED | Noted: 2019-10-02 | Resolved: 2020-12-23

## 2020-12-23 PROBLEM — F33.3 SEVERE EPISODE OF RECURRENT MAJOR DEPRESSIVE DISORDER, WITH PSYCHOTIC FEATURES (HCC): Status: ACTIVE | Noted: 2019-10-02

## 2020-12-23 PROBLEM — E66.01 CLASS 2 SEVERE OBESITY DUE TO EXCESS CALORIES WITH SERIOUS COMORBIDITY AND BODY MASS INDEX (BMI) OF 38.0 TO 38.9 IN ADULT (HCC): Status: ACTIVE | Noted: 2020-12-23

## 2020-12-23 PROBLEM — E66.3 OVERWEIGHT: Status: RESOLVED | Noted: 2018-09-27 | Resolved: 2020-12-23

## 2020-12-23 PROBLEM — R42 VERTIGO: Status: RESOLVED | Noted: 2018-09-27 | Resolved: 2020-12-23

## 2020-12-23 PROBLEM — E66.812 CLASS 2 SEVERE OBESITY DUE TO EXCESS CALORIES WITH SERIOUS COMORBIDITY AND BODY MASS INDEX (BMI) OF 38.0 TO 38.9 IN ADULT (HCC): Status: ACTIVE | Noted: 2020-12-23

## 2020-12-23 LAB — BACTERIA UR CULT: NORMAL

## 2020-12-30 ENCOUNTER — LAB (OUTPATIENT)
Dept: LAB | Age: 50
End: 2020-12-30
Payer: COMMERCIAL

## 2020-12-30 DIAGNOSIS — Z98.84 S/P GASTRIC BYPASS: ICD-10-CM

## 2020-12-30 LAB
25(OH)D3 SERPL-MCNC: 7.1 NG/ML (ref 30–100)
ALBUMIN SERPL BCP-MCNC: 3.1 G/DL (ref 3.5–5)
ALP SERPL-CCNC: 121 U/L (ref 46–116)
ALT SERPL W P-5'-P-CCNC: 39 U/L (ref 12–78)
ANION GAP SERPL CALCULATED.3IONS-SCNC: 3 MMOL/L (ref 4–13)
AST SERPL W P-5'-P-CCNC: 27 U/L (ref 5–45)
BASOPHILS # BLD AUTO: 0.07 THOUSANDS/ΜL (ref 0–0.1)
BASOPHILS NFR BLD AUTO: 1 % (ref 0–1)
BILIRUB SERPL-MCNC: 0.36 MG/DL (ref 0.2–1)
BUN SERPL-MCNC: 13 MG/DL (ref 5–25)
CALCIUM ALBUM COR SERPL-MCNC: 10 MG/DL (ref 8.3–10.1)
CALCIUM SERPL-MCNC: 9.3 MG/DL (ref 8.3–10.1)
CHLORIDE SERPL-SCNC: 108 MMOL/L (ref 100–108)
CHOLEST SERPL-MCNC: 231 MG/DL (ref 50–200)
CO2 SERPL-SCNC: 29 MMOL/L (ref 21–32)
CREAT SERPL-MCNC: 0.61 MG/DL (ref 0.6–1.3)
EOSINOPHIL # BLD AUTO: 0.17 THOUSAND/ΜL (ref 0–0.61)
EOSINOPHIL NFR BLD AUTO: 2 % (ref 0–6)
ERYTHROCYTE [DISTWIDTH] IN BLOOD BY AUTOMATED COUNT: 14 % (ref 11.6–15.1)
FERRITIN SERPL-MCNC: 14 NG/ML (ref 8–388)
FOLATE SERPL-MCNC: 13.1 NG/ML (ref 3.1–17.5)
GFR SERPL CREATININE-BSD FRML MDRD: 108 ML/MIN/1.73SQ M
GLUCOSE P FAST SERPL-MCNC: 91 MG/DL (ref 65–99)
HCT VFR BLD AUTO: 44.2 % (ref 34.8–46.1)
HDLC SERPL-MCNC: 78 MG/DL
HGB BLD-MCNC: 13.8 G/DL (ref 11.5–15.4)
IMM GRANULOCYTES # BLD AUTO: 0.01 THOUSAND/UL (ref 0–0.2)
IMM GRANULOCYTES NFR BLD AUTO: 0 % (ref 0–2)
IRON SATN MFR SERPL: 25 %
IRON SERPL-MCNC: 99 UG/DL (ref 50–170)
LDLC SERPL CALC-MCNC: 136 MG/DL (ref 0–100)
LYMPHOCYTES # BLD AUTO: 1.72 THOUSANDS/ΜL (ref 0.6–4.47)
LYMPHOCYTES NFR BLD AUTO: 24 % (ref 14–44)
MCH RBC QN AUTO: 28.9 PG (ref 26.8–34.3)
MCHC RBC AUTO-ENTMCNC: 31.2 G/DL (ref 31.4–37.4)
MCV RBC AUTO: 93 FL (ref 82–98)
MONOCYTES # BLD AUTO: 0.48 THOUSAND/ΜL (ref 0.17–1.22)
MONOCYTES NFR BLD AUTO: 7 % (ref 4–12)
NEUTROPHILS # BLD AUTO: 4.64 THOUSANDS/ΜL (ref 1.85–7.62)
NEUTS SEG NFR BLD AUTO: 66 % (ref 43–75)
NONHDLC SERPL-MCNC: 153 MG/DL
NRBC BLD AUTO-RTO: 0 /100 WBCS
PLATELET # BLD AUTO: 272 THOUSANDS/UL (ref 149–390)
PMV BLD AUTO: 10.3 FL (ref 8.9–12.7)
POTASSIUM SERPL-SCNC: 4.4 MMOL/L (ref 3.5–5.3)
PREALB SERPL-MCNC: 23.3 MG/DL (ref 18–40)
PROT SERPL-MCNC: 7 G/DL (ref 6.4–8.2)
PTH-INTACT SERPL-MCNC: 127.5 PG/ML (ref 18.4–80.1)
RBC # BLD AUTO: 4.78 MILLION/UL (ref 3.81–5.12)
SODIUM SERPL-SCNC: 140 MMOL/L (ref 136–145)
TIBC SERPL-MCNC: 401 UG/DL (ref 250–450)
TRIGL SERPL-MCNC: 86 MG/DL
TSH SERPL DL<=0.05 MIU/L-ACNC: 2.31 UIU/ML (ref 0.36–3.74)
VIT B12 SERPL-MCNC: 281 PG/ML (ref 100–900)
WBC # BLD AUTO: 7.09 THOUSAND/UL (ref 4.31–10.16)

## 2020-12-30 PROCEDURE — 83550 IRON BINDING TEST: CPT

## 2020-12-30 PROCEDURE — 82728 ASSAY OF FERRITIN: CPT

## 2020-12-30 PROCEDURE — 83540 ASSAY OF IRON: CPT

## 2020-12-30 PROCEDURE — 36415 COLL VENOUS BLD VENIPUNCTURE: CPT

## 2020-12-30 PROCEDURE — 82607 VITAMIN B-12: CPT

## 2020-12-30 PROCEDURE — 83970 ASSAY OF PARATHORMONE: CPT

## 2020-12-30 PROCEDURE — 80061 LIPID PANEL: CPT

## 2020-12-30 PROCEDURE — 84590 ASSAY OF VITAMIN A: CPT

## 2020-12-30 PROCEDURE — 85025 COMPLETE CBC W/AUTO DIFF WBC: CPT

## 2020-12-30 PROCEDURE — 82746 ASSAY OF FOLIC ACID SERUM: CPT

## 2020-12-30 PROCEDURE — 84443 ASSAY THYROID STIM HORMONE: CPT

## 2020-12-30 PROCEDURE — 82306 VITAMIN D 25 HYDROXY: CPT

## 2020-12-30 PROCEDURE — 80053 COMPREHEN METABOLIC PANEL: CPT

## 2020-12-30 PROCEDURE — 84134 ASSAY OF PREALBUMIN: CPT

## 2021-01-02 RX ORDER — CYANOCOBALAMIN 1000 UG/ML
INJECTION INTRAMUSCULAR; SUBCUTANEOUS
Qty: 1 ML | Refills: 11 | Status: SHIPPED | OUTPATIENT
Start: 2021-01-02 | End: 2021-04-06 | Stop reason: SDUPTHER

## 2021-01-02 RX ORDER — NEEDLES, SAFETY 18GX1 1/2"
NEEDLE, DISPOSABLE MISCELLANEOUS DAILY
Qty: 30 EACH | Refills: 1 | Status: SHIPPED | OUTPATIENT
Start: 2021-01-02

## 2021-01-02 RX ORDER — ERGOCALCIFEROL 1.25 MG/1
50000 CAPSULE ORAL WEEKLY
Qty: 12 CAPSULE | Refills: 0 | Status: SHIPPED | OUTPATIENT
Start: 2021-01-02 | End: 2021-04-06

## 2021-01-02 NOTE — RESULT ENCOUNTER NOTE
Call pt due to number of abnormal tests  -   Will need to start B12 injections - she can do these at home  Q month x 3 months then every 3 months  Cholesterol is high - work on diet and exercise  Offer dietician referral    Ferritin low - increase to twice a day  Vitamin D very low at 7 - will start 50,000 iu weekly and will need to repeat blood work   Schedule visit with me in 3 months after fasting blood work

## 2021-01-05 ENCOUNTER — TELEPHONE (OUTPATIENT)
Dept: SLEEP CENTER | Facility: CLINIC | Age: 51
End: 2021-01-05
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
## 2021-01-06 LAB — VIT A SERPL-MCNC: 44.7 UG/DL (ref 20.1–62)

## 2021-01-07 DIAGNOSIS — G47.419 PRIMARY NARCOLEPSY WITHOUT CATAPLEXY: Primary | ICD-10-CM

## 2021-01-07 DIAGNOSIS — G47.411 NARCOLEPSY AND CATAPLEXY: ICD-10-CM

## 2021-01-07 NOTE — TELEPHONE ENCOUNTER
Received auto fax refill request for Xyrem from [facility] pharmacy  Dr Yaritza Ortega, please sign Rx if appropriate  Send to [facility] pharmacy

## 2021-01-10 RX ORDER — SODIUM OXYBATE 0.5 G/ML
SOLUTION ORAL
Qty: 270 G | Refills: 5
Start: 2021-01-10 | End: 2021-01-12 | Stop reason: SDUPTHER

## 2021-01-12 NOTE — TELEPHONE ENCOUNTER
Dr Veronika Barber, I don't see that script was sent to [facility] pharmacy    Please review and sign script if agreeable

## 2021-01-13 RX ORDER — SODIUM OXYBATE 0.5 G/ML
SOLUTION ORAL
Qty: 270 G | Refills: 5 | Status: SHIPPED | OUTPATIENT
Start: 2021-01-13 | End: 2021-06-15 | Stop reason: SDUPTHER

## 2021-01-25 ENCOUNTER — TELEPHONE (OUTPATIENT)
Dept: FAMILY MEDICINE CLINIC | Facility: CLINIC | Age: 51
End: 2021-01-25

## 2021-01-25 DIAGNOSIS — R05.9 COUGH: ICD-10-CM

## 2021-01-25 DIAGNOSIS — Z20.822 EXPOSURE TO COVID-19 VIRUS: ICD-10-CM

## 2021-01-25 DIAGNOSIS — R53.83 OTHER FATIGUE: ICD-10-CM

## 2021-01-25 DIAGNOSIS — Z20.822 EXPOSURE TO COVID-19 VIRUS: Primary | ICD-10-CM

## 2021-01-25 PROCEDURE — U0005 INFEC AGEN DETEC AMPLI PROBE: HCPCS | Performed by: FAMILY MEDICINE

## 2021-01-25 PROCEDURE — U0003 INFECTIOUS AGENT DETECTION BY NUCLEIC ACID (DNA OR RNA); SEVERE ACUTE RESPIRATORY SYNDROME CORONAVIRUS 2 (SARS-COV-2) (CORONAVIRUS DISEASE [COVID-19]), AMPLIFIED PROBE TECHNIQUE, MAKING USE OF HIGH THROUGHPUT TECHNOLOGIES AS DESCRIBED BY CMS-2020-01-R: HCPCS | Performed by: FAMILY MEDICINE

## 2021-01-26 LAB — SARS-COV-2 RNA RESP QL NAA+PROBE: NEGATIVE

## 2021-02-16 ENCOUNTER — TELEMEDICINE (OUTPATIENT)
Dept: SLEEP CENTER | Facility: CLINIC | Age: 51
End: 2021-02-16
Payer: COMMERCIAL

## 2021-02-16 DIAGNOSIS — G47.411 PRIMARY NARCOLEPSY WITH CATAPLEXY: Primary | ICD-10-CM

## 2021-02-16 PROCEDURE — 99213 OFFICE O/P EST LOW 20 MIN: CPT | Performed by: INTERNAL MEDICINE

## 2021-02-16 NOTE — PROGRESS NOTES
[facility] [phone]          Virtual Regular Visit      Assessment/Plan:  The patient is doing well on Xyrem alone  She has had no cataplexy or serious problem with drowsiness since her last visit  Problem List Items Addressed This Visit     None               Reason for visit is   Chief Complaint   Patient presents with    Virtual Regular Visit        Encounter provider Wilda Spatz, MD    Provider located at [facility] [address]  [address] [facility] [address] [place] [address] [phone]      Recent Visits  No visits were found meeting these conditions  Showing recent visits within past 7 days and meeting all other requirements     Today's Visits  Date Type Provider Dept   02/16/21 Telemedicine Wilda Spatz, MD  [facility] today's visits and meeting all other requirements     Future Appointments  No visits were found meeting these conditions  Showing future appointments within next 150 days and meeting all other requirements        The patient was identified by name and date of birth  Eric Turner was informed that this is a telemedicine visit and that the visit is being conducted through [facility] and patient was informed that this is a secure, HIPAA-compliant platform  She agrees to proceed     My office door was closed  No one else was in the room  She acknowledged consent and understanding of privacy and security of the video platform  The patient has agreed to participate and understands they can discontinue the visit at any time  Patient is aware this is a billable service  Subjective  Eric Turner is a 52 y o  female with narcolepsy with cataplexy  She is doing well on Xyrem  She has had no cataplexy and remains alert through the day        HPI     Past Medical History:   Diagnosis Date    Anxiety     Asthma     childhood    Depression     Narcolepsy     Obesity        Past Surgical History:   Procedure Laterality Date     SECTION      CHOLECYSTECTOMY      KNEE ARTHROSCOPY Left     MELISSA-EN-Y PROCEDURE         Current Outpatient Medications   Medication Sig Dispense Refill    Albuterol Sulfate (ProAir RespiClick) 643 (90 Base) MCG/ACT AEPB Inhale 1 puff every 4 (four) hours as needed (wheezing) 1 each 1    clonazePAM (KlonoPIN) 1 mg tablet 1/2 tablet in the morning, 1/2 in afternoon, one at bedtime per Dr Isi Deras psychiatrist  0    clonazePAM (KlonoPIN) 2 mg tablet Take 1 tablet (2 mg total) by mouth 2 (two) times a day 1 at 12:00 and 3:30 (in addition to 1 mg dose) per Dr Isi Deras psychiatrist  0    cyanocobalamin 1,000 mcg/mL Q 30 d  X 3 then Q 3 months  1 mL 11    ergocalciferol (VITAMIN D2) 50,000 units Take 1 capsule (50,000 Units total) by mouth once a week After 12 weeks get blood work to determine your next dose  12 capsule 0    ferrous sulfate 325 (65 Fe) mg tablet Take 1 tablet (325 mg total) by mouth daily 90 tablet 1    gabapentin (NEURONTIN) 300 mg capsule Three times a day as needed pain per Dr Isi Deras psychiatrist 180 capsule 1    lansoprazole (PREVACID) 30 mg capsule Take 1 capsule (30 mg total) by mouth daily 90 capsule 0    loxapine (LOXITANE) 10 mg capsule 1 in am, 1 in afternoon, 3 before bed - per Dr Skylar Soto, DISP, 30 G (BD Eclipse Needle) 30G X 1/2" MISC Use daily 30 each 1    Sodium Oxybate (Xyrem) 500 MG/ML SOLN First dose (bedtime): 4 5 g + Second dose (2 5-4 hrs later): 4 5 g = 9 g Total Nightly Dose [Total Qty: 1 month(s)] 270 g 5    Syringe 1 ML MISC Use every 30 (thirty) days 12 each 0    venlafaxine (EFFEXOR) 100 MG tablet Take 1 tablet (100 mg total) by mouth 3 (three) times a day 90 tablet 0     No current facility-administered medications for this visit           Allergies   Allergen Reactions    Codeine GI Intolerance     heartburn    Other      "ALL PAIN MEDICATIONS"    Morphine Itching and Rash       Review of Systems    Review of Systems      Genitourinary hot flashes at night and sleep problems that vary with menstrual cycle    Cardiology none   Gastrointestinal frequent heartburn/acid reflux   Neurology none   Constitutional claustrophobia, fatigue, excessive sweating at night and weight change   Integumentary none   Psychiatry anxiety and depression   Musculoskeletal none   Pulmonary none   ENT none   Endocrine none   Hematological none     Video Exam  General: Alert, appropriate  No distress    Eyes: EOMI, sclerae normal    Nose: No septal deviation    Neuro: Normal motor function  Normal cranial nerve function    Skin: No rash or lesions  Psych: Normal mood and affect  There were no vitals filed for this visit  Physical Exam     I spent 10 minutes directly with the patient during this visit      VIRTUAL VISIT DISCLAIMER    Shavonne Kinney acknowledges that she has consented to an online visit or consultation  She understands that the online visit is based solely on information provided by her, and that, in the absence of a face-to-face physical evaluation by the physician, the diagnosis she receives is both limited and provisional in terms of accuracy and completeness  This is not intended to replace a full medical face-to-face evaluation by the physician  hSavonne Kinney understands and accepts these terms

## 2021-03-10 DIAGNOSIS — Z23 ENCOUNTER FOR IMMUNIZATION: ICD-10-CM

## 2021-03-22 ENCOUNTER — TELEPHONE (OUTPATIENT)
Dept: FAMILY MEDICINE CLINIC | Facility: CLINIC | Age: 51
End: 2021-03-22

## 2021-03-28 ENCOUNTER — NURSE TRIAGE (OUTPATIENT)
Dept: OTHER | Facility: OTHER | Age: 51
End: 2021-03-28

## 2021-03-28 NOTE — TELEPHONE ENCOUNTER
Reason for Disposition   COVID-19 vaccine, injection site reaction (e g , pain, redness, swelling), question about    Answer Assessment - Initial Assessment Questions  1  MAIN CONCERN OR SYMPTOM:  "What is your main concern right now?" "What question do you have?" "What's the main symptom you're worried about?" (e g , fever, pain, redness, swelling)      pain  2  VACCINE: "What vaccination did you receive?" "Is this your first or second shot?" (e g , none; [employer], J&J, Cailin Erickson, Alexsander Jaramillo, other)      Moderna  3  SYMPTOM ONSET: "When did the pain begin?" (e g , not relevant; hours, days)       Today  4  SYMPTOM SEVERITY: "How bad is it?"       Mild from Vaccine but pt has chronic pain she takes medicine for and wants to know if she can take them  5  FEVER: "Is there a fever?" If so, ask: "What is it, how was it measured, and when did it start?"       no  6  PAST REACTIONS: "Have you reacted to immunizations before?" If so, ask: "What happened?"      n/a  7   OTHER SYMPTOMS: "Do you have any other symptoms?"      no    Protocols used: CORONAVIRUS (COVID-19) VACCINE QUESTIONS AND REACTIONS-ADULT-

## 2021-03-28 NOTE — TELEPHONE ENCOUNTER
Regarding: medications and vaccine question   ----- Message from Marco Krause sent at 3/27/2021 11:46 PM EDT -----   called" My wife just got her first vaccine today and when she got it, they told her not to take any pain medication but she is in pain now  I wanted to know what she could take  She has gabapentin   Can she take that?"

## 2021-03-29 NOTE — TELEPHONE ENCOUNTER
Placed follow-up call to patient  Patient informed she is starting to feel better  Patient confirmed taking OTC tylenol with relief  Patient will continue to monitor symptoms and call if worsen

## 2021-04-01 ENCOUNTER — LAB (OUTPATIENT)
Dept: LAB | Facility: CLINIC | Age: 51
End: 2021-04-01
Payer: COMMERCIAL

## 2021-04-01 ENCOUNTER — TRANSCRIBE ORDERS (OUTPATIENT)
Dept: LAB | Facility: CLINIC | Age: 51
End: 2021-04-01

## 2021-04-01 DIAGNOSIS — E55.9 VITAMIN D DEFICIENCY: ICD-10-CM

## 2021-04-01 DIAGNOSIS — E78.00 HYPERCHOLESTEREMIA: ICD-10-CM

## 2021-04-01 DIAGNOSIS — E61.1 IRON DEFICIENCY: ICD-10-CM

## 2021-04-01 DIAGNOSIS — E53.8 B12 DEFICIENCY: ICD-10-CM

## 2021-04-01 LAB
25(OH)D3 SERPL-MCNC: 23 NG/ML (ref 30–100)
ALBUMIN SERPL BCP-MCNC: 3 G/DL (ref 3.5–5)
ALP SERPL-CCNC: 154 U/L (ref 46–116)
ALT SERPL W P-5'-P-CCNC: 72 U/L (ref 12–78)
ANION GAP SERPL CALCULATED.3IONS-SCNC: 5 MMOL/L (ref 4–13)
AST SERPL W P-5'-P-CCNC: 30 U/L (ref 5–45)
BILIRUB SERPL-MCNC: 0.25 MG/DL (ref 0.2–1)
BUN SERPL-MCNC: 12 MG/DL (ref 5–25)
CALCIUM ALBUM COR SERPL-MCNC: 9.5 MG/DL (ref 8.3–10.1)
CALCIUM SERPL-MCNC: 8.7 MG/DL (ref 8.3–10.1)
CHLORIDE SERPL-SCNC: 106 MMOL/L (ref 100–108)
CHOLEST SERPL-MCNC: 230 MG/DL (ref 50–200)
CO2 SERPL-SCNC: 32 MMOL/L (ref 21–32)
CREAT SERPL-MCNC: 0.77 MG/DL (ref 0.6–1.3)
FERRITIN SERPL-MCNC: 25 NG/ML (ref 8–388)
GFR SERPL CREATININE-BSD FRML MDRD: 92 ML/MIN/1.73SQ M
GLUCOSE P FAST SERPL-MCNC: 92 MG/DL (ref 65–99)
HDLC SERPL-MCNC: 65 MG/DL
LDLC SERPL CALC-MCNC: 151 MG/DL (ref 0–100)
NONHDLC SERPL-MCNC: 165 MG/DL
POTASSIUM SERPL-SCNC: 3.9 MMOL/L (ref 3.5–5.3)
PROT SERPL-MCNC: 7.5 G/DL (ref 6.4–8.2)
SODIUM SERPL-SCNC: 143 MMOL/L (ref 136–145)
TRIGL SERPL-MCNC: 70 MG/DL
VIT B12 SERPL-MCNC: 473 PG/ML (ref 100–900)

## 2021-04-01 PROCEDURE — 82306 VITAMIN D 25 HYDROXY: CPT

## 2021-04-01 PROCEDURE — 82728 ASSAY OF FERRITIN: CPT

## 2021-04-01 PROCEDURE — 80053 COMPREHEN METABOLIC PANEL: CPT

## 2021-04-01 PROCEDURE — 36415 COLL VENOUS BLD VENIPUNCTURE: CPT

## 2021-04-01 PROCEDURE — 80061 LIPID PANEL: CPT

## 2021-04-01 PROCEDURE — 82607 VITAMIN B-12: CPT

## 2021-04-04 PROBLEM — R79.0 LOW FERRITIN: Status: ACTIVE | Noted: 2021-04-04

## 2021-04-04 PROBLEM — E78.00 HYPERCHOLESTEREMIA: Status: ACTIVE | Noted: 2021-04-04

## 2021-04-04 PROBLEM — R74.8 ELEVATED ALKALINE PHOSPHATASE LEVEL: Status: ACTIVE | Noted: 2021-04-04

## 2021-04-04 NOTE — PROGRESS NOTES
Assessment/Plan:  1  Other iron deficiency anemia  Suspect multifactorial with history of gastric bypass, but also heavy menses likely contributing  Recommend she start iron, update labs after this, see GYN in regards to heavy menses  - Ferritin; Future    2  Vitamin D deficiency  Very low at 15  Start prescription vitamin D  Update labs in 3 months or so  - ergocalciferol (VITAMIN D2) 50,000 units; Take 1 capsule (50,000 Units total) by mouth once a week for 12 doses  Dispense: 12 capsule; Refill: 0  - Vitamin D 25 hydroxy; Future    3  Vitamin B12 deficiency  Not at goal  Will continue B12 shots monthly at home  - Vitamin B12; Future    4  S/P gastric bypass  Pt gaining weight  Declines referral to dietician  5  Elevated alkaline phosphatase level  Levels up, have been lower in past but also higher  Other LFTs normal  + significant weight gain  Will watch     - ferrous sulfate 325 (65 Fe) mg tablet; Take 1 tablet (325 mg total) by mouth daily  Dispense: 90 tablet; Refill: 1    6  Low ferritin  Levels have been up and down  7  Hypercholesteremia  Not at goal  ASCVD risk 1%  Work on diet changes to help  - Comprehensive metabolic panel; Future  - Lipid panel; Future    8  Class 2 severe obesity due to excess calories with serious comorbidity and body mass index (BMI) of 38 0 to 38 9 in adult [facility])  Encouraged diet and exercise to help for a healthier BMI  9  Anemia due to other cause, not classified  See above   - ferrous sulfate 325 (65 Fe) mg tablet; Take 1 tablet (325 mg total) by mouth daily  Dispense: 90 tablet; Refill: 1    10  B12 deficiency  See above   - cyanocobalamin 1,000 mcg/mL; Q 30 d  X 3 then Q 3 months  Dispense: 1 mL; Refill: 11    11  Menorrhagia with irregular cycle  See above     12  Screening mammogram, encounter for  Prescription   - Mammo screening bilateral w 3d & cad; Future    13  Cold sore  New  Start:  - valACYclovir (VALTREX) 1,000 mg tablet;  Take 2 tablets (2,000 mg total) by mouth 2 (two) times a day for 1 day  Dispense: 12 tablet; Refill: 5    14  Weight gain  See above  Add TSH to next labs  Working with psych as she feels meds are causing this  - TSH, 3rd generation with Free T4 reflex; Future    Return in 3 months (on 7/6/2021) for Annual physicaln & labs   Subjective:   Malik Allen is a 52 y o  female here today for a follow-up on her current medical conditions:  Patient Active Problem List   Diagnosis    Bicipital tenosynovitis    Bursitis of shoulder    Arthralgia    Primary narcolepsy with cataplexy    Anxiety    S/P gastric bypass    GERD (gastroesophageal reflux disease)    Seasonal allergic rhinitis due to pollen    Mild intermittent asthma without complication    Migraine with aura and without status migrainosus, not intractable    Anemia    Vitamin B12 deficiency    Iron deficiency anemia    External hemorrhoid    Vitamin D deficiency    Prediabetes    Severe episode of recurrent major depressive disorder, with psychotic features ([facility] [address] )    Class 2 severe obesity due to excess calories with serious comorbidity and body mass index (BMI) of 38 0 to 38 9 in adult (HCC)    Elevated alkaline phosphatase level    Low ferritin    Hypercholesteremia        Current Outpatient Medications   Medication Sig Dispense Refill    Albuterol Sulfate (ProAir RespiClick) 635 (90 Base) MCG/ACT AEPB Inhale 1 puff every 4 (four) hours as needed (wheezing) 1 each 1    clonazePAM (KlonoPIN) 1 mg tablet 1/2 tablet in the morning, 1/2 in afternoon, one at bedtime per Dr Gerda Schneider psychiatrist  0    clonazePAM (KlonoPIN) 2 mg tablet Take 1 tablet (2 mg total) by mouth 2 (two) times a day 1 at 12:00 and 3:30 (in addition to 1 mg dose) per Dr Gerda Schneider psychiatrist  0    cyanocobalamin 1,000 mcg/mL Q 30 d  X 3 then Q 3 months   1 mL 11    ergocalciferol (VITAMIN D2) 50,000 units Take 1 capsule (50,000 Units total) by mouth once a week for 12 doses 12 capsule 0    ferrous sulfate 325 (65 Fe) mg tablet Take 1 tablet (325 mg total) by mouth daily 90 tablet 1    gabapentin (NEURONTIN) 300 mg capsule Three times a day as needed pain per Dr Shawn Antoine psychiatrist 180 capsule 1    lansoprazole (PREVACID) 30 mg capsule Take 1 capsule (30 mg total) by mouth daily 90 capsule 0    loxapine (LOXITANE) 10 mg capsule 1 in am, 1 in afternoon, 3 before bed - per Dr Trav Betancourt, DISP, 30 G (BD Eclipse Needle) 30G X 1/2" MISC Use daily 30 each 1    Sodium Oxybate (Xyrem) 500 MG/ML SOLN First dose (bedtime): 4 5 g + Second dose (2 5-4 hrs later): 4 5 g = 9 g Total Nightly Dose [Total Qty: 1 month(s)] 270 g 5    Syringe 1 ML MISC Use every 30 (thirty) days 12 each 0    valACYclovir (VALTREX) 1,000 mg tablet Take 2 tablets (2,000 mg total) by mouth 2 (two) times a day for 1 day 12 tablet 5    venlafaxine (EFFEXOR) 100 MG tablet Take 1 tablet (100 mg total) by mouth 3 (three) times a day 90 tablet 0     No current facility-administered medications for this visit  HPI:  No chief complaint on file  -- Above per clinical staff and reviewed  --    PHQ-9 Depression Screening    PHQ-9:   Frequency of the following problems over the past two weeks:             [facility] pt transferring [facility]  shared records reviewed  Narcolepsy [facility] cataplexy - follows with Dr Mayda Fuchs    saw rheum - dx FM   s/p gastric bypass   pt says she cannot fast for 12 hours due to narcolepsy meds   Baljit labs - Call pt due to number of abn  ormal tests  -   Will need to start B12 injections - she can do these at home  Q month x 3 months then every 3 months  Cholesterol is high -Ferritin low - increase to twice a day   Vitamin D 89081 weekly   Today:  Pt says she is doing well   Gaining weight is her only concern - thinks it is from psych meds   Mood "okay"  Periods are irregular, heavy most of the time   5 days     april labs B12 281 to 472, ferritin 14 to 25, vit D 7 to 23, ALk Phos 121 to 154 (has been this high in past)  albumin 3 1 to 3 0 , GFR 92, AST 27 to 30, ALT 39 to 72 , chol 230, TG 70, HDL 78 to 65,  to 151     Weight currently 250   Not interested in seeing a dietician   Now more picky lately and then she snacks       The following portions of the patient's history were reviewed and updated as appropriate: allergies, current medications, past family history, past medical history, past social history, past surgical history and problem list     Objective:  Vitals:  Ht 5' 4" (1 626 m)   Wt 113 kg (250 lb)   BMI 42 91 kg/m²    Wt Readings from Last 3 Encounters:   04/06/21 113 kg (250 lb)   12/22/20 102 kg (225 lb)   02/14/20 73 5 kg (162 lb)      BP Readings from Last 3 Encounters:   12/22/20 130/78   02/14/20 100/68   10/02/19 106/74        Review of Systems   She has no other concerns  +weight gain  No chest pain, SOB, or palpitations  No GERD  No changes in bowels or bladder  Sleeping well  No mood changes  + heavy menses  Physical Exam   Constitutional:  she appears well-developed and well-nourished  HENT: Head: Normocephalic  Neck: Neck supple  Cardiovascular: Normal rate, regular rhythm and normal heart sounds  Pulmonary/Chest: Effort normal and breath sounds normal  No wheezes, rales, or rhonchi  Abdominal: Soft  Bowel sounds are normal  There is no tenderness  No hepatosplenomegaly  Musculoskeletal: she exhibits no edema  Lymphadenopathy: she has no cervical adenopathy  Neurological: she is alert and oriented to person, place, and time  Skin: Skin is warm and dry  Psychiatric: she has a normal mood and affect  her behavior is normal  Thought content normal      BMI Counseling: Body mass index is 42 91 kg/m²  The BMI is above normal  Nutrition recommendations include decreasing portion sizes  Exercise recommendations include exercising 3-5 times per week

## 2021-04-06 ENCOUNTER — TELEMEDICINE (OUTPATIENT)
Dept: FAMILY MEDICINE CLINIC | Facility: CLINIC | Age: 51
End: 2021-04-06
Payer: COMMERCIAL

## 2021-04-06 ENCOUNTER — TELEPHONE (OUTPATIENT)
Dept: FAMILY MEDICINE CLINIC | Facility: CLINIC | Age: 51
End: 2021-04-06

## 2021-04-06 VITALS — HEIGHT: 64 IN | BODY MASS INDEX: 42.68 KG/M2 | WEIGHT: 250 LBS

## 2021-04-06 DIAGNOSIS — N92.1 MENORRHAGIA WITH IRREGULAR CYCLE: ICD-10-CM

## 2021-04-06 DIAGNOSIS — E53.8 B12 DEFICIENCY: ICD-10-CM

## 2021-04-06 DIAGNOSIS — R79.0 LOW FERRITIN: ICD-10-CM

## 2021-04-06 DIAGNOSIS — E78.00 HYPERCHOLESTEREMIA: ICD-10-CM

## 2021-04-06 DIAGNOSIS — D50.8 OTHER IRON DEFICIENCY ANEMIA: Primary | ICD-10-CM

## 2021-04-06 DIAGNOSIS — R74.8 ELEVATED ALKALINE PHOSPHATASE LEVEL: ICD-10-CM

## 2021-04-06 DIAGNOSIS — Z98.84 S/P GASTRIC BYPASS: ICD-10-CM

## 2021-04-06 DIAGNOSIS — E55.9 VITAMIN D DEFICIENCY: ICD-10-CM

## 2021-04-06 DIAGNOSIS — E53.8 VITAMIN B12 DEFICIENCY: ICD-10-CM

## 2021-04-06 DIAGNOSIS — Z12.31 SCREENING MAMMOGRAM, ENCOUNTER FOR: ICD-10-CM

## 2021-04-06 DIAGNOSIS — E66.01 CLASS 2 SEVERE OBESITY DUE TO EXCESS CALORIES WITH SERIOUS COMORBIDITY AND BODY MASS INDEX (BMI) OF 38.0 TO 38.9 IN ADULT (HCC): ICD-10-CM

## 2021-04-06 DIAGNOSIS — R63.5 WEIGHT GAIN: ICD-10-CM

## 2021-04-06 DIAGNOSIS — D64.89 ANEMIA DUE TO OTHER CAUSE, NOT CLASSIFIED: ICD-10-CM

## 2021-04-06 DIAGNOSIS — B00.1 COLD SORE: ICD-10-CM

## 2021-04-06 PROCEDURE — 1036F TOBACCO NON-USER: CPT | Performed by: FAMILY MEDICINE

## 2021-04-06 PROCEDURE — 3008F BODY MASS INDEX DOCD: CPT | Performed by: FAMILY MEDICINE

## 2021-04-06 PROCEDURE — 99214 OFFICE O/P EST MOD 30 MIN: CPT | Performed by: FAMILY MEDICINE

## 2021-04-06 RX ORDER — FERROUS SULFATE 325(65) MG
325 TABLET ORAL DAILY
Qty: 90 TABLET | Refills: 1 | Status: SHIPPED | OUTPATIENT
Start: 2021-04-06 | End: 2021-06-01 | Stop reason: SDUPTHER

## 2021-04-06 RX ORDER — VALACYCLOVIR HYDROCHLORIDE 1 G/1
2000 TABLET, FILM COATED ORAL 2 TIMES DAILY
Qty: 12 TABLET | Refills: 5 | Status: SHIPPED | OUTPATIENT
Start: 2021-04-06 | End: 2021-10-25 | Stop reason: SDUPTHER

## 2021-04-06 RX ORDER — ERGOCALCIFEROL 1.25 MG/1
50000 CAPSULE ORAL WEEKLY
Qty: 12 CAPSULE | Refills: 0 | Status: SHIPPED | OUTPATIENT
Start: 2021-04-06 | End: 2021-07-02 | Stop reason: SDUPTHER

## 2021-04-06 RX ORDER — CYANOCOBALAMIN 1000 UG/ML
INJECTION INTRAMUSCULAR; SUBCUTANEOUS
Qty: 1 ML | Refills: 11 | Status: SHIPPED | OUTPATIENT
Start: 2021-04-06 | End: 2021-07-02

## 2021-04-06 NOTE — TELEPHONE ENCOUNTER
Patient left message stating she had her COVID vaccine and is experiencing a bad reaction, placed call and left non detailed message to return call

## 2021-04-07 NOTE — TELEPHONE ENCOUNTER
Patient had lots of pain in her joints and had low grade temp, was sick for 4 days -felt like she had the flu, nausea  Patient is currently not experiencing these symptoms just wanted to let PCP know she had side effects  She will be going for her 2nd Moderna shot on 4/23/21

## 2021-06-01 ENCOUNTER — PATIENT MESSAGE (OUTPATIENT)
Dept: FAMILY MEDICINE CLINIC | Facility: CLINIC | Age: 51
End: 2021-06-01

## 2021-06-01 DIAGNOSIS — K21.9 GASTROESOPHAGEAL REFLUX DISEASE: ICD-10-CM

## 2021-06-01 DIAGNOSIS — D64.89 ANEMIA DUE TO OTHER CAUSE, NOT CLASSIFIED: ICD-10-CM

## 2021-06-01 DIAGNOSIS — R74.8 ELEVATED ALKALINE PHOSPHATASE LEVEL: ICD-10-CM

## 2021-06-01 NOTE — TELEPHONE ENCOUNTER
Medication: Ferrous Sulfate, Prevacid   Last refilled: 4/6/21, 12/1/20  Last Office Visit: 4/6/21  Next Office Visit: 6/24/21  Ph[facility]: [facility]     Patient informed she lost her supply filled in April for Ferrous Sulfate   Patient aware insurance may kick back on this approval

## 2021-06-01 NOTE — TELEPHONE ENCOUNTER
From: Isi Hernandez  To: Nabila Adames DO  Sent: 6/1/2021 3:37 PM EDT  Subject: Prescription Question    Hello,     I am in need of 2 medicine refills  1 is my Iron and the other is my reflux medicine  Could you or your staff call them into [facility] on [facility]       Thank you   Geraldine Lopes

## 2021-06-02 RX ORDER — FERROUS SULFATE 325(65) MG
325 TABLET ORAL DAILY
Qty: 90 TABLET | Refills: 1 | Status: SHIPPED | OUTPATIENT
Start: 2021-06-02 | End: 2021-07-02 | Stop reason: SDUPTHER

## 2021-06-02 RX ORDER — LANSOPRAZOLE 30 MG/1
30 CAPSULE, DELAYED RELEASE ORAL DAILY
Qty: 90 CAPSULE | Refills: 1 | Status: SHIPPED | OUTPATIENT
Start: 2021-06-02 | End: 2021-12-27 | Stop reason: SDUPTHER

## 2021-06-15 DIAGNOSIS — G47.411 NARCOLEPSY AND CATAPLEXY: ICD-10-CM

## 2021-06-15 DIAGNOSIS — G47.419 PRIMARY NARCOLEPSY WITHOUT CATAPLEXY: ICD-10-CM

## 2021-06-22 RX ORDER — SODIUM OXYBATE 0.5 G/ML
SOLUTION ORAL
Qty: 540 ML | Refills: 5 | Status: SHIPPED | OUTPATIENT
Start: 2021-06-22 | End: 2021-12-01 | Stop reason: SDUPTHER

## 2021-06-30 ENCOUNTER — TELEPHONE (OUTPATIENT)
Dept: FAMILY MEDICINE CLINIC | Facility: CLINIC | Age: 51
End: 2021-06-30

## 2021-06-30 RX ORDER — ALPRAZOLAM 1 MG/1
1 TABLET ORAL DAILY
COMMUNITY
Start: 2021-05-29 | End: 2022-05-09

## 2021-06-30 RX ORDER — NEEDLES, FILTER 19GX1 1/2"
NEEDLE, DISPOSABLE MISCELLANEOUS
COMMUNITY
Start: 2021-06-10 | End: 2022-05-09

## 2021-06-30 RX ORDER — CLONAZEPAM 0.5 MG/1
0.5 TABLET ORAL 4 TIMES DAILY
COMMUNITY
Start: 2021-06-13 | End: 2021-10-25

## 2021-07-01 ENCOUNTER — APPOINTMENT (OUTPATIENT)
Dept: LAB | Facility: AMBULARY SURGERY CENTER | Age: 51
End: 2021-07-01
Payer: COMMERCIAL

## 2021-07-01 DIAGNOSIS — E78.00 HYPERCHOLESTEREMIA: ICD-10-CM

## 2021-07-01 DIAGNOSIS — D50.8 OTHER IRON DEFICIENCY ANEMIA: ICD-10-CM

## 2021-07-01 DIAGNOSIS — E53.8 VITAMIN B12 DEFICIENCY: ICD-10-CM

## 2021-07-01 DIAGNOSIS — E55.9 VITAMIN D DEFICIENCY: ICD-10-CM

## 2021-07-01 DIAGNOSIS — R63.5 WEIGHT GAIN: ICD-10-CM

## 2021-07-01 LAB
25(OH)D3 SERPL-MCNC: 18.7 NG/ML (ref 30–100)
ALBUMIN SERPL BCP-MCNC: 2.9 G/DL (ref 3.5–5)
ALP SERPL-CCNC: 117 U/L (ref 46–116)
ALT SERPL W P-5'-P-CCNC: 24 U/L (ref 12–78)
ANION GAP SERPL CALCULATED.3IONS-SCNC: 5 MMOL/L (ref 4–13)
AST SERPL W P-5'-P-CCNC: 15 U/L (ref 5–45)
BILIRUB SERPL-MCNC: 0.33 MG/DL (ref 0.2–1)
BUN SERPL-MCNC: 9 MG/DL (ref 5–25)
CALCIUM ALBUM COR SERPL-MCNC: 9.5 MG/DL (ref 8.3–10.1)
CALCIUM SERPL-MCNC: 8.6 MG/DL (ref 8.3–10.1)
CHLORIDE SERPL-SCNC: 108 MMOL/L (ref 100–108)
CHOLEST SERPL-MCNC: 214 MG/DL (ref 50–200)
CO2 SERPL-SCNC: 25 MMOL/L (ref 21–32)
CREAT SERPL-MCNC: 0.62 MG/DL (ref 0.6–1.3)
FERRITIN SERPL-MCNC: 20 NG/ML (ref 8–388)
GFR SERPL CREATININE-BSD FRML MDRD: 106 ML/MIN/1.73SQ M
GLUCOSE P FAST SERPL-MCNC: 92 MG/DL (ref 65–99)
HDLC SERPL-MCNC: 58 MG/DL
LDLC SERPL CALC-MCNC: 136 MG/DL (ref 0–100)
NONHDLC SERPL-MCNC: 156 MG/DL
POTASSIUM SERPL-SCNC: 3.8 MMOL/L (ref 3.5–5.3)
PROT SERPL-MCNC: 7.3 G/DL (ref 6.4–8.2)
SODIUM SERPL-SCNC: 138 MMOL/L (ref 136–145)
TRIGL SERPL-MCNC: 102 MG/DL
TSH SERPL DL<=0.05 MIU/L-ACNC: 2.79 UIU/ML (ref 0.36–3.74)
VIT B12 SERPL-MCNC: 325 PG/ML (ref 100–900)

## 2021-07-01 PROCEDURE — 82306 VITAMIN D 25 HYDROXY: CPT

## 2021-07-01 PROCEDURE — 84443 ASSAY THYROID STIM HORMONE: CPT

## 2021-07-01 PROCEDURE — 36415 COLL VENOUS BLD VENIPUNCTURE: CPT

## 2021-07-01 PROCEDURE — 82728 ASSAY OF FERRITIN: CPT

## 2021-07-01 PROCEDURE — 80061 LIPID PANEL: CPT

## 2021-07-01 PROCEDURE — 80053 COMPREHEN METABOLIC PANEL: CPT

## 2021-07-01 PROCEDURE — 82607 VITAMIN B-12: CPT

## 2021-07-02 ENCOUNTER — OFFICE VISIT (OUTPATIENT)
Dept: FAMILY MEDICINE CLINIC | Facility: CLINIC | Age: 51
End: 2021-07-02
Payer: COMMERCIAL

## 2021-07-02 VITALS
BODY MASS INDEX: 44.69 KG/M2 | TEMPERATURE: 98 F | WEIGHT: 261.8 LBS | HEIGHT: 64 IN | SYSTOLIC BLOOD PRESSURE: 132 MMHG | DIASTOLIC BLOOD PRESSURE: 80 MMHG | OXYGEN SATURATION: 98 % | RESPIRATION RATE: 18 BRPM | HEART RATE: 112 BPM

## 2021-07-02 DIAGNOSIS — Z00.00 WELL ADULT EXAM: Primary | ICD-10-CM

## 2021-07-02 DIAGNOSIS — E53.8 B12 DEFICIENCY: ICD-10-CM

## 2021-07-02 DIAGNOSIS — K21.9 GASTROESOPHAGEAL REFLUX DISEASE, UNSPECIFIED WHETHER ESOPHAGITIS PRESENT: ICD-10-CM

## 2021-07-02 DIAGNOSIS — E61.1 IRON DEFICIENCY: ICD-10-CM

## 2021-07-02 DIAGNOSIS — R74.8 ELEVATED ALKALINE PHOSPHATASE LEVEL: ICD-10-CM

## 2021-07-02 DIAGNOSIS — D64.89 ANEMIA DUE TO OTHER CAUSE, NOT CLASSIFIED: ICD-10-CM

## 2021-07-02 DIAGNOSIS — E55.9 VITAMIN D DEFICIENCY: ICD-10-CM

## 2021-07-02 DIAGNOSIS — Z98.84 S/P GASTRIC BYPASS: ICD-10-CM

## 2021-07-02 DIAGNOSIS — F33.3 SEVERE EPISODE OF RECURRENT MAJOR DEPRESSIVE DISORDER, WITH PSYCHOTIC FEATURES (HCC): ICD-10-CM

## 2021-07-02 PROCEDURE — 3725F SCREEN DEPRESSION PERFORMED: CPT | Performed by: FAMILY MEDICINE

## 2021-07-02 PROCEDURE — 99214 OFFICE O/P EST MOD 30 MIN: CPT | Performed by: FAMILY MEDICINE

## 2021-07-02 PROCEDURE — 1036F TOBACCO NON-USER: CPT | Performed by: FAMILY MEDICINE

## 2021-07-02 PROCEDURE — 99396 PREV VISIT EST AGE 40-64: CPT | Performed by: FAMILY MEDICINE

## 2021-07-02 PROCEDURE — 3008F BODY MASS INDEX DOCD: CPT | Performed by: FAMILY MEDICINE

## 2021-07-02 RX ORDER — CYANOCOBALAMIN 1000 UG/ML
1000 INJECTION INTRAMUSCULAR; SUBCUTANEOUS ONCE
Status: COMPLETED | OUTPATIENT
Start: 2021-07-02 | End: 2021-07-02

## 2021-07-02 RX ORDER — FERROUS SULFATE 325(65) MG
325 TABLET ORAL 2 TIMES DAILY WITH MEALS
Qty: 180 TABLET | Refills: 1 | Status: SHIPPED | OUTPATIENT
Start: 2021-07-02 | End: 2021-09-21 | Stop reason: SDUPTHER

## 2021-07-02 RX ORDER — ERGOCALCIFEROL 1.25 MG/1
50000 CAPSULE ORAL 2 TIMES WEEKLY
Qty: 30 CAPSULE | Refills: 5 | Status: SHIPPED | OUTPATIENT
Start: 2021-07-05 | End: 2021-08-19 | Stop reason: SDUPTHER

## 2021-07-02 RX ORDER — CYANOCOBALAMIN 1000 UG/ML
INJECTION INTRAMUSCULAR; SUBCUTANEOUS
Qty: 30 ML | Refills: 11 | Status: SHIPPED | OUTPATIENT
Start: 2021-07-02

## 2021-07-02 RX ADMIN — CYANOCOBALAMIN 1000 MCG: 1000 INJECTION INTRAMUSCULAR; SUBCUTANEOUS at 15:28

## 2021-07-02 NOTE — PROGRESS NOTES
Assessment/Plan:         Well adult exam  ·         Continue healthy diet   ·         Encourage exercise 4 times a week or more for minimum 30 minutes  ·         Continue to see dentist, wear seatbelt  ·         Health maintenance reviewed - reminded patient update her mammogram   Reviewed age appropriate health maintenance screenings and immunizations that are due, risks and benefits of these  Health Maintenance   Topic Date Due    MAMMOGRAM  Never done    Annual Physical  Never done    Influenza Vaccine (1) 09/01/2021    HIV Screening  08/02/2021 (Originally 5/8/1985)    Depression Remission PHQ  12/22/2021    BMI: Followup Plan  04/07/2022    BMI: Adult  07/02/2022    Cervical Cancer Screening  10/16/2023    DTaP,Tdap,and Td Vaccines (2 - Td or Tdap) 12/22/2030    Pneumococcal Vaccine: Pediatrics (0 to 5 Years) and At-Risk Patients (6 to 59 Years) (2 of 2 - PPSV23) 05/08/2037    Hepatitis C Screening  Completed    COVID-19 Vaccine  Completed    HIB Vaccine  Aged Out    Hepatitis B Vaccine  Aged Out    IPV Vaccine  Aged Out    Hepatitis A Vaccine  Aged Out    Meningococcal ACWY Vaccine  Aged Out    HPV Vaccine  Aged Out     No follow-ups on file  Subjective:    DANNI Nash is a 54 y o  female who presents today for a physical      Chief Complaint   Patient presents with    Physical Exam     PHQ-9 Depression Screening    PHQ-9:   Frequency of the following problems over the past two weeks:            ---Above per clinical staff & reviewed  ---  Patient here today for a physical:    Diet: not great   Exercise:  None   Dental visits:  Yes   Seatbelt: yes     Concerns today:  She is doing counseling and seeing psychiatrist   Not sleeping well  Appetite up - eating good and bad food   Taking all medications as directed          The following portions of the patient's history were reviewed and updated as appropriate: allergies, current medications, past family history, past medical history, past social history, past surgical history and problem list      Current Outpatient Medications   Medication Sig Dispense Refill    Albuterol Sulfate (ProAir RespiClick) 838 (90 Base) MCG/ACT AEPB Inhale 1 puff every 4 (four) hours as needed (wheezing) 1 each 1    ALPRAZolam (XANAX) 1 mg tablet Take 1 mg by mouth daily      B-D INTEGRA SYRINGE 23G X 1" 3 ML MISC USE EVERY 30 DAYS      clonazePAM (KlonoPIN) 0 5 mg tablet Take 0 5 mg by mouth 4 (four) times a day      clonazePAM (KlonoPIN) 1 mg tablet 1/2 tablet in the morning, 1/2 in afternoon, one at bedtime per Dr Dg Mahoney psychiatrist  0    clonazePAM (KlonoPIN) 2 mg tablet Take 1 tablet (2 mg total) by mouth 2 (two) times a day 1 at 12:00 and 3:30 (in addition to 1 mg dose) per Dr Dg Mahoney psychiatrist  0    cyanocobalamin 1,000 mcg/mL 1 ml SQ Every 2 weeks with syringe and needles 30 mL 11    [START ON 7/5/2021] ergocalciferol (VITAMIN D2) 50,000 units Take 1 capsule (50,000 Units total) by mouth 2 (two) times a week 30 capsule 5    ferrous sulfate 325 (65 Fe) mg tablet Take 1 tablet (325 mg total) by mouth 2 (two) times a day with meals 180 tablet 1    gabapentin (NEURONTIN) 300 mg capsule Three times a day as needed pain per Dr Dg Mahoney psychiatrist 180 capsule 1    lansoprazole (PREVACID) 30 mg capsule Take 1 capsule (30 mg total) by mouth daily 90 capsule 1    loxapine (LOXITANE) 10 mg capsule 1 in am, 1 in afternoon, 3 before bed - per Dr Maria Elena Srinivasan NEEDLE, DISP, 30 G (BD Eclipse Needle) 30G X 1/2" MISC Use daily 30 each 1    NEEDLE, DISP, 30 G (BD Eclipse Needle) 30G X 1/2" MISC Use every 30 (thirty) days 4 each 5    Sodium Oxybate (Xyrem) 500 MG/ML SOLN First dose (bedtime): 4 5 g + Second dose (2 5-4 hrs later): 4 5 g = 9 g Total Nightly Dose [Total Qty: 1 month(s)] 540 mL 5    Syringe 1 ML MISC Use every 30 (thirty) days 12 each 0    valACYclovir (VALTREX) 1,000 mg tablet Take 2 tablets (2,000 mg total) by mouth 2 (two) times a day for 1 day 12 tablet 5    venlafaxine (EFFEXOR) 100 MG tablet Take 1 tablet (100 mg total) by mouth 3 (three) times a day 90 tablet 0     Current Facility-Administered Medications   Medication Dose Route Frequency Provider Last Rate Last Admin    cyanocobalamin injection 1,000 mcg  1,000 mcg Intramuscular Once [place] Ek, DO            Objective:      /80   Pulse (!) 112   Temp 98 °F (36 7 °C)   Resp 18   Ht 5' 4 09" (1 628 m)   Wt 119 kg (261 lb 12 8 oz)   SpO2 98%   BMI 44 81 kg/m²   BP Readings from Last 3 Encounters:   07/02/21 132/80   12/22/20 130/78   02/14/20 100/68     Wt Readings from Last 3 Encounters:   07/02/21 119 kg (261 lb 12 8 oz)   04/06/21 113 kg (250 lb)   12/22/20 102 kg (225 lb)       Review of Systems  ROS:  all others negative - no chest pain, SOB, normal urine and bowels  + GERD  sleeping well  Mood not  good  Physical Exam   Constitutional: she appears well-developed and well-nourished  HENT: Head: Normocephalic  Right Ear: External ear normal  Tympanic membrane normal    Left Ear: External ear normal  Tympanic membrane normal    Nose: Nose normal  No mucosal edema, No rhinorrhea  Right sinus exhibits no maxillary sinus tenderness  Left sinus exhibits no maxillary sinus tenderness  Mouth/Throat: Oropharynx is clear and moist    Eyes: Normal conjunctiva  No erythema  No discharge  Neck: No pain on exam  Neck supple  Cardiovascular: Normal rate, regular rhythm and normal heart sounds  Pulmonary/Chest: Effort normal and breath sounds normal  No wheezes  No rales  No rhonchi  Abdominal: Soft  Bowel sounds are normal  There is no tenderness  Musculoskeletal: she exhibits no edema  Lymphadenopathy: she has no cervical adenopathy  Neurological: she  is alert and oriented to person, place, and time  Skin: Skin is warm and dry  No rashes  Psychiatric: she  has a normal mood and affect   her behavior is normal  Thought content normal    Vitals reviewed

## 2021-07-02 NOTE — PROGRESS NOTES
Assessment/Plan:  1  Vitamin D deficiency  Component      Latest Ref Rng & Units 6/17/2020 12/30/2020 4/1/2021 7/1/2021   Vit D, 25-Hydroxy      30 0 - 100 0 ng/mL 21 9 (L) 7 1 (L) 23 0 (L) 18 7 (L)   Level not improved despite weekly vitamin-D 24364 units  Will increase to twice a week  See below  - ergocalciferol (VITAMIN D2) 50,000 units; Take 1 capsule (50,000 Units total) by mouth 2 (two) times a week  Dispense: 30 capsule; Refill: 5  - Iron, TIBC and Ferritin Panel; Future  - Vitamin B12; Future  - CBC and differential; Future  - Vitamin D 25 hydroxy; Future    2  Anemia due to other cause, not classified  Component      Latest Ref Rng & Units 10/2/2019 6/17/2020 4/1/2021 7/1/2021   Ferritin      8 - 388 ng/mL 48 48 25 20     - ferrous sulfate 325 (65 Fe) mg tablet; Take 1 tablet (325 mg total) by mouth 2 (two) times a day with meals  Dispense: 180 tablet; Refill: 1  - Iron, TIBC and Ferritin Panel; Future  - Vitamin B12; Future  - CBC and differential; Future  - Vitamin D 25 hydroxy; Future    3  Elevated alkaline phosphatase level  Ferritin level not improved despite daily ferritin  Will increase to twice a day  Also see below  - ferrous sulfate 325 (65 Fe) mg tablet; Take 1 tablet (325 mg total) by mouth 2 (two) times a day with meals  Dispense: 180 tablet; Refill: 1    4  B12 deficiency  Component      Latest Ref Rng & Units 10/2/2019 6/17/2020 12/30/2020 4/1/2021   Vitamin B-12      100 - 900 pg/mL 1,401 (H) 1,311 (H) 281 473     Component      Latest Ref Rng & Units 7/1/2021   Vitamin B-12      100 - 900 pg/mL 325   Vitamin B12 not improve despite monthly injections  Will increase to every 2 weeks  - cyanocobalamin 1,000 mcg/mL; 1 ml SQ Every 2 weeks with syringe and needles  Dispense: 30 mL; Refill: 11  - cyanocobalamin injection 1,000 mcg  - Iron, TIBC and Ferritin Panel; Future  - Vitamin B12; Future  - CBC and differential; Future  - Vitamin D 25 hydroxy; Future    5   Gastroesophageal reflux disease, unspecified whether esophagitis present  With deficiencies and multiple vitamins suspect patient is having increased absorption issues  She is also having increased reflux symptoms  Recommend she see GI and possible EGD to look for gastritis as cause of absorption issues  Will not stop Protonix at this time despite low vitamin levels as her reflux is worsened  - Ambulatory referral to Gastroenterology; Future    6  Severe episode of recurrent major depressive disorder, with psychotic features [facility])  Patient following closely with psychiatrist and counselor    7  Iron deficiency  See above  - Iron, TIBC and Ferritin Panel; Future  - Vitamin B12; Future  - CBC and differential; Future  - Vitamin D 25 hydroxy; Future    8  Well adult exam  See other note    9  S/P gastric bypass  See above  To suspect vitamin deficiencies are partially to to Vika-en-Y surgery, but without improvement with treatment to recommend referral to GI for further workup  No follow-ups on file      Subjective:   Desi Stevenson is a 54 y o  female here today for a follow-up on her current medical conditions:  Patient Active Problem List   Diagnosis    Bicipital tenosynovitis    Bursitis of shoulder    Arthralgia    Primary narcolepsy with cataplexy    Anxiety    S/P gastric bypass    GERD (gastroesophageal reflux disease)    Seasonal allergic rhinitis due to pollen    Mild intermittent asthma without complication    Migraine with aura and without status migrainosus, not intractable    Anemia    Vitamin B12 deficiency    Iron deficiency anemia    External hemorrhoid    Vitamin D deficiency    Prediabetes    Severe episode of recurrent major depressive disorder, with psychotic features ([address] )    Class 2 severe obesity due to excess calories with serious comorbidity and body mass index (BMI) of 38 0 to 38 9 in adult (HCC)    Elevated alkaline phosphatase level    Low ferritin    Hypercholesteremia        Current Outpatient Medications   Medication Sig Dispense Refill    Albuterol Sulfate (ProAir RespiClick) 071 (90 Base) MCG/ACT AEPB Inhale 1 puff every 4 (four) hours as needed (wheezing) 1 each 1    ALPRAZolam (XANAX) 1 mg tablet Take 1 mg by mouth daily      B-D INTEGRA SYRINGE 23G X 1" 3 ML MISC USE EVERY 30 DAYS      clonazePAM (KlonoPIN) 0 5 mg tablet Take 0 5 mg by mouth 4 (four) times a day      clonazePAM (KlonoPIN) 1 mg tablet 1/2 tablet in the morning, 1/2 in afternoon, one at bedtime per Dr Rashad Rodriges psychiatrist  0    clonazePAM (KlonoPIN) 2 mg tablet Take 1 tablet (2 mg total) by mouth 2 (two) times a day 1 at 12:00 and 3:30 (in addition to 1 mg dose) per Dr Rashad Rodriges psychiatrist  0    cyanocobalamin 1,000 mcg/mL 1 ml SQ Every 2 weeks with syringe and needles 30 mL 11    [START ON 7/5/2021] ergocalciferol (VITAMIN D2) 50,000 units Take 1 capsule (50,000 Units total) by mouth 2 (two) times a week 30 capsule 5    ferrous sulfate 325 (65 Fe) mg tablet Take 1 tablet (325 mg total) by mouth 2 (two) times a day with meals 180 tablet 1    gabapentin (NEURONTIN) 300 mg capsule Three times a day as needed pain per Dr Rashad Rodriges psychiatrist 180 capsule 1    lansoprazole (PREVACID) 30 mg capsule Take 1 capsule (30 mg total) by mouth daily 90 capsule 1    loxapine (LOXITANE) 10 mg capsule 1 in am, 1 in afternoon, 3 before bed - per Dr Chris Lund, DISP, 30 G (BD Eclipse Needle) 30G X 1/2" MISC Use daily 30 each 1    NEEDLE, DISP, 30 G (BD Eclipse Needle) 30G X 1/2" MISC Use every 30 (thirty) days 4 each 5    Sodium Oxybate (Xyrem) 500 MG/ML SOLN First dose (bedtime): 4 5 g + Second dose (2 5-4 hrs later): 4 5 g = 9 g Total Nightly Dose [Total Qty: 1 month(s)] 540 mL 5    Syringe 1 ML MISC Use every 30 (thirty) days 12 each 0    valACYclovir (VALTREX) 1,000 mg tablet Take 2 tablets (2,000 mg total) by mouth 2 (two) times a day for 1 day 12 tablet 5    venlafaxine (EFFEXOR) 100 MG tablet Take 1 tablet (100 mg total) by mouth 3 (three) times a day 90 tablet 0     No current facility-administered medications for this visit  HPI:  Chief Complaint   Patient presents with    Physical Exam     -- Above per clinical staff and reviewed  --    PHQ-9 Depression Screening    PHQ-9:   Frequency of the following problems over the past two weeks:      Little interest or pleasure in doing things: 2 - more than half the days  Feeling down, depressed, or hopeless: 2 - more than half the days  Trouble falling or staying asleep, or sleeping too much: 2 - more than half the days  Feeling tired or having little energy: 1 - several days  Poor appetite or overeatin - more than half the days  Feeling bad about yourself - or that you are a failure or have let yourself or your family down: 1 - several days  Trouble concentrating on things, such as reading the newspaper or watching television: 2 - more than half the days  Moving or speaking so slowly that other people could have noticed  Or the opposite - being so fidgety or restless that you have been moving around a lot more than usual: 0 - not at all  Thoughts that you would be better off dead, or of hurting yourself in some way: 0 - not at all  PHQ-2 Score: 4  PHQ-9 Score: 12       GLENN-7 Flowsheet Screening      Most Recent Value   Over the last 2 weeks, how often have you been bothered by any of the following problems? Feeling nervous, anxious, or on edge  3   Not being able to stop or control worrying  1   Worrying too much about different things  1   Trouble relaxing  3   Being so restless that it is hard to sit still  2   Becoming easily annoyed or irritable  1   Feeling afraid as if something awful might happen  1   GLENN-7 Total Score  12         New pt transferring from [facility]  shared records reviewed   Narcolepsy [facility] cataplexy - follows with Dr Bria Bourgeois    saw rheum - dx FM   s/p gastric bypass   pt says she cannot fast for 12 hours due to narcolepsy meds   Baljit labs - Call pt due to number of abn  ormal tests  -   Will need to start B12 injections - she can do these at home  Q month x 3 months then every 3 months  Cholesterol is high -Ferritin low - increase to twice a day  Vitamin D 14235 weekly   Today:  She is doing counseling and seeing psychiatrist   Not sleeping well  Appetite up - eating good and bad food   Taking all medications as directed  The following portions of the patient's history were reviewed and updated as appropriate: allergies, current medications, past family history, past medical history, past social history, past surgical history and problem list     Objective:  Vitals:  /80   Pulse (!) 112   Temp 98 °F (36 7 °C)   Resp 18   Ht 5' 4 09" (1 628 m)   Wt 119 kg (261 lb 12 8 oz)   SpO2 98%   BMI 44 81 kg/m²    Wt Readings from Last 3 Encounters:   07/02/21 119 kg (261 lb 12 8 oz)   04/06/21 113 kg (250 lb)   12/22/20 102 kg (225 lb)      BP Readings from Last 3 Encounters:   07/02/21 132/80   12/22/20 130/78   02/14/20 100/68        Review of Systems   ROS:  all others negative - no chest pain, SOB, normal urine and bowels  + GERD  sleeping well  Mood not  good  Physical Exam   Constitutional:  she appears well-developed and well-nourished  HENT: Head: Normocephalic  Neck: Neck supple  Cardiovascular: Normal rate, regular rhythm and normal heart sounds  Pulmonary/Chest: Effort normal and breath sounds normal  No wheezes, rales, or rhonchi  Abdominal: Soft  Bowel sounds are normal  There is no tenderness  No hepatosplenomegaly  Musculoskeletal: she exhibits no edema  Lymphadenopathy: she has no cervical adenopathy  Neurological: she is alert and oriented to person, place, and time  Skin: Skin is warm and dry  Psychiatric: she has a normal mood and affect  her behavior is normal  Thought content normal        Depression Screening and Follow-up Plan: Patient's depression screening was positive with a PHQ-2 score of 4  Their PHQ-9 score was 12  Continue regular follow-up with their mental health provider who is managing their mental health condition(s)   Follows with psychiatrist

## 2021-07-02 NOTE — PATIENT INSTRUCTIONS
Remember to schedule your mammogram   Start B12 shots twice a week   Increase vitamin D to 50, 000 iu twice a week   Increase iron to twice a day   Schedule with GI

## 2021-07-16 ENCOUNTER — TELEPHONE (OUTPATIENT)
Dept: FAMILY MEDICINE CLINIC | Facility: CLINIC | Age: 51
End: 2021-07-16

## 2021-08-19 DIAGNOSIS — E55.9 VITAMIN D DEFICIENCY: ICD-10-CM

## 2021-08-20 RX ORDER — ERGOCALCIFEROL 1.25 MG/1
50000 CAPSULE ORAL 2 TIMES WEEKLY
Qty: 30 CAPSULE | Refills: 0 | Status: SHIPPED | OUTPATIENT
Start: 2021-08-23 | End: 2021-10-25 | Stop reason: SDUPTHER

## 2021-09-21 ENCOUNTER — OFFICE VISIT (OUTPATIENT)
Dept: FAMILY MEDICINE CLINIC | Facility: CLINIC | Age: 51
End: 2021-09-21
Payer: COMMERCIAL

## 2021-09-21 ENCOUNTER — APPOINTMENT (OUTPATIENT)
Dept: LAB | Facility: AMBULARY SURGERY CENTER | Age: 51
End: 2021-09-21
Payer: COMMERCIAL

## 2021-09-21 ENCOUNTER — TELEPHONE (OUTPATIENT)
Dept: FAMILY MEDICINE CLINIC | Facility: CLINIC | Age: 51
End: 2021-09-21

## 2021-09-21 VITALS
OXYGEN SATURATION: 99 % | RESPIRATION RATE: 18 BRPM | TEMPERATURE: 97.6 F | WEIGHT: 269.8 LBS | BODY MASS INDEX: 46.06 KG/M2 | HEIGHT: 64 IN | HEART RATE: 100 BPM | DIASTOLIC BLOOD PRESSURE: 94 MMHG | SYSTOLIC BLOOD PRESSURE: 134 MMHG

## 2021-09-21 DIAGNOSIS — R00.0 TACHYCARDIA: Primary | ICD-10-CM

## 2021-09-21 DIAGNOSIS — E55.9 VITAMIN D DEFICIENCY: ICD-10-CM

## 2021-09-21 DIAGNOSIS — F41.9 ANXIETY: ICD-10-CM

## 2021-09-21 DIAGNOSIS — E61.1 IRON DEFICIENCY: ICD-10-CM

## 2021-09-21 DIAGNOSIS — R03.0 ELEVATED BLOOD PRESSURE READING: ICD-10-CM

## 2021-09-21 DIAGNOSIS — F33.3 SEVERE EPISODE OF RECURRENT MAJOR DEPRESSIVE DISORDER, WITH PSYCHOTIC FEATURES (HCC): ICD-10-CM

## 2021-09-21 DIAGNOSIS — R61 EXCESSIVE SWEATING: ICD-10-CM

## 2021-09-21 DIAGNOSIS — D64.89 ANEMIA DUE TO OTHER CAUSE, NOT CLASSIFIED: ICD-10-CM

## 2021-09-21 DIAGNOSIS — E53.8 B12 DEFICIENCY: ICD-10-CM

## 2021-09-21 DIAGNOSIS — L30.4 INTERTRIGO: ICD-10-CM

## 2021-09-21 DIAGNOSIS — E53.8 VITAMIN B12 DEFICIENCY: ICD-10-CM

## 2021-09-21 DIAGNOSIS — R00.0 TACHYCARDIA: ICD-10-CM

## 2021-09-21 DIAGNOSIS — R79.0 LOW FERRITIN: ICD-10-CM

## 2021-09-21 DIAGNOSIS — Z98.84 S/P GASTRIC BYPASS: ICD-10-CM

## 2021-09-21 PROBLEM — E66.813 CLASS 3 SEVERE OBESITY DUE TO EXCESS CALORIES WITH SERIOUS COMORBIDITY IN ADULT (HCC): Status: ACTIVE | Noted: 2020-12-23

## 2021-09-21 LAB
25(OH)D3 SERPL-MCNC: 29.7 NG/ML (ref 30–100)
BASOPHILS # BLD AUTO: 0.04 THOUSANDS/ΜL (ref 0–0.1)
BASOPHILS NFR BLD AUTO: 1 % (ref 0–1)
EOSINOPHIL # BLD AUTO: 0 THOUSAND/ΜL (ref 0–0.61)
EOSINOPHIL NFR BLD AUTO: 0 % (ref 0–6)
ERYTHROCYTE [DISTWIDTH] IN BLOOD BY AUTOMATED COUNT: 13.5 % (ref 11.6–15.1)
FERRITIN SERPL-MCNC: 57 NG/ML (ref 8–388)
HCT VFR BLD AUTO: 43.8 % (ref 34.8–46.1)
HGB BLD-MCNC: 14.1 G/DL (ref 11.5–15.4)
IMM GRANULOCYTES # BLD AUTO: 0.01 THOUSAND/UL (ref 0–0.2)
IMM GRANULOCYTES NFR BLD AUTO: 0 % (ref 0–2)
IRON SATN MFR SERPL: 23 % (ref 15–50)
IRON SERPL-MCNC: 68 UG/DL (ref 50–170)
LYMPHOCYTES # BLD AUTO: 1.64 THOUSANDS/ΜL (ref 0.6–4.47)
LYMPHOCYTES NFR BLD AUTO: 34 % (ref 14–44)
MCH RBC QN AUTO: 29.3 PG (ref 26.8–34.3)
MCHC RBC AUTO-ENTMCNC: 32.2 G/DL (ref 31.4–37.4)
MCV RBC AUTO: 91 FL (ref 82–98)
MONOCYTES # BLD AUTO: 0.43 THOUSAND/ΜL (ref 0.17–1.22)
MONOCYTES NFR BLD AUTO: 9 % (ref 4–12)
NEUTROPHILS # BLD AUTO: 2.67 THOUSANDS/ΜL (ref 1.85–7.62)
NEUTS SEG NFR BLD AUTO: 56 % (ref 43–75)
NRBC BLD AUTO-RTO: 0 /100 WBCS
PLATELET # BLD AUTO: 294 THOUSANDS/UL (ref 149–390)
PMV BLD AUTO: 10.7 FL (ref 8.9–12.7)
RBC # BLD AUTO: 4.81 MILLION/UL (ref 3.81–5.12)
TIBC SERPL-MCNC: 294 UG/DL (ref 250–450)
TSH SERPL DL<=0.05 MIU/L-ACNC: 2.33 UIU/ML (ref 0.36–3.74)
VIT B12 SERPL-MCNC: 667 PG/ML (ref 100–900)
WBC # BLD AUTO: 4.79 THOUSAND/UL (ref 4.31–10.16)

## 2021-09-21 PROCEDURE — 82607 VITAMIN B-12: CPT

## 2021-09-21 PROCEDURE — 1036F TOBACCO NON-USER: CPT | Performed by: FAMILY MEDICINE

## 2021-09-21 PROCEDURE — 99214 OFFICE O/P EST MOD 30 MIN: CPT | Performed by: FAMILY MEDICINE

## 2021-09-21 PROCEDURE — 83550 IRON BINDING TEST: CPT

## 2021-09-21 PROCEDURE — 85025 COMPLETE CBC W/AUTO DIFF WBC: CPT

## 2021-09-21 PROCEDURE — 82728 ASSAY OF FERRITIN: CPT

## 2021-09-21 PROCEDURE — 83540 ASSAY OF IRON: CPT

## 2021-09-21 PROCEDURE — 82306 VITAMIN D 25 HYDROXY: CPT

## 2021-09-21 PROCEDURE — 3008F BODY MASS INDEX DOCD: CPT | Performed by: FAMILY MEDICINE

## 2021-09-21 PROCEDURE — 84443 ASSAY THYROID STIM HORMONE: CPT

## 2021-09-21 PROCEDURE — 36415 COLL VENOUS BLD VENIPUNCTURE: CPT

## 2021-09-21 RX ORDER — FERROUS SULFATE 325(65) MG
325 TABLET ORAL
Qty: 180 TABLET | Refills: 1
Start: 2021-09-21 | End: 2022-05-09 | Stop reason: SDUPTHER

## 2021-09-21 NOTE — PROGRESS NOTES
Assessment/Plan:   1  Tachycardia  Pt concerned about her blood pressure   Reassured her this is new and not too high today   Bring in home cuff for calibration   Taught her right position to check her blood pressure in   Will start metoprolol to help getly lower her blood pressure, heart rate, and may help her anxiety   Suspect her blood pressure and heart rate are high due to anxiety - recommend she speak to her psychiatrist   Reviewed EKG today   No tachycardia at that time  Pt with tremor causing difficulty with baseline reading   No prior for comparison, but no ST or T wave acute changes appreciated today   - TSH, 3rd generation with Free T4 reflex; Future  - metoprolol tartrate (LOPRESSOR) 25 mg tablet; Take 1 tablet (25 mg total) by mouth 2 (two) times a day  Dispense: 60 tablet; Refill: 1  - POCT ECG    2  Elevated blood pressure reading  See above     3  Excessive sweating  Suspect related to anxiety   Will add tsh to lab done today   Consider robbie-menopause   - TSH, 3rd generation with Free T4 reflex; Future    4  Anemia due to other cause, not classified  Improving nicely   Lower over the counter iron to once a day   - ferrous sulfate 325 (65 Fe) mg tablet; Take 1 tablet (325 mg total) by mouth daily with breakfast  Dispense: 180 tablet; Refill: 1    5  Low ferritin  See above     6  Vitamin D deficiency  Improving nicely   Can take vitamin D every other week     7  Vitamin B12 deficiency  Can now start B12 shots at home monthly     8  S/P gastric bypass  See above     9  Anxiety  Not well controlled  Saw psych recently   She has been adjusting some of her meds on her own - advised her against this   She is also taking xanax along with her klonopin - advised against this  Recommend she talk to her psychiatrist again about her anxiety and physical symptoms she is feeling  10  Severe episode of recurrent major depressive disorder, with psychotic features ([facility] [address] )  See above     11   Intertrigo  Pt reports rash on inner thighs - prescription:  - nystatin (MYCOSTATIN) powder; Apply topically 2 (two) times a day as needed (rash)  Dispense: 60 g; Refill: 2      Return in about 1 month (around 10/21/2021) for recheck BP, HR        Subjective:   Pennie Liu is a 54 y o  female here today for a follow-up on her current medical conditions:  Patient Active Problem List   Diagnosis    Bicipital tenosynovitis    Bursitis of shoulder    Arthralgia    Primary narcolepsy with cataplexy    Anxiety    S/P gastric bypass    GERD (gastroesophageal reflux disease)    Seasonal allergic rhinitis due to pollen    Mild intermittent asthma without complication    Migraine with aura and without status migrainosus, not intractable    Anemia    Vitamin B12 deficiency    Iron deficiency anemia    External hemorrhoid    Vitamin D deficiency    Prediabetes    Severe episode of recurrent major depressive disorder, with psychotic features (HCC)    Class 3 severe obesity due to excess calories with serious comorbidity in adult (HCC)    Elevated alkaline phosphatase level    Low ferritin    Hypercholesteremia        Current Medications:  Current Outpatient Medications   Medication Sig Dispense Refill    Albuterol Sulfate (ProAir RespiClick) 233 (90 Base) MCG/ACT AEPB Inhale 1 puff every 4 (four) hours as needed (wheezing) 1 each 1    ALPRAZolam (XANAX) 1 mg tablet Take 1 mg by mouth daily      B-D INTEGRA SYRINGE 23G X 1" 3 ML MISC USE EVERY 30 DAYS      clonazePAM (KlonoPIN) 0 5 mg tablet Take 0 5 mg by mouth 4 (four) times a day      clonazePAM (KlonoPIN) 1 mg tablet 1/2 tablet in the morning, 1/2 in afternoon, one at bedtime per Dr Yury Gutierrez psychiatrist  0    clonazePAM (KlonoPIN) 2 mg tablet Take 1 tablet (2 mg total) by mouth 2 (two) times a day 1 at 12:00 and 3:30 (in addition to 1 mg dose) per Dr Yury Gutierrez psychiatrist (Patient taking differently: Take 2 mg by mouth 4 (four) times a day Cut in half)  0    cyanocobalamin 1,000 mcg/mL 1 ml SQ Every 2 weeks with syringe and needles 30 mL 11    ergocalciferol (VITAMIN D2) 50,000 units Take 1 capsule (50,000 Units total) by mouth 2 (two) times a week 30 capsule 0    ferrous sulfate 325 (65 Fe) mg tablet Take 1 tablet (325 mg total) by mouth daily with breakfast 180 tablet 1    gabapentin (NEURONTIN) 300 mg capsule Three times a day as needed pain per Dr Yary Anton psychiatrist 180 capsule 1    lansoprazole (PREVACID) 30 mg capsule Take 1 capsule (30 mg total) by mouth daily 90 capsule 1    loxapine (LOXITANE) 10 mg capsule 1 in am, 1 in afternoon, 3 before bed - per Dr Isreal Self, DISP, 30 G (BD Eclipse Needle) 30G X 1/2" MISC Use daily 30 each 1    NEEDLE, DISP, 30 G (BD Eclipse Needle) 30G X 1/2" MISC Use every 30 (thirty) days 4 each 5    Sodium Oxybate (Xyrem) 500 MG/ML SOLN First dose (bedtime): 4 5 g + Second dose (2 5-4 hrs later): 4 5 g = 9 g Total Nightly Dose [Total Qty: 1 month(s)] 540 mL 5    Syringe 1 ML MISC Use every 30 (thirty) days 12 each 0    valACYclovir (VALTREX) 1,000 mg tablet Take 2 tablets (2,000 mg total) by mouth 2 (two) times a day for 1 day 12 tablet 5    venlafaxine (EFFEXOR) 100 MG tablet Take 1 tablet (100 mg total) by mouth 3 (three) times a day 90 tablet 0    metoprolol tartrate (LOPRESSOR) 25 mg tablet Take 1 tablet (25 mg total) by mouth 2 (two) times a day 60 tablet 1    nystatin (MYCOSTATIN) powder Apply topically 2 (two) times a day as needed (rash) 60 g 2     No current facility-administered medications for this visit  HPI:  Chief Complaint   Patient presents with    Hypertension    Medication Refill     none needed at this time     Other     Mammo scheduled HIV denied      -- Above per clinical staff and reviewed  --    PHQ-9 Depression Screening    PHQ-9:   Frequency of the following problems over the past two weeks: Will need to start B12 injections - she can do these at home   Q month x 3 months then every 3 months  Cholesterol is high -Ferritin low - increase to twice a day  Vitamin D 66280 bi - eekly   Today:  Daughter Alexis Murders with them, but more on edge with that   She was at the psychiatrist last week and lowered her loxapine - she was having more tremors and wondering if that was causing it   She has since lowered it more   Takes xanax as needed and helps her calm down - uses it daily - only a few  Going to therapist weekly, seeing them for 3 years   Feels like she should be laying down but she cannot   Tremor feeling   Shaking legs a lot   Had B12 shot on 9/15/21 - not her first shot   No weight loss   No diarrhea   + sweating   + shaking       The following portions of the patient's history were reviewed and updated as appropriate: allergies, current medications, past family history, past medical history, past social history, past surgical history and problem list     Objective:  Vitals:  /94   Pulse 100   Temp 97 6 °F (36 4 °C)   Resp 18   Ht 5' 4 09" (1 628 m)   Wt 122 kg (269 lb 12 8 oz)   SpO2 99%   BMI 46 18 kg/m²    Wt Readings from Last 3 Encounters:   09/21/21 122 kg (269 lb 12 8 oz)   07/02/21 119 kg (261 lb 12 8 oz)   04/06/21 113 kg (250 lb)      BP Readings from Last 3 Encounters:   09/21/21 134/94   07/02/21 132/80   12/22/20 130/78        Review of Systems   She has no other concerns  No unexpected weight changes  No chest pain, SOB, or palpitations  No GERD  No changes in bowels or bladder  Not sleeping well  + mood changes  + hot flashes       Physical Exam   Constitutional:  she appears well-developed and well-nourished  HENT: Head: Normocephalic  Neck: Neck supple  Cardiovascular: Normal rate, regular rhythm and normal heart sounds  Pulmonary/Chest: Effort normal and breath sounds normal  No wheezes, rales, or rhonchi  Abdominal: Soft  Bowel sounds are normal  There is no tenderness  No hepatosplenomegaly  Musculoskeletal: she exhibits no edema     Lymphadenopathy: she has no cervical adenopathy  Neurological: she is alert and oriented to person, place, and time  Skin: Skin is warm and dry  Psychiatric: she has a normal mood and affect  her behavior is normal  Thought content normal      BMI Counseling: Body mass index is 46 18 kg/m²  The BMI is above normal  Nutrition recommendations include decreasing portion sizes  Exercise recommendations include exercising 3-5 times per week  Rationale for BMI follow-up plan is due to patient being overweight or obese

## 2021-09-21 NOTE — TELEPHONE ENCOUNTER
BP Reading was 161/96 yesterday - is having nausea and headache, eyes and nose have been watery, feels weak, BP are higher when she is standing than when she is sitting, pt states this has been going on for a week and a half, she usually runs low  Per sarahi advised pt to go to ED, pt refused - scheduled appoint at [address] [place] [address] Mikey today 9/21/21

## 2021-09-22 PROCEDURE — 93000 ELECTROCARDIOGRAM COMPLETE: CPT | Performed by: FAMILY MEDICINE

## 2021-09-22 RX ORDER — NYSTATIN 100000 [USP'U]/G
POWDER TOPICAL 2 TIMES DAILY PRN
Qty: 60 G | Refills: 2 | Status: SHIPPED | OUTPATIENT
Start: 2021-09-22 | End: 2022-05-09

## 2021-09-28 ENCOUNTER — HOSPITAL ENCOUNTER (OUTPATIENT)
Dept: RADIOLOGY | Age: 51
Discharge: HOME | End: 2021-09-28
Payer: COMMERCIAL

## 2021-09-28 VITALS — HEIGHT: 64 IN | WEIGHT: 260 LBS | BODY MASS INDEX: 44.39 KG/M2

## 2021-09-28 DIAGNOSIS — Z12.31 ENCOUNTER FOR SCREENING MAMMOGRAM FOR MALIGNANT NEOPLASM OF BREAST: ICD-10-CM

## 2021-09-28 DIAGNOSIS — Z12.31 SCREENING MAMMOGRAM, ENCOUNTER FOR: ICD-10-CM

## 2021-09-28 PROCEDURE — 77063 BREAST TOMOSYNTHESIS BI: CPT

## 2021-09-28 PROCEDURE — 77067 SCR MAMMO BI INCL CAD: CPT

## 2021-09-29 NOTE — RESULT ENCOUNTER NOTE
Addie Navarro,  Your mammogram is normal  We recommend you schedule your next mammogram in one year     Have a good day,   Dr Cheryle Amberson

## 2021-10-21 ENCOUNTER — TELEPHONE (OUTPATIENT)
Dept: OBGYN CLINIC | Facility: CLINIC | Age: 51
End: 2021-10-21

## 2021-10-25 ENCOUNTER — OFFICE VISIT (OUTPATIENT)
Dept: FAMILY MEDICINE CLINIC | Facility: CLINIC | Age: 51
End: 2021-10-25
Payer: COMMERCIAL

## 2021-10-25 VITALS
TEMPERATURE: 98 F | DIASTOLIC BLOOD PRESSURE: 86 MMHG | SYSTOLIC BLOOD PRESSURE: 132 MMHG | BODY MASS INDEX: 44.97 KG/M2 | OXYGEN SATURATION: 99 % | WEIGHT: 263.4 LBS | HEART RATE: 104 BPM | RESPIRATION RATE: 16 BRPM | HEIGHT: 64 IN

## 2021-10-25 DIAGNOSIS — J45.20 MILD INTERMITTENT ASTHMA WITHOUT COMPLICATION: ICD-10-CM

## 2021-10-25 DIAGNOSIS — B00.1 COLD SORE: ICD-10-CM

## 2021-10-25 DIAGNOSIS — E78.00 HYPERCHOLESTEREMIA: ICD-10-CM

## 2021-10-25 DIAGNOSIS — F41.9 ANXIETY: ICD-10-CM

## 2021-10-25 DIAGNOSIS — Z11.4 SCREENING FOR HIV (HUMAN IMMUNODEFICIENCY VIRUS): ICD-10-CM

## 2021-10-25 DIAGNOSIS — N92.1 MENORRHAGIA WITH IRREGULAR CYCLE: ICD-10-CM

## 2021-10-25 DIAGNOSIS — R00.0 TACHYCARDIA: ICD-10-CM

## 2021-10-25 DIAGNOSIS — E55.9 VITAMIN D DEFICIENCY: ICD-10-CM

## 2021-10-25 DIAGNOSIS — E53.8 VITAMIN B12 DEFICIENCY: ICD-10-CM

## 2021-10-25 DIAGNOSIS — R03.0 ELEVATED BLOOD PRESSURE READING: Primary | ICD-10-CM

## 2021-10-25 DIAGNOSIS — L30.4 INTERTRIGO: ICD-10-CM

## 2021-10-25 DIAGNOSIS — Z23 ENCOUNTER FOR IMMUNIZATION: ICD-10-CM

## 2021-10-25 DIAGNOSIS — R73.03 PREDIABETES: ICD-10-CM

## 2021-10-25 DIAGNOSIS — Z98.84 S/P GASTRIC BYPASS: ICD-10-CM

## 2021-10-25 DIAGNOSIS — F33.3 SEVERE EPISODE OF RECURRENT MAJOR DEPRESSIVE DISORDER, WITH PSYCHOTIC FEATURES (HCC): ICD-10-CM

## 2021-10-25 PROCEDURE — 1036F TOBACCO NON-USER: CPT | Performed by: FAMILY MEDICINE

## 2021-10-25 PROCEDURE — 99214 OFFICE O/P EST MOD 30 MIN: CPT | Performed by: FAMILY MEDICINE

## 2021-10-25 PROCEDURE — 90682 RIV4 VACC RECOMBINANT DNA IM: CPT

## 2021-10-25 PROCEDURE — 90471 IMMUNIZATION ADMIN: CPT

## 2021-10-25 RX ORDER — FLUCONAZOLE 150 MG/1
150 TABLET ORAL DAILY
Qty: 5 TABLET | Refills: 0 | Status: SHIPPED | OUTPATIENT
Start: 2021-10-25 | End: 2021-10-30

## 2021-10-25 RX ORDER — NAFTIFINE HYDROCHLORIDE 1 MG/G
CREAM TOPICAL DAILY
Qty: 90 G | Refills: 5 | Status: CANCELLED | OUTPATIENT
Start: 2021-10-25

## 2021-10-25 RX ORDER — ERGOCALCIFEROL 1.25 MG/1
50000 CAPSULE ORAL
Qty: 30 CAPSULE | Refills: 5 | Status: SHIPPED | OUTPATIENT
Start: 2021-10-25

## 2021-10-25 RX ORDER — METOPROLOL TARTRATE 50 MG/1
50 TABLET, FILM COATED ORAL 2 TIMES DAILY
Qty: 60 TABLET | Refills: 5 | Status: SHIPPED | OUTPATIENT
Start: 2021-10-25 | End: 2022-05-12 | Stop reason: SDUPTHER

## 2021-10-25 RX ORDER — VALACYCLOVIR HYDROCHLORIDE 1 G/1
2000 TABLET, FILM COATED ORAL 2 TIMES DAILY
Qty: 12 TABLET | Refills: 5 | Status: SHIPPED | OUTPATIENT
Start: 2021-10-25 | End: 2022-05-09

## 2021-10-25 RX ORDER — CLONAZEPAM 0.5 MG/1
0.5 TABLET ORAL 4 TIMES DAILY PRN
Start: 2021-10-25 | End: 2022-05-09

## 2021-10-27 ENCOUNTER — APPOINTMENT (OUTPATIENT)
Dept: LAB | Facility: AMBULARY SURGERY CENTER | Age: 51
End: 2021-10-27
Payer: COMMERCIAL

## 2021-10-27 ENCOUNTER — OFFICE VISIT (OUTPATIENT)
Dept: OBGYN CLINIC | Facility: CLINIC | Age: 51
End: 2021-10-27
Payer: COMMERCIAL

## 2021-10-27 VITALS
DIASTOLIC BLOOD PRESSURE: 82 MMHG | BODY MASS INDEX: 44.9 KG/M2 | SYSTOLIC BLOOD PRESSURE: 136 MMHG | WEIGHT: 263 LBS | HEIGHT: 64 IN

## 2021-10-27 DIAGNOSIS — N92.1 MENORRHAGIA WITH IRREGULAR CYCLE: ICD-10-CM

## 2021-10-27 DIAGNOSIS — N92.1 MENOMETRORRHAGIA: Primary | ICD-10-CM

## 2021-10-27 DIAGNOSIS — Z11.4 SCREENING FOR HIV (HUMAN IMMUNODEFICIENCY VIRUS): ICD-10-CM

## 2021-10-27 LAB
BASOPHILS # BLD AUTO: 0.04 THOUSANDS/ΜL (ref 0–0.1)
BASOPHILS NFR BLD AUTO: 1 % (ref 0–1)
EOSINOPHIL # BLD AUTO: 0.03 THOUSAND/ΜL (ref 0–0.61)
EOSINOPHIL NFR BLD AUTO: 1 % (ref 0–6)
ERYTHROCYTE [DISTWIDTH] IN BLOOD BY AUTOMATED COUNT: 13.2 % (ref 11.6–15.1)
FERRITIN SERPL-MCNC: 77 NG/ML (ref 8–388)
HCT VFR BLD AUTO: 40.9 % (ref 34.8–46.1)
HGB BLD-MCNC: 13.4 G/DL (ref 11.5–15.4)
IMM GRANULOCYTES # BLD AUTO: 0.01 THOUSAND/UL (ref 0–0.2)
IMM GRANULOCYTES NFR BLD AUTO: 0 % (ref 0–2)
IRON SATN MFR SERPL: 20 % (ref 15–50)
IRON SERPL-MCNC: 61 UG/DL (ref 50–170)
LYMPHOCYTES # BLD AUTO: 1.8 THOUSANDS/ΜL (ref 0.6–4.47)
LYMPHOCYTES NFR BLD AUTO: 38 % (ref 14–44)
MCH RBC QN AUTO: 29.3 PG (ref 26.8–34.3)
MCHC RBC AUTO-ENTMCNC: 32.8 G/DL (ref 31.4–37.4)
MCV RBC AUTO: 90 FL (ref 82–98)
MONOCYTES # BLD AUTO: 0.38 THOUSAND/ΜL (ref 0.17–1.22)
MONOCYTES NFR BLD AUTO: 8 % (ref 4–12)
NEUTROPHILS # BLD AUTO: 2.51 THOUSANDS/ΜL (ref 1.85–7.62)
NEUTS SEG NFR BLD AUTO: 52 % (ref 43–75)
NRBC BLD AUTO-RTO: 0 /100 WBCS
PLATELET # BLD AUTO: 331 THOUSANDS/UL (ref 149–390)
PMV BLD AUTO: 11 FL (ref 8.9–12.7)
RBC # BLD AUTO: 4.57 MILLION/UL (ref 3.81–5.12)
TIBC SERPL-MCNC: 301 UG/DL (ref 250–450)
WBC # BLD AUTO: 4.77 THOUSAND/UL (ref 4.31–10.16)

## 2021-10-27 PROCEDURE — 83550 IRON BINDING TEST: CPT

## 2021-10-27 PROCEDURE — 36415 COLL VENOUS BLD VENIPUNCTURE: CPT

## 2021-10-27 PROCEDURE — 82728 ASSAY OF FERRITIN: CPT

## 2021-10-27 PROCEDURE — 99203 OFFICE O/P NEW LOW 30 MIN: CPT | Performed by: OBSTETRICS & GYNECOLOGY

## 2021-10-27 PROCEDURE — 87389 HIV-1 AG W/HIV-1&-2 AB AG IA: CPT

## 2021-10-27 PROCEDURE — 83540 ASSAY OF IRON: CPT

## 2021-10-27 PROCEDURE — 85025 COMPLETE CBC W/AUTO DIFF WBC: CPT

## 2021-10-27 PROCEDURE — 3008F BODY MASS INDEX DOCD: CPT | Performed by: FAMILY MEDICINE

## 2021-10-27 RX ORDER — MEDROXYPROGESTERONE ACETATE 10 MG/1
10 TABLET ORAL DAILY
Qty: 10 TABLET | Refills: 3 | Status: SHIPPED | OUTPATIENT
Start: 2021-10-27 | End: 2022-05-09

## 2021-10-29 LAB — HIV 1+2 AB+HIV1 P24 AG SERPL QL IA: NORMAL

## 2021-11-01 ENCOUNTER — PATIENT MESSAGE (OUTPATIENT)
Dept: FAMILY MEDICINE CLINIC | Facility: CLINIC | Age: 51
End: 2021-11-01

## 2021-11-01 DIAGNOSIS — F41.9 ANXIETY: ICD-10-CM

## 2021-11-01 DIAGNOSIS — K21.9 GASTROESOPHAGEAL REFLUX DISEASE: ICD-10-CM

## 2021-11-04 ENCOUNTER — TELEPHONE (OUTPATIENT)
Dept: FAMILY MEDICINE CLINIC | Facility: CLINIC | Age: 51
End: 2021-11-04

## 2021-11-16 ENCOUNTER — HOSPITAL ENCOUNTER (OUTPATIENT)
Dept: ULTRASOUND IMAGING | Facility: HOSPITAL | Age: 51
Discharge: HOME | End: 2021-11-16
Attending: OBSTETRICS & GYNECOLOGY
Payer: COMMERCIAL

## 2021-11-16 DIAGNOSIS — N92.1 MENOMETRORRHAGIA: ICD-10-CM

## 2021-11-16 PROCEDURE — 76856 US EXAM PELVIC COMPLETE: CPT

## 2021-11-16 PROCEDURE — 76830 TRANSVAGINAL US NON-OB: CPT

## 2021-12-01 DIAGNOSIS — G47.419 PRIMARY NARCOLEPSY WITHOUT CATAPLEXY: ICD-10-CM

## 2021-12-01 DIAGNOSIS — G47.411 NARCOLEPSY AND CATAPLEXY: ICD-10-CM

## 2021-12-03 RX ORDER — SODIUM OXYBATE 0.5 G/ML
SOLUTION ORAL
Qty: 540 ML | Refills: 5 | Status: SHIPPED | OUTPATIENT
Start: 2021-12-03 | End: 2022-05-10 | Stop reason: SDUPTHER

## 2021-12-25 DIAGNOSIS — K21.9 GASTROESOPHAGEAL REFLUX DISEASE: ICD-10-CM

## 2021-12-27 DIAGNOSIS — K21.9 GASTROESOPHAGEAL REFLUX DISEASE: ICD-10-CM

## 2021-12-27 RX ORDER — LANSOPRAZOLE 30 MG/1
30 CAPSULE, DELAYED RELEASE ORAL DAILY
Qty: 90 CAPSULE | Refills: 1 | Status: SHIPPED | OUTPATIENT
Start: 2021-12-27 | End: 2022-06-14 | Stop reason: SDUPTHER

## 2021-12-27 RX ORDER — LANSOPRAZOLE 30 MG/1
CAPSULE, DELAYED RELEASE ORAL
Qty: 90 CAPSULE | Refills: 1 | OUTPATIENT
Start: 2021-12-27

## 2022-01-30 ENCOUNTER — HOSPITAL ENCOUNTER (EMERGENCY)
Facility: HOSPITAL | Age: 52
Discharge: HOME | End: 2022-01-30
Attending: EMERGENCY MEDICINE | Admitting: EMERGENCY MEDICINE
Payer: COMMERCIAL

## 2022-01-30 ENCOUNTER — APPOINTMENT (EMERGENCY)
Dept: RADIOLOGY | Facility: HOSPITAL | Age: 52
End: 2022-01-30
Payer: COMMERCIAL

## 2022-01-30 VITALS
SYSTOLIC BLOOD PRESSURE: 179 MMHG | TEMPERATURE: 97.7 F | OXYGEN SATURATION: 98 % | RESPIRATION RATE: 22 BRPM | DIASTOLIC BLOOD PRESSURE: 81 MMHG | HEART RATE: 84 BPM

## 2022-01-30 DIAGNOSIS — S76.312A STRAIN OF LEFT HAMSTRING MUSCLE, INITIAL ENCOUNTER: ICD-10-CM
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
## 2022-01-30 DIAGNOSIS — S70.12XA HEMATOMA OF LEFT THIGH, INITIAL ENCOUNTER: ICD-10-CM

## 2022-01-30 PROCEDURE — 73552 X-RAY EXAM OF FEMUR 2/>: CPT

## 2022-01-30 PROCEDURE — 73590 X-RAY EXAM OF LOWER LEG: CPT

## 2022-01-30 PROCEDURE — 96372 THER/PROPH/DIAG INJ SC/IM: CPT

## 2022-01-30 PROCEDURE — 99284 EMERGENCY DEPT VISIT MOD MDM: CPT | Performed by: EMERGENCY MEDICINE

## 2022-01-30 PROCEDURE — 73564 X-RAY EXAM KNEE 4 OR MORE: CPT

## 2022-01-30 PROCEDURE — 99283 EMERGENCY DEPT VISIT LOW MDM: CPT

## 2022-01-30 PROCEDURE — 72170 X-RAY EXAM OF PELVIS: CPT

## 2022-01-30 RX ORDER — KETOROLAC TROMETHAMINE 30 MG/ML
10 INJECTION, SOLUTION INTRAMUSCULAR; INTRAVENOUS ONCE
Status: COMPLETED | OUTPATIENT
Start: 2022-01-30 | End: 2022-01-30

## 2022-01-30 RX ORDER — LIDOCAINE 50 MG/G
2 PATCH TOPICAL ONCE
Status: DISCONTINUED | OUTPATIENT
Start: 2022-01-30 | End: 2022-01-30 | Stop reason: HOSPADM

## 2022-01-30 RX ADMIN — LIDOCAINE 2 PATCH: 50 PATCH CUTANEOUS at 18:42

## 2022-01-30 RX ADMIN — KETOROLAC TROMETHAMINE 9.9 MG: 30 INJECTION, SOLUTION INTRAMUSCULAR at 18:58

## 2022-01-30 NOTE — ED PROVIDER NOTES
History  Chief Complaint   Patient presents with    Fall     Pt presents to the ED s/p fall x 2 days ago  Reports slipping on some water at her house  54 yr female with 2 falls over last 3 days in which she slipped on wet floor and fell with  Left knee flexing behind her -- pt denies any other injury - c/o left posterior  Thigh pain  With bruising/swelling- pt deneis any head/neck/chest/ abd/ back/ bue injury       History provided by:  Patient and spouse   used: No        Prior to Admission Medications   Prescriptions Last Dose Informant Patient Reported? Taking?    ALPRAZolam (XANAX) 1 mg tablet   Yes No   Sig: Take 1 mg by mouth daily   Albuterol Sulfate (ProAir RespiClick) 683 (90 Base) MCG/ACT AEPB   No No   Sig: Inhale 1 puff every 4 (four) hours as needed (wheezing)   B-D INTEGRA SYRINGE 23G X 1" 3 ML MISC   Yes No   Sig: USE EVERY 30 DAYS   NEEDLE, DISP, 30 G (BD Eclipse Needle) 30G X 1/2" MISC   No No   Sig: Use daily   NEEDLE, DISP, 30 G (BD Eclipse Needle) 30G X 1/2" MISC   No No   Sig: Use every 30 (thirty) days   Sodium Oxybate (Xyrem) 500 MG/ML SOLN   No No   Sig: First dose (bedtime): 4 5 g + Second dose (2 5-4 hrs later): 4 5 g = 9 g Total Nightly Dose [Total Qty: 1 month(s)]   Syringe 1 ML MISC   No No   Sig: Use every 30 (thirty) days   clonazePAM (KlonoPIN) 0 5 mg tablet   No No   Sig: Take 1 tablet (0 5 mg total) by mouth 4 (four) times a day as needed for anxiety (per psych)   clonazePAM (KlonoPIN) 2 mg tablet   No No   Sig: Take 1 tablet (2 mg total) by mouth 2 (two) times a day 1 at 12:00 and 3:30 (in addition to 1 mg dose) per Dr Terri Padilla psychiatrist   Patient taking differently: Take 2 mg by mouth 4 (four) times a day Cut in half   cyanocobalamin 1,000 mcg/mL   No No   Si ml SQ Every 2 weeks with syringe and needles   econazole nitrate 1 % cream   No No   Sig: Apply topically daily   ergocalciferol (VITAMIN D2) 50,000 units   No No   Sig: Take 1 capsule (50,000 Units total) by mouth every 14 (fourteen) days   ferrous sulfate 325 (65 Fe) mg tablet   No No   Sig: Take 1 tablet (325 mg total) by mouth daily with breakfast   gabapentin (NEURONTIN) 300 mg capsule   No No   Sig: Three times a day as needed pain per Dr Keyshawn Erwin psychiatrist   lansoprazole (PREVACID) 30 mg capsule   No No   Sig: Take 1 capsule (30 mg total) by mouth daily   loxapine (LOXITANE) 10 mg capsule   No No   Si in am, 1 in afternoon, 3 before bed - per Dr Keyshawn Erwin   medroxyPROGESTERone (PROVERA) 10 mg tablet   No No   Sig: Take 1 tablet (10 mg total) by mouth daily For 10 days as needed for heavy or prolonged bleeding   metoprolol tartrate (LOPRESSOR) 50 mg tablet   No No   Sig: Take 1 tablet (50 mg total) by mouth 2 (two) times a day   nystatin (MYCOSTATIN) powder   No No   Sig: Apply topically 2 (two) times a day as needed (rash)   valACYclovir (VALTREX) 1,000 mg tablet   No No   Sig: Take 2 tablets (2,000 mg total) by mouth 2 (two) times a day for 1 day   venlafaxine (EFFEXOR) 100 MG tablet   No No   Sig: Take 1 tablet (100 mg total) by mouth 3 (three) times a day      Facility-Administered Medications: None       Past Medical History:   Diagnosis Date    Anxiety     Asthma     childhood    Depression     Narcolepsy     Obesity        Past Surgical History:   Procedure Laterality Date     SECTION      CHOLECYSTECTOMY      KNEE ARTHROSCOPY Left     MELISSA-EN-Y PROCEDURE         Family History   Problem Relation Age of Onset    Hypertension Mother     Depression Mother     Atrial fibrillation Mother     Hypertension Father     Depression Father     Heart failure Father         heart attack in 35s    Diabetes Maternal Grandmother     Stroke Maternal Grandmother     Brain cancer Maternal Grandfather     Lung cancer Paternal Grandfather     No Known Problems Sister     No Known Problems Maternal Aunt     No Known Problems Maternal Aunt     No Known Problems Maternal Aunt  No Known Problems Maternal Aunt     No Known Problems Paternal Aunt     Alcohol abuse Neg Hx     Substance Abuse Neg Hx     Mental illness Neg Hx      I have reviewed and agree with the history as documented  E-Cigarette/Vaping    E-Cigarette Use Never User      E-Cigarette/Vaping Substances    Nicotine No     THC No     CBD No     Flavoring No      Social History     Tobacco Use    Smoking status: Never Smoker    Smokeless tobacco: Never Used   Vaping Use    Vaping Use: Never used   Substance Use Topics    Alcohol use: Not Currently    Drug use: No       Review of Systems   Constitutional: Negative  HENT: Negative  Eyes: Negative  Respiratory: Negative  Cardiovascular: Negative  Gastrointestinal: Negative  Endocrine: Negative  Genitourinary: Negative  Musculoskeletal: Negative  Skin: Negative  Allergic/Immunologic: Negative  Neurological: Negative  Hematological: Negative  Psychiatric/Behavioral: Negative  Physical Exam  Physical Exam  Vitals and nursing note reviewed  Constitutional:       General: She is not in acute distress  Appearance: Normal appearance  She is not ill-appearing, toxic-appearing or diaphoretic  Comments: vss- thnsive- pulse ox  98 % on ra- interpretation is normal- no intervention    HENT:      Head: Normocephalic and atraumatic  Nose: Nose normal       Mouth/Throat:      Mouth: Mucous membranes are moist    Eyes:      General: No scleral icterus  Right eye: No discharge  Left eye: No discharge  Extraocular Movements: Extraocular movements intact  Conjunctiva/sclera: Conjunctivae normal       Pupils: Pupils are equal, round, and reactive to light  Comments: Mm pink   Neck:      Vascular: No carotid bruit  Comments: No pmt c/t/l/s spine   Cardiovascular:      Rate and Rhythm: Normal rate and regular rhythm  Pulses: Normal pulses  Heart sounds: Normal heart sounds   No murmur heard  No friction rub  No gallop  Pulmonary:      Effort: Pulmonary effort is normal  No respiratory distress  Breath sounds: Normal breath sounds  No stridor  No wheezing, rhonchi or rales  Chest:      Chest wall: No tenderness  Abdominal:      General: Bowel sounds are normal  There is no distension  Palpations: Abdomen is soft  There is no mass  Tenderness: There is no abdominal tenderness  There is no right CVA tenderness, left CVA tenderness, guarding or rebound  Hernia: No hernia is present  Comments: Soft nt/nd- no hsm- no cva tenderness/ no ascites/ no peritoneal signs    Musculoskeletal:         General: Swelling, tenderness and signs of injury present  No deformity  Normal range of motion  Cervical back: Normal range of motion and neck supple  No rigidity or tenderness  Right lower leg: No edema  Left lower leg: No edema  Comments: lle- pt mild tenderness at iliac crest  Area to palpation -- left anterior thigh- nt- pos left proximal hamstring area  With palm sized area of ecchymosis  With overly;ing tenderness--  Medial distal hamstring linear area of echymosis with tenderness- medial hamstring tenderness to palpation but normal flextion at knee-- normal knee extension - normal quad and patellar  tendon - no lig laxity - no effusions- nt at lle- ankle-  Normal achilles tendon function -  nt at foot- normal distal pulse-sensation cap refill/rom strength distally of lle    Lymphadenopathy:      Cervical: No cervical adenopathy  Skin:     General: Skin is warm  Capillary Refill: Capillary refill takes less than 2 seconds  Coloration: Skin is not jaundiced or pale  Findings: No bruising, erythema, lesion or rash  Neurological:      General: No focal deficit present  Mental Status: She is alert and oriented to person, place, and time  Mental status is at baseline  Cranial Nerves: No cranial nerve deficit        Sensory: No sensory deficit  Motor: No weakness  Coordination: Coordination normal       Gait: Gait normal    Psychiatric:         Mood and Affect: Mood normal          Behavior: Behavior normal          Thought Content: Thought content normal          Judgment: Judgment normal          Vital Signs  ED Triage Vitals [01/30/22 1822]   Temp Pulse Respirations Blood Pressure SpO2   -- 84 22 (!) 179/81 98 %      Temp src Heart Rate Source Patient Position - Orthostatic VS BP Location FiO2 (%)   -- Monitor Sitting Right arm --      Pain Score       --           Vitals:    01/30/22 1822   BP: (!) 179/81   Pulse: 84   Patient Position - Orthostatic VS: Sitting         Visual Acuity      ED Medications  Medications   ketorolac (TORADOL) injection 9 9 mg (has no administration in time range)   lidocaine (LIDODERM) 5 % patch 2 patch (has no administration in time range)       Diagnostic Studies  Results Reviewed     None                 XR hip/pelv 2-3 vws left    (Results Pending)   XR femur 2 views LEFT    (Results Pending)   XR knee 4+ views left injury    (Results Pending)              Procedures  Procedures         ED Course  ED Course as of 01/30/22 2039   Lazarus  Jan 30, 2022   1834 Er md note- pt states only thing that she can  take for pain is toradol- requesting toradol- will order    2010 PELVIS XRAY / LEFT HIP/FEMUR/ KNEE / TIB/ FIB XRAY- NO FX- NO SQ AIR    2028 - ER MD NOTE- PT AND  MADE AWARE OF XRAY FINDINGS- PT ABLE TO TOLERATE AMBULATION IN ER WITH WALKER  WITH NO PROBLEMS- PT HAS A WALKER AT HOME --                                              MDM    Disposition  Final diagnoses:   None     ED Disposition     None      Follow-up Information    None         Patient's Medications   Discharge Prescriptions    No medications on file       No discharge procedures on file      PDMP Review     None          ED Provider  Electronically Signed by           Tonia Torres MD  01/30/22 2039

## 2022-01-31 ENCOUNTER — TELEPHONE (OUTPATIENT)
Dept: FAMILY MEDICINE CLINIC | Facility: CLINIC | Age: 52
End: 2022-01-31

## 2022-01-31 NOTE — DISCHARGE INSTRUCTIONS
DIAGNOSIS:  FALL/ LEFT HAMSTRING MUSCLE STRAIN  AND LIKELY  MUSCLE FIBER  TEAR  WITH HEMATOMA- BLOOD UNDER SKIN        - ACTIVITY AS TOLERATED     - THE LIDODERM PATCH ES CAN REMAIN ON FOR UP TO 12 HRS AT A TIME - CAN NOT GET WARM OR WET - CAN USE OVER THE COUNTER LIDOCAINE PATCHES TO AREA  - WHEN  LIDOCAINE PATCHES ARE OFF WOULD USE  HEATING PAD TO AREAS- BE CAREFUL TO NOT BURN SKIN     - FOR PAIN- WOULD STICK TO OVER THE COUNTER TYLENOL 500 MG EVERY 4 HRS     - USE YOUR WALKER AS NEEDED       -  THE BODY WILL REABSORB THE  HEMATOMA OVER THE NEXT MONTH     - IF NO IMPROVEMENT AFTER 1 WEEK- PLEASE CALL [facility] TO SCHEDULE AN APPOINTMENT

## 2022-01-31 NOTE — ED NOTES
Pt able to ambulate around room with walker  Steady, gait        Katherine Camarena RN  01/30/22 2028

## 2022-01-31 NOTE — TELEPHONE ENCOUNTER
Due to her gastric bypass and GERD she should avoid nsaids  I recommend she use ice to the area - 20 min on, 20 minutes to help with the pain  I suspect it will take time for this to improve, mayvbe 2 weeks   She can also try over the counter salon pas patch

## 2022-01-31 NOTE — TELEPHONE ENCOUNTER
Pt  called regarding pt ED visit - she is in a lot of pain, she received Toradol at the ED but no meds to take home to help with the pain  They would like to be Rx pain meds  Informed them they would need to be seen prior to any Rx -  stated she is in excruciating pain and would not like to wait for tomorrow, informed him there are no more open slots for today and if pain is worsening to go to ED or urgent care  Please advise

## 2022-01-31 NOTE — TELEPHONE ENCOUNTER
Pt called and left message stating she was seen in the ED yesterday for partial tear of hamstring, she would like to know if you have any suggestions for pain management

## 2022-02-02 ENCOUNTER — TELEPHONE (OUTPATIENT)
Dept: OBGYN CLINIC | Facility: HOSPITAL | Age: 52
End: 2022-02-02

## 2022-02-02 NOTE — TELEPHONE ENCOUNTER
I received a Care Request from patient to schedule for:  Where Does it Hurt? Left leg  Are you considering joint replacement? No   Are you seeking a second opinion? No  If yes, who is your doctor? I lvm to cb to schedule

## 2022-02-03 ENCOUNTER — OFFICE VISIT (OUTPATIENT)
Dept: OBGYN CLINIC | Facility: HOSPITAL | Age: 52
End: 2022-02-03
Payer: COMMERCIAL

## 2022-02-03 VITALS — WEIGHT: 263 LBS | BODY MASS INDEX: 44.9 KG/M2 | HEIGHT: 64 IN

## 2022-02-03 DIAGNOSIS — S76.312A TEAR OF LEFT HAMSTRING, INITIAL ENCOUNTER: Primary | ICD-10-CM

## 2022-02-03 PROCEDURE — 99243 OFF/OP CNSLTJ NEW/EST LOW 30: CPT | Performed by: PHYSICIAN ASSISTANT

## 2022-02-03 PROCEDURE — 1036F TOBACCO NON-USER: CPT | Performed by: PHYSICIAN ASSISTANT

## 2022-02-03 PROCEDURE — 3008F BODY MASS INDEX DOCD: CPT | Performed by: PHYSICIAN ASSISTANT

## 2022-02-03 NOTE — PROGRESS NOTES
Assessment:    Left hamstring strain/tear      Plan:    Rest as much as able, ice to the posterior thigh  Protected weight bearing , doesn't want crutches due to her weight  Feels like she would be at increased risk for fall  Continue NSAIDs and Tylenol PRN  F/U 4 weeks for re-evaluation  May need PT in the future        Subjective:     Patient ID:  Gustavo Gordon is a 54 y o  female    HPI    72-year-old female presenting for evaluation of her posterior thigh  According to the patient, over a week ago she slipped on ice and felt like she tore a pop something in her posterior thigh muscles  She does not feel like she has any bone pain more so muscle related  Pain is localized to the posterior buttock and radiates to the hind the knee  Most of her pain is behind the knee it hurts whenever she puts pressure down on this area no associated numbness tingling  The following portions of the patient's history were reviewed and updated as appropriate: allergies, current medications, past family history, past medical history, past social history, past surgical history and problem list     Review of Systems     Objective:    Imaging:  Left femur x-rays 1/30/2022  VIEWS:  XR FEMUR 2 VW LEFT   Images: 2     FINDINGS: *Study is interpreted in conjunction with left knee series also on this date      There is no acute fracture or dislocation      No significant degenerative changes      No lytic or blastic osseous lesion      Soft tissues are unremarkable      IMPRESSION:     No acute osseous abnormality      Left Tib/fib  VIEWS:  XR TIBIA FIBULA 2 VW LEFT   Images: 2     FINDINGS: *The study is interpreted in conjunction with left knee radiograph also on this date      There is no acute fracture or dislocation      No significant degenerative changes  Plantar calcaneal spur      No lytic or blastic osseous lesion      Soft tissues are unremarkable      IMPRESSION:     No acute osseous abnormality      Left knee VIEWS:  XR KNEE 4+ VW LEFT INJURY      FINDINGS:     There is no acute fracture or dislocation      There is no joint effusion      No significant degenerative changes      No lytic or blastic osseous lesion      Soft tissues are unremarkable      IMPRESSION:     No acute osseous abnormality  AP Pelvis  VIEWS:  XR PELVIS AP ONLY 1 VW      FINDINGS:     No acute fracture or hip dislocation        No significant degenerative changes      No lytic or blastic osseous lesion      Soft tissues are unremarkable       The visualized lumbar spine is unremarkable      IMPRESSION:     No acute osseous abnormality  Vitals:           Physical Exam     Orthopedic Examination:  Left lower extremity    Inspection:  There is ecchymosis located posterior thigh below the buttock region  Palpation:  Hamstrings are tender to palpation  No tenderness quadriceps patellar tendon  No palpable gap quadriceps tendon    Range-of-motion:    Strength:   Motor and sensory stable hip flexion knee extension ankle plantar dorsiflexion      Special Tests:  Extensor mechanism intact

## 2022-02-03 NOTE — LETTER
February 4, 2022     Harley Arrieta DO [address] [address]  [address]  [address] [place] [address]    Patient: Del Carpenter   YOB: 1970   Date of Visit: 2/3/2022       Dear Dr Gayle Bass: Thank you for referring Nyra Elders to me for evaluation  Below are my notes for this consultation  If you have questions, please do not hesitate to call me  I look forward to following your patient along with you  Sincerely,        Holly Davis PA-C        CC: No Recipients  Janel Logan  2/3/2022  2:06 PM  Attested  Assessment:    Left hamstring strain/tear      Plan:    Rest as much as able, ice to the posterior thigh  Protected weight bearing , doesn't want crutches due to her weight  Feels like she would be at increased risk for fall  Continue NSAIDs and Tylenol PRN  F/U 4 weeks for re-evaluation  May need PT in the future        Subjective:     Patient ID:  Del Carpenter is a 54 y o  female    HPI    46-year-old female presenting for evaluation of her posterior thigh  According to the patient, over a week ago she slipped on ice and felt like she tore a pop something in her posterior thigh muscles  She does not feel like she has any bone pain more so muscle related  Pain is localized to the posterior buttock and radiates to the hind the knee  Most of her pain is behind the knee it hurts whenever she puts pressure down on this area no associated numbness tingling      The following portions of the patient's history were reviewed and updated as appropriate: allergies, current medications, past family history, past medical history, past social history, past surgical history and problem list     Review of Systems     Objective:    Imaging:  Left femur x-rays 1/30/2022  VIEWS:  XR FEMUR 2 VW LEFT   Images: 2     FINDINGS: *Study is interpreted in conjunction with left knee series also on this date      There is no acute fracture or dislocation      No significant degenerative changes      No lytic or blastic osseous lesion      Soft tissues are unremarkable      IMPRESSION:     No acute osseous abnormality      Left Tib/fib  VIEWS:  XR TIBIA FIBULA 2 VW LEFT   Images: 2     FINDINGS: *The study is interpreted in conjunction with left knee radiograph also on this date      There is no acute fracture or dislocation      No significant degenerative changes  Plantar calcaneal spur      No lytic or blastic osseous lesion      Soft tissues are unremarkable      IMPRESSION:     No acute osseous abnormality  Left knee   VIEWS:  XR KNEE 4+ VW LEFT INJURY      FINDINGS:     There is no acute fracture or dislocation      There is no joint effusion      No significant degenerative changes      No lytic or blastic osseous lesion      Soft tissues are unremarkable      IMPRESSION:     No acute osseous abnormality  AP Pelvis  VIEWS:  XR PELVIS AP ONLY 1 VW      FINDINGS:     No acute fracture or hip dislocation        No significant degenerative changes      No lytic or blastic osseous lesion      Soft tissues are unremarkable       The visualized lumbar spine is unremarkable      IMPRESSION:     No acute osseous abnormality  Vitals:           Physical Exam     Orthopedic Examination:  Left lower extremity    Inspection:  There is ecchymosis located posterior thigh below the buttock region  Palpation:  Hamstrings are tender to palpation  No tenderness quadriceps patellar tendon  No palpable gap quadriceps tendon    Range-of-motion:    Strength: Motor and sensory stable hip flexion knee extension ankle plantar dorsiflexion      Special Tests:  Extensor mechanism intact    Attestation signed by Ada Ruelas MD at 2/3/2022  4:18 PM:    The patient was seen by the advanced practitioner  I was available by telephone as needed  I am attesting this note as a supervising physician but did not see or examine the patient

## 2022-03-01 ENCOUNTER — OFFICE VISIT (OUTPATIENT)
Dept: SLEEP CENTER | Facility: CLINIC | Age: 52
End: 2022-03-01
Payer: COMMERCIAL

## 2022-03-01 VITALS
DIASTOLIC BLOOD PRESSURE: 78 MMHG | BODY MASS INDEX: 47.97 KG/M2 | WEIGHT: 281 LBS | SYSTOLIC BLOOD PRESSURE: 130 MMHG | HEIGHT: 64 IN

## 2022-03-01 DIAGNOSIS — G47.419 PRIMARY NARCOLEPSY WITHOUT CATAPLEXY: Primary | ICD-10-CM

## 2022-03-01 PROCEDURE — 99213 OFFICE O/P EST LOW 20 MIN: CPT | Performed by: INTERNAL MEDICINE

## 2022-03-01 NOTE — PROGRESS NOTES
Progress Note - [facility]   Candice Ruiz MRN: [id number]      Reason for Visit:    54 y  o female presents for follow up    Assessment:  Primary Narcolepsy with cataplexy     Plan:  Continue Xyrem    Follow up:  12 months    History of Present Illness:  [de-identified] year old female is seen today in office  She is AAO X 3 and in no apparent distress  Patient is ambulating with use of walker due to a "torn hamstring " She states that her current medication regimen is controlling her narcolepsy well   She states she takes one dose at bedtime which lasts for 2 5 hours at which pint she wakes up and takes a sec    Review of Systems      Genitourinary none   Cardiology none   Gastrointestinal none   Neurology none   Constitutional none   Integumentary none   Psychiatry none   Musculoskeletal none   Pulmonary none   ENT none   Endocrine none   Hematological none           I have reviewed and updated the review of systems as necessary     Historical Information    Past Medical History:   Diagnosis Date    Anxiety     Asthma     childhood    Depression     Narcolepsy     Obesity          Past Surgical History:   Procedure Laterality Date     SECTION      CHOLECYSTECTOMY      KNEE ARTHROSCOPY Left     MELISSA-EN-Y PROCEDURE           Social History     Socioeconomic History    Marital status: /Civil Union     Spouse name: Not on file    Number of children: 3    Years of education: Not on file    Highest education level: Not on file   Occupational History    Occupation: DISABILITY    Tobacco Use    Smoking status: Never Smoker    Smokeless tobacco: Never Used   Vaping Use    Vaping Use: Never used   Substance and Sexual Activity    Alcohol use: Not Currently    Drug use: No    Sexual activity: Yes     Partners: Male     Birth control/protection: Female Sterilization   Other Topics Concern    Not on file   Social History Narrative    Drinks ice tea 3-4 per day     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    3 children 19,16 and 15 (adopted)         Social Determinants of Health     Financial Resource Strain: Not on file   Food Insecurity: Not on file   Transportation Needs: Not on file   Physical Activity: Not on file   Stress: Not on file   Social Connections: Not on file   Intimate Partner Violence: Not on file   Housing Stability: Not on file           History   Alcohol use: Not on file       History   Smoking Status    Not on file   Smokeless Tobacco    Not on file       Family History:   Family History   Problem Relation Age of Onset    Hypertension Mother     Depression Mother     Atrial fibrillation Mother     Hypertension Father     Depression Father     Heart failure Father         heart attack in 35s    Diabetes Maternal Grandmother     Stroke Maternal Grandmother     Brain cancer Maternal Grandfather     Lung cancer Paternal Grandfather     No Known Problems Sister     No Known Problems Maternal Aunt     No Known Problems Maternal Aunt     No Known Problems Maternal Aunt     No Known Problems Maternal Aunt     No Known Problems Paternal Aunt     Alcohol abuse Neg Hx     Substance Abuse Neg Hx     Mental illness Neg Hx        Medications/Allergies:      Current Outpatient Medications:     Albuterol Sulfate (ProAir RespiClick) 501 (90 Base) MCG/ACT AEPB, Inhale 1 puff every 4 (four) hours as needed (wheezing), Disp: 1 each, Rfl: 1    cyanocobalamin 1,000 mcg/mL, 1 ml SQ Every 2 weeks with syringe and needles, Disp: 30 mL, Rfl: 11    econazole nitrate 1 % cream, Apply topically daily, Disp: 85 g, Rfl: 1    ergocalciferol (VITAMIN D2) 50,000 units, Take 1 capsule (50,000 Units total) by mouth every 14 (fourteen) days, Disp: 30 capsule, Rfl: 5    ferrous sulfate 325 (65 Fe) mg tablet, Take 1 tablet (325 mg total) by mouth daily with breakfast, Disp: 180 tablet, Rfl: 1    lansoprazole (PREVACID) 30 mg capsule, Take 1 capsule (30 mg total) by mouth daily, Disp: 90 capsule, Rfl: 1    loxapine (LOXITANE) 10 mg capsule, 1 in am, 1 in afternoon, 3 before bed - per Dr Mateo Mitchell, Disp: , Rfl:     metoprolol tartrate (LOPRESSOR) 50 mg tablet, Take 1 tablet (50 mg total) by mouth 2 (two) times a day, Disp: 60 tablet, Rfl: 5    NEEDLE, DISP, 30 G (BD Eclipse Needle) 30G X 1/2" MISC, Use daily, Disp: 30 each, Rfl: 1    Sodium Oxybate (Xyrem) 500 MG/ML SOLN, First dose (bedtime): 4 5 g + Second dose (2 5-4 hrs later): 4 5 g = 9 g Total Nightly Dose [Total Qty: 1 month(s)], Disp: 540 mL, Rfl: 5    venlafaxine (EFFEXOR) 100 MG tablet, Take 1 tablet (100 mg total) by mouth 3 (three) times a day, Disp: 90 tablet, Rfl: 0    ALPRAZolam (XANAX) 1 mg tablet, Take 1 mg by mouth daily, Disp: , Rfl:     B-D INTEGRA SYRINGE 23G X 1" 3 ML MISC, USE EVERY 30 DAYS, Disp: , Rfl:     clonazePAM (KlonoPIN) 0 5 mg tablet, Take 1 tablet (0 5 mg total) by mouth 4 (four) times a day as needed for anxiety (per psych), Disp: , Rfl:     clonazePAM (KlonoPIN) 2 mg tablet, Take 1 tablet (2 mg total) by mouth 2 (two) times a day 1 at 12:00 and 3:30 (in addition to 1 mg dose) per Dr Mateo Mitchell psychiatrist (Patient taking differently: Take 2 mg by mouth 4 (four) times a day Cut in half), Disp: , Rfl: 0    gabapentin (NEURONTIN) 300 mg capsule, Three times a day as needed pain per Dr Mateo Mitchell psychiatrist, Disp: 180 capsule, Rfl: 1    medroxyPROGESTERone (PROVERA) 10 mg tablet, Take 1 tablet (10 mg total) by mouth daily For 10 days as needed for heavy or prolonged bleeding, Disp: 10 tablet, Rfl: 3    NEEDLE, DISP, 30 G (BD Eclipse Needle) 30G X 1/2" MISC, Use every 30 (thirty) days, Disp: 4 each, Rfl: 5    nystatin (MYCOSTATIN) powder, Apply topically 2 (two) times a day as needed (rash), Disp: 60 g, Rfl: 2    Syringe 1 ML MISC, Use every 30 (thirty) days, Disp: 12 each, Rfl: 0    valACYclovir (VALTREX) 1,000 mg tablet, Take 2 tablets (2,000 mg total) by mouth 2 (two) times a day for 1 day, Disp: 12 tablet, Rfl: 5      Objective    Vital Signs:   Vitals:    03/01/22 1301   BP: 130/78   Weight: 127 kg (281 lb)   Height: 5' 4" (1 626 m)     [place] Sleepiness Scale: Total score: 9    Physical Exam:    General: Alert, appropriate, cooperative, overweight    Head: NC/AT    Skin: Warm, dry    Neuro: No motor abnormalities, cranial nerves appear intact    Psych: Normal affect            PARUL Torres    Board Certified Sleep Specialist

## 2022-03-03 ENCOUNTER — OFFICE VISIT (OUTPATIENT)
Dept: OBGYN CLINIC | Facility: CLINIC | Age: 52
End: 2022-03-03
Payer: COMMERCIAL

## 2022-03-03 VITALS
HEART RATE: 72 BPM | WEIGHT: 280 LBS | HEIGHT: 64 IN | BODY MASS INDEX: 47.8 KG/M2 | SYSTOLIC BLOOD PRESSURE: 138 MMHG | DIASTOLIC BLOOD PRESSURE: 83 MMHG

## 2022-03-03 DIAGNOSIS — S76.312A TEAR OF LEFT HAMSTRING, INITIAL ENCOUNTER: Primary | ICD-10-CM

## 2022-03-03 PROCEDURE — 99213 OFFICE O/P EST LOW 20 MIN: CPT | Performed by: ORTHOPAEDIC SURGERY

## 2022-03-03 PROCEDURE — 1036F TOBACCO NON-USER: CPT | Performed by: ORTHOPAEDIC SURGERY

## 2022-03-03 NOTE — PROGRESS NOTES
Chief Complaint:  Left posterior thigh pain    HPI:    Rawland Nissen is a 54year old Female who presents today for evaluation of left posterior thigh pain      Description of symptoms:  Patient reports that she accidentally slipped on ice and fell down with hyperextension of her left knee  This incident happened nearly 5 weeks ago  She felt a tearing sensation in her left posterior thigh with difficulty in ambulation thereafter  She was seen by orthopedic JC Ceja on 2/3/2022  She was diagnosed to have a left hamstring tear  She has been using a walker to help with her ambulation but has not yet started doing any physical therapy  At this time, she feels that the left posterior thigh pain is persistent but slightly better  She is able to weightbear and ambulate with some discomfort  Denies any new injury  Does report some radiation of the pain down to her leg as well  I have personally reviewed pertinent films in PACS and my interpretation is Plain radiograph of the left hip, left femur and left tibia and fibula were reviewed from 1/30/2022  These did not reveal any acute osseous abnormality       Patient Active Problem List   Diagnosis    Bicipital tenosynovitis    Bursitis of shoulder    Arthralgia    Primary narcolepsy with cataplexy    Anxiety    S/P gastric bypass    GERD (gastroesophageal reflux disease)    Seasonal allergic rhinitis due to pollen    Mild intermittent asthma without complication    Migraine with aura and without status migrainosus, not intractable    Anemia    Vitamin B12 deficiency    Iron deficiency anemia    External hemorrhoid    Vitamin D deficiency    Prediabetes    Severe episode of recurrent major depressive disorder, with psychotic features (HCC)    Class 3 severe obesity due to excess calories with serious comorbidity in adult (HCC)    Elevated alkaline phosphatase level    Low ferritin    Hypercholesteremia    Tear of left isabela        Current Outpatient Medications on File Prior to Visit   Medication Sig Dispense Refill    Albuterol Sulfate (ProAir RespiClick) 131 (90 Base) MCG/ACT AEPB Inhale 1 puff every 4 (four) hours as needed (wheezing) 1 each 1    cyanocobalamin 1,000 mcg/mL 1 ml SQ Every 2 weeks with syringe and needles 30 mL 11    econazole nitrate 1 % cream Apply topically daily 85 g 1    ergocalciferol (VITAMIN D2) 50,000 units Take 1 capsule (50,000 Units total) by mouth every 14 (fourteen) days 30 capsule 5    ferrous sulfate 325 (65 Fe) mg tablet Take 1 tablet (325 mg total) by mouth daily with breakfast 180 tablet 1    lansoprazole (PREVACID) 30 mg capsule Take 1 capsule (30 mg total) by mouth daily 90 capsule 1    loxapine (LOXITANE) 10 mg capsule 1 in am, 1 in afternoon, 3 before bed - per Dr Linsey Rodriguez metoprolol tartrate (LOPRESSOR) 50 mg tablet Take 1 tablet (50 mg total) by mouth 2 (two) times a day 60 tablet 5    NEEDLE, DISP, 30 G (BD Eclipse Needle) 30G X 1/2" MISC Use daily 30 each 1    Sodium Oxybate (Xyrem) 500 MG/ML SOLN First dose (bedtime): 4 5 g + Second dose (2 5-4 hrs later): 4 5 g = 9 g Total Nightly Dose [Total Qty: 1 month(s)] 540 mL 5    venlafaxine (EFFEXOR) 100 MG tablet Take 1 tablet (100 mg total) by mouth 3 (three) times a day 90 tablet 0    ALPRAZolam (XANAX) 1 mg tablet Take 1 mg by mouth daily      B-D INTEGRA SYRINGE 23G X 1" 3 ML MISC USE EVERY 30 DAYS      clonazePAM (KlonoPIN) 0 5 mg tablet Take 1 tablet (0 5 mg total) by mouth 4 (four) times a day as needed for anxiety (per psych)      clonazePAM (KlonoPIN) 2 mg tablet Take 1 tablet (2 mg total) by mouth 2 (two) times a day 1 at 12:00 and 3:30 (in addition to 1 mg dose) per Dr Farida Aparicio psychiatrist (Patient taking differently: Take 2 mg by mouth 4 (four) times a day Cut in half)  0    gabapentin (NEURONTIN) 300 mg capsule Three times a day as needed pain per Dr Farida Aparicio psychiatrist 180 capsule 1    medroxyPROGESTERone (PROVERA) 10 mg tablet Take 1 tablet (10 mg total) by mouth daily For 10 days as needed for heavy or prolonged bleeding 10 tablet 3    NEEDLE, DISP, 30 G (BD Eclipse Needle) 30G X 1/2" MISC Use every 30 (thirty) days 4 each 5    nystatin (MYCOSTATIN) powder Apply topically 2 (two) times a day as needed (rash) 60 g 2    Syringe 1 ML MISC Use every 30 (thirty) days 12 each 0    valACYclovir (VALTREX) 1,000 mg tablet Take 2 tablets (2,000 mg total) by mouth 2 (two) times a day for 1 day 12 tablet 5     No current facility-administered medications on file prior to visit  Allergies   Allergen Reactions    Codeine GI Intolerance     heartburn    Other      "ALL PAIN MEDICATIONS"    Morphine Itching and Rash        Tobacco Use: Low Risk     Smoking Tobacco Use: Never Smoker    Smokeless Tobacco Use: Never Used        Social Determinants of Health     Tobacco Use: Low Risk     Smoking Tobacco Use: Never Smoker    Smokeless Tobacco Use: Never Used   Alcohol Use: Not on file   Financial Resource Strain: Not on file   Food Insecurity: Not on file   Transportation Needs: Not on file   Physical Activity: Not on file   Stress: Not on file   Social Connections: Not on file   Intimate Partner Violence: Not on file   Depression: At risk    PHQ-2 Score: 4   Housing Stability: Not on file               Review of Systems     Body mass index is 48 06 kg/m²  Physical Exam  Vitals and nursing note reviewed  Constitutional:       General: She is not in acute distress  Appearance: She is well-developed  HENT:      Head: Normocephalic and atraumatic  Eyes:      Conjunctiva/sclera: Conjunctivae normal    Cardiovascular:      Rate and Rhythm: Normal rate and regular rhythm  Heart sounds: No murmur heard  Pulmonary:      Effort: Pulmonary effort is normal  No respiratory distress  Breath sounds: Normal breath sounds  Abdominal:      Palpations: Abdomen is soft  Tenderness: There is no abdominal tenderness  Musculoskeletal:      Cervical back: Neck supple  Skin:     General: Skin is warm and dry  Neurological:      Mental Status: She is alert  Ortho Exam:    Body part: left thigh    Inspection:  Mild swelling of the left thigh  Palpation:  Tender to palpation over the medial hamstring belly    Range of motion:  Able to actively flex and extend the left knee but has discomfort with flexion    Special Tests:  Patient is able to weightbear and ambulate using a walker  Left knee flexion strength is 4/5 with pain  Distal Neurovascular Status: Intact, Yes    Procedures       Assessment:     Diagnosis ICD-10-CM Associated Orders   1  Tear of left hamstring, initial encounter  [facility] Ambulatory Referral to Physical Therapy        Plan:    Explained my current clinical findings to Lisa Enciso today  I recommend her to initiate physical therapy rehabilitation to help improve her ambulation and left hamstring strength  In this regard referred to physical therapy is being made  She may also consider doing some local ice application for posterior thigh discomfort  At night, she could consider using a small pillow on her left knee for comfort while sleeping  Follow-Up:    6 weeks        Portions of the record may have been created with voice recognition software  Occasional wrong word or "sound alike" substitutions may have occurred due to the inherent limitations of voice recognition software  Please review the chart carefully and recognize, using context, where substitutions/typographical errors may have occurred

## 2022-03-14 ENCOUNTER — EVALUATION (OUTPATIENT)
Dept: PHYSICAL THERAPY | Facility: CLINIC | Age: 52
End: 2022-03-14
Payer: COMMERCIAL

## 2022-03-14 DIAGNOSIS — S76.312A TEAR OF LEFT HAMSTRING, INITIAL ENCOUNTER: Primary | ICD-10-CM

## 2022-03-14 PROCEDURE — 97161 PT EVAL LOW COMPLEX 20 MIN: CPT | Performed by: PHYSICAL THERAPIST

## 2022-03-14 PROCEDURE — 97140 MANUAL THERAPY 1/> REGIONS: CPT | Performed by: PHYSICAL THERAPIST

## 2022-03-14 PROCEDURE — 97110 THERAPEUTIC EXERCISES: CPT | Performed by: PHYSICAL THERAPIST

## 2022-03-16 ENCOUNTER — ANNUAL EXAM (OUTPATIENT)
Dept: OBGYN CLINIC | Facility: CLINIC | Age: 52
End: 2022-03-16
Payer: COMMERCIAL

## 2022-03-16 VITALS
HEIGHT: 64 IN | SYSTOLIC BLOOD PRESSURE: 138 MMHG | BODY MASS INDEX: 48.32 KG/M2 | DIASTOLIC BLOOD PRESSURE: 80 MMHG | WEIGHT: 283 LBS

## 2022-03-16 DIAGNOSIS — Z01.419 ENCOUNTER FOR GYNECOLOGICAL EXAMINATION WITHOUT ABNORMAL FINDING: Primary | ICD-10-CM

## 2022-03-16 DIAGNOSIS — Z12.31 ENCOUNTER FOR SCREENING MAMMOGRAM FOR MALIGNANT NEOPLASM OF BREAST: ICD-10-CM

## 2022-03-16 PROCEDURE — G0476 HPV COMBO ASSAY CA SCREEN: HCPCS | Performed by: OBSTETRICS & GYNECOLOGY

## 2022-03-16 PROCEDURE — S0612 ANNUAL GYNECOLOGICAL EXAMINA: HCPCS | Performed by: OBSTETRICS & GYNECOLOGY

## 2022-03-16 PROCEDURE — G0145 SCR C/V CYTO,THINLAYER,RESCR: HCPCS | Performed by: OBSTETRICS & GYNECOLOGY

## 2022-03-16 PROCEDURE — 3008F BODY MASS INDEX DOCD: CPT | Performed by: ORTHOPAEDIC SURGERY

## 2022-03-16 NOTE — PROGRESS NOTES
Assessment/Plan:  65-year-old  here for annual   Patient is perimenopausal last menses was last October  Patient had thin prep with Co testing performed, given slip for mammogram for September, return in 1 year or sooner as needed    Subjective:      Patient ID: Tan Nolan is a 54 y o  female  HPI   Tan Nolan is a 65-year-old  here for annual exam   Patient reports no menses since October  Patient reports no intermenstrual bleeding, discharge or other concerns  She is having hot flashes and night sweats, no pain or problems with intercourse, no dryness, sleeping is off and on sometimes good sometimes not as well  Discussed reviewed 1 year with no menses would be menopause  Patient does mention some frequent voiding her symptoms are consistent with possible detrusor instability symptoms  We discussed evaluation with Urogynecology  We discussed possible treatments including but not limited to medication, biofeedback and pelvic stimulation, physical therapy and timed voids  The following portions of the patient's history were reviewed and updated as appropriate: allergies, current medications, past family history, past medical history, past social history, past surgical history and problem list     Past Medical History:   Diagnosis Date    Anxiety     Asthma     childhood    Depression     Narcolepsy     Obesity      Past Surgical History:   Procedure Laterality Date     SECTION      CHOLECYSTECTOMY      KNEE ARTHROSCOPY Left     MELISSA-EN-Y PROCEDURE       OB History        2    Para   2    Term   1       1    AB        Living   2       SAB        IAB        Ectopic        Multiple        Live Births   2                   Review of Systems   Constitutional: Negative  Negative for chills and fever  HENT: Negative  Negative for ear pain and sore throat  Eyes: Negative  Negative for pain and visual disturbance  Respiratory: Negative  Negative for cough and shortness of breath  Cardiovascular: Negative  Negative for chest pain and palpitations  Gastrointestinal: Negative  Negative for abdominal pain and vomiting  Endocrine: Negative  Genitourinary: Positive for decreased urine volume and frequency  Negative for dysuria and hematuria  Musculoskeletal: Negative  Negative for arthralgias and back pain  Skin: Negative  Negative for color change and rash  Neurological: Negative  Negative for seizures and syncope  Hematological: Negative  Psychiatric/Behavioral: Negative  All other systems reviewed and are negative  Objective:  Vitals:    03/16/22 1118   BP: 138/80   BP Location: Left arm   Patient Position: Sitting   Cuff Size: Large   Weight: 128 kg (283 lb)   Height: 5' 4" (1 626 m)        Physical Exam  Constitutional:       General: She is not in acute distress  Appearance: Normal appearance  HENT:      Head: Normocephalic and atraumatic  Cardiovascular:      Rate and Rhythm: Normal rate and regular rhythm  Pulses: Normal pulses  Heart sounds: Normal heart sounds  Pulmonary:      Effort: Pulmonary effort is normal  No respiratory distress  Breath sounds: Normal breath sounds  Chest:   Breasts: Breasts are symmetrical       Right: Normal  No inverted nipple, mass, nipple discharge or axillary adenopathy  Left: Normal  No inverted nipple, mass, nipple discharge or axillary adenopathy  Abdominal:      General: Bowel sounds are normal       Palpations: Abdomen is soft  There is no mass  Tenderness: There is no abdominal tenderness  Genitourinary:     General: Normal vulva  Vagina: Normal  No vaginal discharge  Cervix: No cervical motion tenderness or lesion  Uterus: Normal  Not tender  Adnexa: Right adnexa normal and left adnexa normal         Right: No mass, tenderness or fullness  Left: No mass, tenderness or fullness          Rectum: Normal  Guaiac result negative  No mass  Musculoskeletal:      Cervical back: Neck supple  No tenderness  Lymphadenopathy:      Cervical: No cervical adenopathy  Upper Body:      Right upper body: No axillary adenopathy  Left upper body: No axillary adenopathy  Lower Body: No right inguinal adenopathy  No left inguinal adenopathy  Skin:     General: Skin is warm and dry  Neurological:      Mental Status: She is alert and oriented to person, place, and time

## 2022-03-17 ENCOUNTER — OFFICE VISIT (OUTPATIENT)
Dept: PHYSICAL THERAPY | Facility: CLINIC | Age: 52
End: 2022-03-17
Payer: COMMERCIAL

## 2022-03-17 DIAGNOSIS — S76.312A TEAR OF LEFT HAMSTRING, INITIAL ENCOUNTER: Primary | ICD-10-CM

## 2022-03-17 LAB
HPV HR 12 DNA CVX QL NAA+PROBE: NEGATIVE
HPV16 DNA CVX QL NAA+PROBE: NEGATIVE
HPV18 DNA CVX QL NAA+PROBE: NEGATIVE

## 2022-03-17 PROCEDURE — 97140 MANUAL THERAPY 1/> REGIONS: CPT | Performed by: PHYSICAL THERAPIST

## 2022-03-17 PROCEDURE — 97112 NEUROMUSCULAR REEDUCATION: CPT | Performed by: PHYSICAL THERAPIST

## 2022-03-17 PROCEDURE — 97110 THERAPEUTIC EXERCISES: CPT | Performed by: PHYSICAL THERAPIST

## 2022-03-17 NOTE — PROGRESS NOTES
Daily Note     Today's date: 3/17/2022  Patient name: Cortney Astorga  : 1970  MRN: [id number]  Referring provider: Yemi Braxton MD  Dx:   Encounter Diagnosis     ICD-10-CM    1  Tear of left hamstring, initial encounter  S76 312A                   Subjective: Pt reports min HA at rest and with ambulation currently  Pt reports high levels of pain with standing HA stretch  Objective: See treatment diary below    Assessment: Pt demonstrated improved tolerance to active knee flexion  Plan: Cont POC  Precautions: none  Dx: L partial HENLEY tear 22      Manuals 3/14 3/17           Graston L HENLEY 3 min 5 min           Laser L HA 4000J 4000J           PSLR                          Neuro Re-Ed             bridges  3x10           Standing ecc knee flex 1x10 3x10                                                                            Ther Ex             Standing GA stretch 15"x2 15"x3 on foam*           Standing HA stretch 15"x2 15"x3           heelslides  5"x10           bike  L1 x 5min                                                               Ther Activity                                       Gait Training                                       Modalities

## 2022-03-21 ENCOUNTER — APPOINTMENT (OUTPATIENT)
Dept: PHYSICAL THERAPY | Facility: CLINIC | Age: 52
End: 2022-03-21
Payer: COMMERCIAL

## 2022-03-22 LAB
LAB AP GYN PRIMARY INTERPRETATION: NORMAL
LAB AP LMP: NORMAL
Lab: NORMAL

## 2022-03-23 ENCOUNTER — OFFICE VISIT (OUTPATIENT)
Dept: PHYSICAL THERAPY | Facility: CLINIC | Age: 52
End: 2022-03-23
Payer: COMMERCIAL

## 2022-03-23 DIAGNOSIS — S76.312A TEAR OF LEFT HAMSTRING, INITIAL ENCOUNTER: Primary | ICD-10-CM

## 2022-03-23 PROCEDURE — 97140 MANUAL THERAPY 1/> REGIONS: CPT

## 2022-03-23 PROCEDURE — 97112 NEUROMUSCULAR REEDUCATION: CPT

## 2022-03-23 PROCEDURE — 97110 THERAPEUTIC EXERCISES: CPT

## 2022-03-23 NOTE — PROGRESS NOTES
Daily Note     Today's date: 3/23/2022  Patient name: Chani Mathew  : 1970  MRN: [id number]  Referring provider: Bria Cartwright MD  Dx:   Encounter Diagnosis     ICD-10-CM    1  Tear of left hamstring, initial encounter  S76 312A        Start Time: 1130  Stop Time: 1210  Total time in clinic (min): 40 minutes    Subjective: Patient reports minimal pain prior to therapy  She states, "Tomorrow I'll be in agony after all the work at PT"  Objective: See treatment diary below    Assessment: Patient only had increased pain during eccentric knee flexion  Otherwise, progressing and well challenged with the current PT POC  Plan: Cont POC  Precautions: none  Dx: L partial HENLEY tear 22      Manuals 3/14 3/17 3/23          Graston L HENLEY 3 min 5 min 5 min          Laser L HA 4000J 4000J 4000J          PSLR                          Neuro Re-Ed             bridges  3x10 3x10          Standing ecc knee flex 1x10 3x10 3x10                                                                           Ther Ex             Standing GA stretch 15"x2 15"x3 on foam* 15"x3          Standing HA stretch 15"x2 15"x3 15"x3          heelslides  5"x10 5"x10          bike  L1 x 5min L1 5 min                                                              Ther Activity                                       Gait Training                                       Modalities

## 2022-03-29 ENCOUNTER — OFFICE VISIT (OUTPATIENT)
Dept: PHYSICAL THERAPY | Facility: CLINIC | Age: 52
End: 2022-03-29
Payer: COMMERCIAL

## 2022-03-29 DIAGNOSIS — S76.312A TEAR OF LEFT HAMSTRING, INITIAL ENCOUNTER: Primary | ICD-10-CM

## 2022-03-29 PROCEDURE — 97112 NEUROMUSCULAR REEDUCATION: CPT

## 2022-03-29 PROCEDURE — 97110 THERAPEUTIC EXERCISES: CPT

## 2022-03-29 NOTE — PROGRESS NOTES
Daily Note     Today's date: 3/29/2022  Patient name: Jolaine Favre  : 1970  MRN: [id number]  Referring provider: Nasreen Tesfaye MD  Dx:   Encounter Diagnosis     ICD-10-CM    1  Tear of left hamstring, initial encounter  S76 312A        Start Time: 1405  Stop Time: 1445  Total time in clinic (min): 40 minutes    Subjective: Patient states, "My hamstring is sore today but it's getting better since starting therapy"  Objective: See treatment diary below    Assessment: Patient only had increased pain during eccentric knee flexion  Otherwise, progressing and well challenged with the current PT POC  Plan: Cont POC  Precautions: none  Dx: L partial HENLEY tear 22      Manuals 3/14 3/17 3/23 3/29         Graston L HENLEY 3 min 5 min 5 min 5 min         Laser L HA 4000J 4000J 4000J 4000J         PSLR                          Neuro Re-Ed             bridges  3x10 3x10 3x12         Standing ecc knee flex 1x10 3x10 3x10 3x10                                                                          Ther Ex             Standing GA stretch 15"x2 15"x3 on foam* 15"x3 15"x3         Standing HA stretch 15"x2 15"x3 15"x3 15"x3         heelslides  5"x10 5"x10 5"x10         bike  L1 x 5min L1 5 min L1 5 min                                                             Ther Activity                                       Gait Training                                       Modalities

## 2022-03-31 ENCOUNTER — OFFICE VISIT (OUTPATIENT)
Dept: PHYSICAL THERAPY | Facility: CLINIC | Age: 52
End: 2022-03-31
Payer: COMMERCIAL

## 2022-03-31 DIAGNOSIS — S76.312A TEAR OF LEFT HAMSTRING, INITIAL ENCOUNTER: Primary | ICD-10-CM

## 2022-03-31 PROCEDURE — 97110 THERAPEUTIC EXERCISES: CPT

## 2022-03-31 PROCEDURE — 97112 NEUROMUSCULAR REEDUCATION: CPT

## 2022-03-31 NOTE — PROGRESS NOTES
Daily Note     Today's date: 3/31/2022  Patient name: Alfred Will  : 1970  MRN: [id number]  Referring provider: Chuckie Donaldson MD  Dx:   Encounter Diagnosis     ICD-10-CM    1  Tear of left hamstring, initial encounter  G64 514L                   Subjective: Patient reports 3/10 left hamstring pain pre tx  Objective: See treatment diary below    Assessment: Patient demonstrated no increased pain during TE program, moderate sensitivity to Graston at L hamstring  Plan: Cont POC  Precautions: none  Dx: L partial HENLEY tear 22      Manuals 3/14 3/17 3/23 3/29 3/31        Graston L HENLEY 3 min 5 min 5 min 5 min 5 min        Laser L HA 4000J 4000J 4000J 4000J 4000J        PSLR                          Neuro Re-Ed             bridges  3x10 3x10 3x12 3x12        Standing ecc knee flex 1x10 3x10 3x10 3x10 3x12                                                                         Ther Ex             Standing GA stretch 15"x2 15"x3 on foam* 15"x3 15"x3 15"x3        Standing HA stretch 15"x2 15"x3 15"x3 15"x3 15"x3        heelslides  5"x10 5"x10 5"x10         bike  L1 x 5min L1 5 min L1 5 min L1  5 min                                                            Ther Activity                                       Gait Training                                       Modalities

## 2022-04-04 ENCOUNTER — OFFICE VISIT (OUTPATIENT)
Dept: PHYSICAL THERAPY | Facility: CLINIC | Age: 52
End: 2022-04-04
Payer: COMMERCIAL

## 2022-04-04 DIAGNOSIS — S76.312A TEAR OF LEFT HAMSTRING, INITIAL ENCOUNTER: Primary | ICD-10-CM

## 2022-04-04 PROCEDURE — 97112 NEUROMUSCULAR REEDUCATION: CPT

## 2022-04-04 PROCEDURE — 97110 THERAPEUTIC EXERCISES: CPT

## 2022-04-04 NOTE — PROGRESS NOTES
Daily Note     Today's date: 2022  Patient name: Layla Stearns  : 1970  MRN: [id number]  Referring provider: Juan Mariee MD  Dx:   Encounter Diagnosis     ICD-10-CM    1  Tear of left hamstring, initial encounter  C44 160A                   Subjective: Patient reports no pain at rest but continued left hamstring pain with bending forward to pick something up  Objective: See treatment diary below    Assessment: Patient demonstrated decreased sensitivity to Graston, slowly improving pain levels  Plan: Cont POC  Precautions: none  Dx: L partial HENLEY tear 22      Manuals 3/14 3/17 3/23 3/29 3/31 4/4       Graston L HENLEY 3 min 5 min 5 min 5 min 5 min 5 min       Laser L HA 4000J 4000J 4000J 4000J 4000J 4000J       PSLR                          Neuro Re-Ed             bridges  3x10 3x10 3x12 3x12 3x15       Standing ecc knee flex 1x10 3x10 3x10 3x10 3x12 3x12                                                                        Ther Ex             Standing GA stretch 15"x2 15"x3 on foam* 15"x3 15"x3 15"x3 15"x3       Standing HA stretch 15"x2 15"x3 15"x3 15"x3 15"x3 15"x3       heelslides  5"x10 5"x10 5"x10  NV       bike  L1 x 5min L1 5 min L1 5 min L1  5 min L1  5 min                                                           Ther Activity                                       Gait Training                                       Modalities

## 2022-04-06 ENCOUNTER — OFFICE VISIT (OUTPATIENT)
Dept: PHYSICAL THERAPY | Facility: CLINIC | Age: 52
End: 2022-04-06
Payer: COMMERCIAL

## 2022-04-06 DIAGNOSIS — S76.312A TEAR OF LEFT HAMSTRING, INITIAL ENCOUNTER: Primary | ICD-10-CM

## 2022-04-06 PROCEDURE — 97112 NEUROMUSCULAR REEDUCATION: CPT

## 2022-04-06 PROCEDURE — 97140 MANUAL THERAPY 1/> REGIONS: CPT

## 2022-04-06 PROCEDURE — 97110 THERAPEUTIC EXERCISES: CPT

## 2022-04-06 NOTE — PROGRESS NOTES
Daily Note     Today's date: 2022  Patient name: Ileana Turner  : 1970  MRN: [id number]  Referring provider: Jaquelin Maya MD  Dx:   Encounter Diagnosis     ICD-10-CM    1  Tear of left hamstring, initial encounter  S76 312A        Start Time: 1430  Stop Time: 1510  Total time in clinic (min): 40 minutes    Subjective: Patient reports increased HS pain when leaning forward  No complaints of pain prior to therapy  Highest level of pain this week was a "7/10"  Objective: See treatment diary below    Assessment: No pain during eccentric HS curls  Seems to be responding well to manual interventions listed below  Progress as able  Plan: Cont POC  Precautions: none  Dx: L partial HENLEY tear 22      Manuals 3/14 3/17 3/23 3/29 3/31 4/4 4/6      Graston L HENLEY 3 min 5 min 5 min 5 min 5 min 5 min 5 min      Laser L HA 4000J 4000J 4000J 4000J 4000J 4000J 4000J      PSLR                          Neuro Re-Ed             bridges  3x10 3x10 3x12 3x12 3x15 3x15      Standing ecc knee flex 1x10 3x10 3x10 3x10 3x12 3x12 3x12                                                                       Ther Ex             Standing GA stretch 15"x2 15"x3 on foam* 15"x3 15"x3 15"x3 15"x3 15"x3      Standing HA stretch 15"x2 15"x3 15"x3 15"x3 15"x3 15"x3 15"x3      heelslides  5"x10 5"x10 5"x10  NV 5"x10      bike  L1 x 5min L1 5 min L1 5 min L1  5 min L1  5 min L1   5 min                                                          Ther Activity                                       Gait Training                                       Modalities

## 2022-04-12 ENCOUNTER — OFFICE VISIT (OUTPATIENT)
Dept: PHYSICAL THERAPY | Facility: CLINIC | Age: 52
End: 2022-04-12
Payer: COMMERCIAL

## 2022-04-12 DIAGNOSIS — S76.312A TEAR OF LEFT HAMSTRING, INITIAL ENCOUNTER: Primary | ICD-10-CM

## 2022-04-12 PROCEDURE — 97110 THERAPEUTIC EXERCISES: CPT | Performed by: PHYSICAL THERAPIST

## 2022-04-12 PROCEDURE — 97140 MANUAL THERAPY 1/> REGIONS: CPT | Performed by: PHYSICAL THERAPIST

## 2022-04-12 PROCEDURE — 97112 NEUROMUSCULAR REEDUCATION: CPT | Performed by: PHYSICAL THERAPIST

## 2022-04-12 NOTE — PROGRESS NOTES
Daily Note     Today's date: 2022  Patient name: Keely Mckinnon  : 1970  MRN: [id number]  Referring provider: Leala Opitz, MD  Dx:   Encounter Diagnosis     ICD-10-CM    1  Tear of left hamstring, initial encounter  O08 562L                   Subjective: Patient reports 4/10 L prox HA pain with lumbar flexion  Objective: See treatment diary below    Assessment: Pt demonstrated a more normalized gait pattern  Plan: Cont POC  Precautions: none  Dx: L partial HENLEY tear 22      Manuals 3/14 3/17 3/23 3/29 3/31 4/4 4/6 4/12     Graston L HENLEY 3 min 5 min 5 min 5 min 5 min 5 min 5 min 5 min     Laser L HA 4000J 4000J 4000J 4000J 4000J 4000J 4000J 4000J     PSLR                          Neuro Re-Ed             bridges  3x10 3x10 3x12 3x12 3x15 3x15 3x15     Standing ecc knee flex 1x10 3x10 3x10 3x10 3x12 3x12 3x12 1#/ 3x10                                                                      Ther Ex             Standing GA stretch 15"x2 15"x3 on foam* 15"x3 15"x3 15"x3 15"x3 15"x3 15"x3     Standing HA stretch 15"x2 15"x3 15"x3 15"x3 15"x3 15"x3 15"x3 15"x3     heelslides  5"x10 5"x10 5"x10  NV 5"x10 5"x10     bike  L1 x 5min L1 5 min L1 5 min L1  5 min L1  5 min L1   5 min                                                          Ther Activity                                       Gait Training                                       Modalities

## 2022-04-14 ENCOUNTER — OFFICE VISIT (OUTPATIENT)
Dept: OBGYN CLINIC | Facility: CLINIC | Age: 52
End: 2022-04-14
Payer: COMMERCIAL

## 2022-04-14 ENCOUNTER — APPOINTMENT (OUTPATIENT)
Dept: PHYSICAL THERAPY | Facility: CLINIC | Age: 52
End: 2022-04-14
Payer: COMMERCIAL

## 2022-04-14 VITALS
DIASTOLIC BLOOD PRESSURE: 89 MMHG | HEIGHT: 64 IN | BODY MASS INDEX: 47.97 KG/M2 | HEART RATE: 96 BPM | SYSTOLIC BLOOD PRESSURE: 134 MMHG | WEIGHT: 281 LBS

## 2022-04-14 DIAGNOSIS — S76.312D TEAR OF LEFT HAMSTRING, SUBSEQUENT ENCOUNTER: Primary | ICD-10-CM

## 2022-04-14 PROCEDURE — 1036F TOBACCO NON-USER: CPT | Performed by: ORTHOPAEDIC SURGERY

## 2022-04-14 PROCEDURE — 99212 OFFICE O/P EST SF 10 MIN: CPT | Performed by: ORTHOPAEDIC SURGERY

## 2022-04-14 PROCEDURE — 3008F BODY MASS INDEX DOCD: CPT | Performed by: ORTHOPAEDIC SURGERY

## 2022-04-19 ENCOUNTER — APPOINTMENT (OUTPATIENT)
Dept: PHYSICAL THERAPY | Facility: CLINIC | Age: 52
End: 2022-04-19
Payer: COMMERCIAL

## 2022-04-20 NOTE — PROGRESS NOTES
Assessment:       1  Tear of left hamstring, subsequent encounter          Plan:        Patient has made good clinical progress with regards to her left hamstring injury  At this time I have advised her to continue with hamstring stretching and strengthening exercises  Will follow up in the future on an as-needed basis  Subjective:     Patient ID: Prabhakar Fernandez is a 54 y o  female  Chief Complaint:    HPI  Treasure Bhatia is a 72-year-old lady who presents today for a follow-up of her left hamstring strain/partial tear  Last seen in this regard on 3/3/2022  Patient has done physical therapy rehabilitation  She currently reports significant improvement of her left posterior thigh pain  She is able to fully weightbear and ambulate without any ambulatory assistance  Denies any new injury  Denies any new onset lower extremity tingling numbness  Social History     Occupational History    Occupation: DISABILITY    Tobacco Use    Smoking status: Never Smoker    Smokeless tobacco: Never Used   Vaping Use    Vaping Use: Never used   Substance and Sexual Activity    Alcohol use: Not Currently    Drug use: No    Sexual activity: Yes     Partners: Male     Birth control/protection: Female Sterilization      Review of Systems        Objective:     Ortho ExamPhysical Exam  Vitals and nursing note reviewed  Constitutional:       Appearance: She is well-developed  HENT:      Head: Normocephalic and atraumatic  Eyes:      Conjunctiva/sclera: Conjunctivae normal       Pupils: Pupils are equal, round, and reactive to light  Cardiovascular:      Rate and Rhythm: Normal rate and regular rhythm  Pulmonary:      Effort: Pulmonary effort is normal  No respiratory distress  Skin:     General: Skin is warm and dry  Findings: No erythema  Neurological:      Mental Status: She is alert and oriented to person, place, and time  Cranial Nerves: No cranial nerve deficit     Psychiatric: Behavior: Behavior normal          Thought Content: Thought content normal          Judgment: Judgment normal          Left thigh exam:  No significant swelling noted  No significant tenderness to palpation of the left posterior thigh/hamstrings  Popliteal angle is about 15°  Left knee flexion and extension strength are 4+/5  Clinically intact distal neurovascular status

## 2022-04-21 ENCOUNTER — APPOINTMENT (OUTPATIENT)
Dept: PHYSICAL THERAPY | Facility: CLINIC | Age: 52
End: 2022-04-21
Payer: COMMERCIAL

## 2022-04-29 NOTE — PROGRESS NOTES
requesting for wife, medication for dental procedure for the next 2 days  She opted not to undergo general anesthesia and would like something for anxiety, in addition to her regular medication  PDMP reviewed  Valium given to take prn 
yes

## 2022-05-10 DIAGNOSIS — G47.419 PRIMARY NARCOLEPSY WITHOUT CATAPLEXY: ICD-10-CM

## 2022-05-10 DIAGNOSIS — G47.411 NARCOLEPSY AND CATAPLEXY: ICD-10-CM

## 2022-05-11 RX ORDER — SODIUM OXYBATE 0.5 G/ML
SOLUTION ORAL
Qty: 540 ML | Refills: 5 | Status: SHIPPED | OUTPATIENT
Start: 2022-05-11

## 2022-05-12 DIAGNOSIS — R00.0 TACHYCARDIA: ICD-10-CM

## 2022-05-13 RX ORDER — METOPROLOL TARTRATE 50 MG/1
50 TABLET, FILM COATED ORAL 2 TIMES DAILY
Qty: 180 TABLET | Refills: 0 | Status: SHIPPED | OUTPATIENT
Start: 2022-05-13

## 2022-06-14 DIAGNOSIS — K21.9 GASTROESOPHAGEAL REFLUX DISEASE: ICD-10-CM

## 2022-06-14 RX ORDER — LANSOPRAZOLE 30 MG/1
30 CAPSULE, DELAYED RELEASE ORAL DAILY
Qty: 90 CAPSULE | Refills: 1 | Status: SHIPPED | OUTPATIENT
Start: 2022-06-14

## 2022-06-16 DIAGNOSIS — L30.4 INTERTRIGO: ICD-10-CM

## 2022-06-16 NOTE — TELEPHONE ENCOUNTER
Medication refill requested:    econazole nitrate 1 % cream       Last office visit: 10/25/2021  Next office visit: 07/15/2022  Last refilled: 10/25/2021  Labs: No  Ordering Provider: [facility] (select pharmacy send RX to):   [facility] [id number] [place] [id number] [place] [address]  [address] [place] [address] [place] F [id number]  Phone: [phone] Fax: [phone]

## 2022-06-27 ENCOUNTER — TELEPHONE (OUTPATIENT)
Dept: FAMILY MEDICINE CLINIC | Facility: CLINIC | Age: 52
End: 2022-06-27

## 2022-07-01 ENCOUNTER — APPOINTMENT (OUTPATIENT)
Dept: LAB | Facility: AMBULARY SURGERY CENTER | Age: 52
End: 2022-07-01
Payer: COMMERCIAL

## 2022-07-01 DIAGNOSIS — Z98.84 S/P GASTRIC BYPASS: ICD-10-CM

## 2022-07-01 DIAGNOSIS — R73.03 PREDIABETES: ICD-10-CM

## 2022-07-01 DIAGNOSIS — D50.8 OTHER IRON DEFICIENCY ANEMIA: ICD-10-CM

## 2022-07-01 DIAGNOSIS — E78.00 HYPERCHOLESTEREMIA: ICD-10-CM

## 2022-07-01 DIAGNOSIS — E53.8 VITAMIN B12 DEFICIENCY: ICD-10-CM

## 2022-07-01 DIAGNOSIS — E55.9 VITAMIN D DEFICIENCY: ICD-10-CM

## 2022-07-01 LAB
25(OH)D3 SERPL-MCNC: 30.2 NG/ML (ref 30–100)
ALBUMIN SERPL BCP-MCNC: 3 G/DL (ref 3.5–5)
ALP SERPL-CCNC: 259 U/L (ref 46–116)
ALT SERPL W P-5'-P-CCNC: 154 U/L (ref 12–78)
ANION GAP SERPL CALCULATED.3IONS-SCNC: 8 MMOL/L (ref 4–13)
AST SERPL W P-5'-P-CCNC: 62 U/L (ref 5–45)
BASOPHILS # BLD AUTO: 0.03 THOUSANDS/ΜL (ref 0–0.1)
BASOPHILS NFR BLD AUTO: 1 % (ref 0–1)
BILIRUB SERPL-MCNC: 0.23 MG/DL (ref 0.2–1)
BUN SERPL-MCNC: 10 MG/DL (ref 5–25)
CALCIUM ALBUM COR SERPL-MCNC: 10.1 MG/DL (ref 8.3–10.1)
CALCIUM SERPL-MCNC: 9.3 MG/DL (ref 8.3–10.1)
CHLORIDE SERPL-SCNC: 107 MMOL/L (ref 100–108)
CHOLEST SERPL-MCNC: 224 MG/DL
CO2 SERPL-SCNC: 26 MMOL/L (ref 21–32)
CREAT SERPL-MCNC: 0.75 MG/DL (ref 0.6–1.3)
EOSINOPHIL # BLD AUTO: 0.07 THOUSAND/ΜL (ref 0–0.61)
EOSINOPHIL NFR BLD AUTO: 1 % (ref 0–6)
ERYTHROCYTE [DISTWIDTH] IN BLOOD BY AUTOMATED COUNT: 13.5 % (ref 11.6–15.1)
EST. AVERAGE GLUCOSE BLD GHB EST-MCNC: 114 MG/DL
FERRITIN SERPL-MCNC: 88 NG/ML (ref 8–388)
GFR SERPL CREATININE-BSD FRML MDRD: 93 ML/MIN/1.73SQ M
GLUCOSE P FAST SERPL-MCNC: 92 MG/DL (ref 65–99)
HBA1C MFR BLD: 5.6 %
HCT VFR BLD AUTO: 43.4 % (ref 34.8–46.1)
HDLC SERPL-MCNC: 53 MG/DL
HGB BLD-MCNC: 14.1 G/DL (ref 11.5–15.4)
IMM GRANULOCYTES # BLD AUTO: 0.02 THOUSAND/UL (ref 0–0.2)
IMM GRANULOCYTES NFR BLD AUTO: 0 % (ref 0–2)
LDLC SERPL CALC-MCNC: 150 MG/DL (ref 0–100)
LYMPHOCYTES # BLD AUTO: 1.91 THOUSANDS/ΜL (ref 0.6–4.47)
LYMPHOCYTES NFR BLD AUTO: 36 % (ref 14–44)
MCH RBC QN AUTO: 28.3 PG (ref 26.8–34.3)
MCHC RBC AUTO-ENTMCNC: 32.5 G/DL (ref 31.4–37.4)
MCV RBC AUTO: 87 FL (ref 82–98)
MONOCYTES # BLD AUTO: 0.37 THOUSAND/ΜL (ref 0.17–1.22)
MONOCYTES NFR BLD AUTO: 7 % (ref 4–12)
NEUTROPHILS # BLD AUTO: 2.96 THOUSANDS/ΜL (ref 1.85–7.62)
NEUTS SEG NFR BLD AUTO: 55 % (ref 43–75)
NONHDLC SERPL-MCNC: 171 MG/DL
NRBC BLD AUTO-RTO: 0 /100 WBCS
PLATELET # BLD AUTO: 297 THOUSANDS/UL (ref 149–390)
PMV BLD AUTO: 10.9 FL (ref 8.9–12.7)
POTASSIUM SERPL-SCNC: 4.2 MMOL/L (ref 3.5–5.3)
PROT SERPL-MCNC: 7.6 G/DL (ref 6.4–8.2)
RBC # BLD AUTO: 4.99 MILLION/UL (ref 3.81–5.12)
SODIUM SERPL-SCNC: 141 MMOL/L (ref 136–145)
TRIGL SERPL-MCNC: 103 MG/DL
VIT B12 SERPL-MCNC: 475 PG/ML (ref 100–900)
WBC # BLD AUTO: 5.36 THOUSAND/UL (ref 4.31–10.16)

## 2022-07-01 PROCEDURE — 83036 HEMOGLOBIN GLYCOSYLATED A1C: CPT

## 2022-07-01 PROCEDURE — 85025 COMPLETE CBC W/AUTO DIFF WBC: CPT

## 2022-07-01 PROCEDURE — 82306 VITAMIN D 25 HYDROXY: CPT

## 2022-07-01 PROCEDURE — 82728 ASSAY OF FERRITIN: CPT

## 2022-07-01 PROCEDURE — 80061 LIPID PANEL: CPT

## 2022-07-01 PROCEDURE — 36415 COLL VENOUS BLD VENIPUNCTURE: CPT

## 2022-07-01 PROCEDURE — 80053 COMPREHEN METABOLIC PANEL: CPT

## 2022-07-01 PROCEDURE — 82607 VITAMIN B-12: CPT

## 2022-07-08 ENCOUNTER — OFFICE VISIT (OUTPATIENT)
Dept: FAMILY MEDICINE CLINIC | Facility: CLINIC | Age: 52
End: 2022-07-08
Payer: COMMERCIAL

## 2022-07-08 VITALS
BODY MASS INDEX: 46.61 KG/M2 | WEIGHT: 273 LBS | OXYGEN SATURATION: 97 % | RESPIRATION RATE: 18 BRPM | HEIGHT: 64 IN | SYSTOLIC BLOOD PRESSURE: 128 MMHG | HEART RATE: 85 BPM | DIASTOLIC BLOOD PRESSURE: 74 MMHG

## 2022-07-08 DIAGNOSIS — R79.89 ELEVATED LFTS: ICD-10-CM

## 2022-07-08 DIAGNOSIS — Z12.11 SCREENING FOR COLON CANCER: ICD-10-CM

## 2022-07-08 DIAGNOSIS — R73.01 IFG (IMPAIRED FASTING GLUCOSE): ICD-10-CM

## 2022-07-08 DIAGNOSIS — E53.8 VITAMIN B12 DEFICIENCY: ICD-10-CM

## 2022-07-08 DIAGNOSIS — R19.5 POSITIVE COLORECTAL CANCER SCREENING USING COLOGUARD TEST: ICD-10-CM

## 2022-07-08 DIAGNOSIS — Z23 ENCOUNTER FOR IMMUNIZATION: ICD-10-CM

## 2022-07-08 DIAGNOSIS — R74.8 ELEVATED ALKALINE PHOSPHATASE LEVEL: ICD-10-CM

## 2022-07-08 DIAGNOSIS — E55.9 VITAMIN D DEFICIENCY: ICD-10-CM

## 2022-07-08 DIAGNOSIS — Z12.31 ENCOUNTER FOR SCREENING MAMMOGRAM FOR BREAST CANCER: ICD-10-CM

## 2022-07-08 DIAGNOSIS — Z00.00 WELL ADULT EXAM: Primary | ICD-10-CM

## 2022-07-08 DIAGNOSIS — E78.00 HYPERCHOLESTEREMIA: ICD-10-CM

## 2022-07-08 DIAGNOSIS — M25.50 PAIN IN JOINT, MULTIPLE SITES: ICD-10-CM

## 2022-07-08 DIAGNOSIS — F33.3 SEVERE EPISODE OF RECURRENT MAJOR DEPRESSIVE DISORDER, WITH PSYCHOTIC FEATURES (HCC): ICD-10-CM

## 2022-07-08 PROCEDURE — 90677 PCV20 VACCINE IM: CPT

## 2022-07-08 PROCEDURE — 99396 PREV VISIT EST AGE 40-64: CPT | Performed by: FAMILY MEDICINE

## 2022-07-08 PROCEDURE — 3725F SCREEN DEPRESSION PERFORMED: CPT | Performed by: FAMILY MEDICINE

## 2022-07-08 PROCEDURE — 90471 IMMUNIZATION ADMIN: CPT

## 2022-07-08 PROCEDURE — 99214 OFFICE O/P EST MOD 30 MIN: CPT | Performed by: FAMILY MEDICINE

## 2022-07-08 RX ORDER — CLONAZEPAM 0.5 MG/1
0.5 TABLET ORAL 2 TIMES DAILY PRN
COMMUNITY
Start: 2022-06-23

## 2022-07-08 NOTE — PATIENT INSTRUCTIONS
Stop Tylenol   Try over the counter muscle rubs for the pain   Also try pain patches like SalonPas   Get repeat blood work in 2 weeks   Let us know if you get the shingles vaccine at the pharmacy

## 2022-07-22 LAB — COLOGUARD RESULT REPORTABLE: POSITIVE

## 2022-07-25 ENCOUNTER — TELEPHONE (OUTPATIENT)
Dept: FAMILY MEDICINE CLINIC | Facility: CLINIC | Age: 52
End: 2022-07-25

## 2022-07-25 NOTE — TELEPHONE ENCOUNTER
Patient's  called, he is concerned about her positive cologuard and would like to discuss this with someone today  GI cannot see her until September and he is very upset about this  Please call him at his work number as soon as possible

## 2022-07-26 ENCOUNTER — OFFICE VISIT (OUTPATIENT)
Dept: GASTROENTEROLOGY | Facility: AMBULARY SURGERY CENTER | Age: 52
End: 2022-07-26
Payer: COMMERCIAL

## 2022-07-26 VITALS
HEART RATE: 83 BPM | OXYGEN SATURATION: 100 % | SYSTOLIC BLOOD PRESSURE: 154 MMHG | DIASTOLIC BLOOD PRESSURE: 88 MMHG | WEIGHT: 273 LBS | HEIGHT: 64 IN | BODY MASS INDEX: 46.61 KG/M2

## 2022-07-26 DIAGNOSIS — R19.5 POSITIVE COLORECTAL CANCER SCREENING USING COLOGUARD TEST: Primary | ICD-10-CM

## 2022-07-26 DIAGNOSIS — K21.9 GASTROESOPHAGEAL REFLUX DISEASE WITHOUT ESOPHAGITIS: ICD-10-CM

## 2022-07-26 PROCEDURE — 99244 OFF/OP CNSLTJ NEW/EST MOD 40: CPT | Performed by: INTERNAL MEDICINE

## 2022-07-26 RX ORDER — CLONAZEPAM 2 MG/1
TABLET ORAL
COMMUNITY
Start: 2022-07-21

## 2022-07-26 RX ORDER — FAMOTIDINE 20 MG/1
20 TABLET, FILM COATED ORAL 2 TIMES DAILY
Qty: 60 TABLET | Refills: 11 | Status: SHIPPED | OUTPATIENT
Start: 2022-07-26 | End: 2022-07-26

## 2022-07-26 RX ORDER — SODIUM PICOSULFATE, MAGNESIUM OXIDE, AND ANHYDROUS CITRIC ACID 10; 3.5; 12 MG/160ML; G/160ML; G/160ML
1 LIQUID ORAL SEE ADMIN INSTRUCTIONS
Qty: 320 ML | Refills: 0 | Status: SHIPPED | OUTPATIENT
Start: 2022-07-26

## 2022-07-26 RX ORDER — ALPRAZOLAM 1 MG/1
TABLET ORAL
COMMUNITY
Start: 2022-07-12

## 2022-07-26 NOTE — ASSESSMENT & PLAN NOTE
Probable false-positive test   Rule out colorectal lesions including polyps or malignancy     -Schedule for colonoscopy  -High-fiber diet     -Patient was given instructions about the colonoscopy prep     -Patient was explained about  the risks and benefits of the procedure  Risks including but not limited to bleeding, infection, perforation were explained in detail  Also explained about less than 100% sensitivity with the exam and other alternatives

## 2022-07-26 NOTE — TELEPHONE ENCOUNTER
Phone call placed to Heidi Paz and informed   He would like her added to wait list      GI closed I will call tomorrow and have her added to the wait list

## 2022-07-26 NOTE — PROGRESS NOTES
Consultation - [facility]  Meek Encinas 1970 female         Chief Complaint:  Positive cologuard    HPI:  80-year-old female with history of anxiety, depression, hypertension, GERD was tested positive on stool cologuard  Patient never had colonoscopy in the past   Regular bowel movements and denies any blood or mucus in the stool  Good appetite, no recent weight loss  She has problems with chronic acid reflux for which she takes Prevacid regularly  Denies any heartburn acid reflux on medication  Denies any difficulty swallowing  No abdominal pain, nausea or vomiting  Chaperon: Ms Shin Smaller: Review of Systems   Constitutional: Negative for activity change, appetite change, chills, diaphoresis, fatigue, fever and unexpected weight change  HENT: Negative for ear discharge, ear pain, facial swelling, hearing loss, nosebleeds, sore throat, tinnitus and voice change  Eyes: Negative for pain, discharge, redness, itching and visual disturbance  Respiratory: Negative for apnea, cough, chest tightness, shortness of breath and wheezing  Cardiovascular: Negative for chest pain and palpitations  Gastrointestinal:        As noted in HPI   Endocrine: Negative for cold intolerance, heat intolerance and polyuria  Genitourinary: Negative for difficulty urinating, dysuria, flank pain, hematuria and urgency  Musculoskeletal: Negative for arthralgias, back pain, gait problem, joint swelling and myalgias  Skin: Negative for rash and wound  Neurological: Negative for dizziness, tremors, seizures, speech difficulty, light-headedness, numbness and headaches  Hematological: Negative for adenopathy  Does not bruise/bleed easily  Psychiatric/Behavioral: Negative for agitation, behavioral problems and confusion  The patient is not nervous/anxious           Past Medical History:   Diagnosis Date    Anxiety     Asthma     childhood    Depression     Narcolepsy  Obesity       Past Surgical History:   Procedure Laterality Date     SECTION      CHOLECYSTECTOMY      KNEE ARTHROSCOPY Left     MELISSA-EN-Y PROCEDURE  2010     Social History     Socioeconomic History    Marital status: /Civil Union     Spouse name: Not on file    Number of children: 3    Years of education: Not on file    Highest education level: Not on file   Occupational History    Occupation: DISABILITY    Tobacco Use    Smoking status: Never Smoker    Smokeless tobacco: Never Used   Vaping Use    Vaping Use: Never used   Substance and Sexual Activity    Alcohol use: Not Currently    Drug use: No    Sexual activity: Yes     Partners: Male     Birth control/protection: Female Sterilization   Other Topics Concern    Not on file   Social History Narrative    Drinks ice tea 3-4 per day     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    3 children 19,16 and 15 (adopted)         Social Determinants of Health     Financial Resource Strain: Not on file   Food Insecurity: Not on file   Transportation Needs: Not on file   Physical Activity: Not on file   Stress: Not on file   Social Connections: Not on file   Intimate Partner Violence: Not on file   Housing Stability: Not on file     Family History   Problem Relation Age of Onset    Hypertension Mother     Depression Mother     Atrial fibrillation Mother     Hypertension Father     Depression Father     Heart failure Father         heart attack in 35s    Diabetes Maternal Grandmother     Stroke Maternal Grandmother     Brain cancer Maternal Grandfather     Lung cancer Paternal Grandfather     No Known Problems Sister     No Known Problems Maternal Aunt     No Known Problems Maternal Aunt     No Known Problems Maternal Aunt     No Known Problems Maternal Aunt     No Known Problems Paternal Aunt     Alcohol abuse Neg Hx     Substance Abuse Neg Hx     Mental illness Neg Hx      Codeine and Morphine  Current Outpatient Medications   Medication Sig Dispense Refill    Albuterol Sulfate (ProAir RespiClick) 153 (90 Base) MCG/ACT AEPB Inhale 1 puff every 4 (four) hours as needed (wheezing) 1 each 1    ALPRAZolam (XANAX) 1 mg tablet TAKE 1 TO 2 TABLETS BY MOUTH DAILY AS NEEDED      clonazePAM (KlonoPIN) 0 5 mg tablet Take 0 5 mg by mouth 2 (two) times a day as needed      clonazePAM (KlonoPIN) 2 mg tablet TAKE 1/2 TABLET BY MOUTH FOUR TIMES DAILY  MAY USE WITH ALPRAZOLAM      cyanocobalamin 1,000 mcg/mL 1 ml SQ Every 2 weeks with syringe and needles 30 mL 11    econazole nitrate 1 % cream Apply topically daily 85 g 2    ergocalciferol (VITAMIN D2) 50,000 units Take 1 capsule (50,000 Units total) by mouth every 14 (fourteen) days 30 capsule 5    famotidine (PEPCID) 20 mg tablet Take 1 tablet (20 mg total) by mouth 2 (two) times a day 60 tablet 11    ferrous sulfate 325 (65 Fe) mg tablet Take 1 tablet (325 mg total) by mouth daily with breakfast 90 tablet 1    lansoprazole (PREVACID) 30 mg capsule Take 1 capsule (30 mg total) by mouth daily 90 capsule 1    loxapine (LOXITANE) 10 mg capsule 1 in am, 1 in afternoon, 3 before bed - per Dr Melanie Mckinney metoprolol tartrate (LOPRESSOR) 50 mg tablet Take 1 tablet (50 mg total) by mouth in the morning and 1 tablet (50 mg total) before bedtime   180 tablet 0    Sod Picosulfate-Mag Ox-Cit Acd (Clenpiq) 10-3 5-12 MG-GM -GM/160ML SOLN Take 1 Package by mouth see administration instructions 320 mL 0    Sodium Oxybate (Xyrem) 500 MG/ML SOLN First dose (bedtime): 4 5 g + Second dose (2 5-4 hrs later): 4 5 g = 9 g Total Nightly Dose [Total Qty: 1 month(s)] 540 mL 5    venlafaxine (EFFEXOR) 100 MG tablet Take 1 tablet (100 mg total) by mouth 3 (three) times a day 90 tablet 0    loxapine (LOXITANE) 5 mg capsule Take 5 mg by mouth in the morning (Patient not taking: Reported on 7/26/2022)      NEEDLE, DISP, 30 G (BD Eclipse Needle) 30G X 1/2" MISC Use daily (Patient not taking: Reported on 7/26/2022) 30 each 1     No current facility-administered medications for this visit  Blood pressure 154/88, pulse 83, height 5' 4" (1 626 m), weight 124 kg (273 lb), SpO2 100 %  PHYSICAL EXAM: Physical Exam  Constitutional:       Appearance: Normal appearance  She is well-developed  HENT:      Head: Normocephalic and atraumatic  Nose: Nose normal    Eyes:      Conjunctiva/sclera: Conjunctivae normal    Neck:      Thyroid: No thyromegaly  Vascular: No JVD  Trachea: No tracheal deviation  Cardiovascular:      Rate and Rhythm: Normal rate and regular rhythm  Heart sounds: Normal heart sounds  No murmur heard  No friction rub  No gallop  Pulmonary:      Effort: Pulmonary effort is normal  No respiratory distress  Breath sounds: Normal breath sounds  No wheezing or rales  Abdominal:      General: Bowel sounds are normal  There is no distension  Palpations: Abdomen is soft  There is no mass  Tenderness: There is no abdominal tenderness  There is no guarding  Hernia: No hernia is present  Musculoskeletal:         General: No tenderness or deformity  Cervical back: Neck supple  Right lower leg: No edema  Left lower leg: No edema  Lymphadenopathy:      Cervical: No cervical adenopathy  Skin:     General: Skin is warm and dry  Findings: No erythema or rash  Neurological:      Mental Status: She is alert and oriented to person, place, and time  Psychiatric:         Mood and Affect: Mood normal          Behavior: Behavior normal          Thought Content:  Thought content normal           Lab Results   Component Value Date    WBC 5 36 07/01/2022    HGB 14 1 07/01/2022    HCT 43 4 07/01/2022    MCV 87 07/01/2022     07/01/2022     Lab Results   Component Value Date    CALCIUM 9 3 07/01/2022    K 4 2 07/01/2022    CO2 26 07/01/2022     07/01/2022    BUN 10 07/01/2022    CREATININE 0 75 07/01/2022     Lab Results   Component Value Date     (H) 07/01/2022    AST 62 (H) 07/01/2022    ALKPHOS 259 (H) 07/01/2022     No results found for: INR, PROTIME    XR femur 2 views LEFT    Result Date: 1/31/2022  Impression: No acute osseous abnormality  Workstation performed: [id number]     XR knee 4+ views left injury    Result Date: 1/31/2022  Impression: No acute osseous abnormality  Workstation performed: [id number]     XR tibia fibula 2 views LEFT    Result Date: 1/31/2022  Impression: No acute osseous abnormality  Workstation performed: [id number]     XR pelvis ap only 1 or 2 vw    Result Date: 1/31/2022  Impression: No acute osseous abnormality  Workstation performed: [id number]       ASSESSMENT & PLAN:    Gastroesophageal reflux disease without esophagitis  Gastroesophageal reflux disease - Patient has the symptoms of chronic acid reflux for a long time  Possible hiatal hernia or LES weakness  Should rule out Pike's esophagus because of chronic symptoms         -Patient was explained about the lifestyle and dietary modifications  Advised to avoid fatty foods, chocolates, caffeine, alcohol and any other triggering foods  Avoid eating for at least 3 hours before going to bed  -schedule for EGD    -discussed about the long-term side effects from Prevacid and advised her to change to Pepcid  Prescription was sent to the pharmacy  Positive colorectal cancer screening using Cologuard test  Probable false-positive test   Rule out colorectal lesions including polyps or malignancy     -Schedule for colonoscopy  -High-fiber diet     -Patient was given instructions about the colonoscopy prep     -Patient was explained about  the risks and benefits of the procedure  Risks including but not limited to bleeding, infection, perforation were explained in detail  Also explained about less than 100% sensitivity with the exam and other alternatives

## 2022-07-26 NOTE — TELEPHONE ENCOUNTER
called the office states that wife seen Gi this morning and they can not do a coloscopy until Nov and  thinks that is to far out  He is worried she has  Colon cancer  He would like to know what Dr Aleah De Souza can do for them?

## 2022-07-26 NOTE — ASSESSMENT & PLAN NOTE
Gastroesophageal reflux disease - Patient has the symptoms of chronic acid reflux for a long time  Possible hiatal hernia or LES weakness  Should rule out Pike's esophagus because of chronic symptoms         -Patient was explained about the lifestyle and dietary modifications  Advised to avoid fatty foods, chocolates, caffeine, alcohol and any other triggering foods  Avoid eating for at least 3 hours before going to bed  -schedule for EGD    -discussed about the long-term side effects from Prevacid and advised her to change to Pepcid  Prescription was sent to the pharmacy

## 2022-07-26 NOTE — TELEPHONE ENCOUNTER
Please reassure him that this is not too far out  This may be a false positive  Her hemoglobin and iron levels are normal and this is all very reassuring   We can certainly ask them to put her on a wait list in case they have a cancellation  (please let GI know)

## 2022-07-26 NOTE — PATIENT INSTRUCTIONS
Scheduled date of EGD/colonoscopy (as of today):11/7/2022  Physician performing EGD/colonoscopy:DR DUMONT  Location of EGD/colonoscopy:[facility]  Desired bowel prep reviewed with patient:VAIBHAV PER DR Joshua Central  Instructions reviewed with patient by:SHARATH HAYDEN  Clearances:

## 2022-07-26 NOTE — TELEPHONE ENCOUNTER
Spoke to - Wife was told that she has "colon Cancer" I advise  of positive results and that it does not mean she has Colon Cancer   states that he just wanted a better understanding of the next steps  Advise him that we placed a referral in her chart to see GI and see what they recommend next   states that she has her period could this be why it was positive? I advise him per Dr Candelaria Kras other message it could be but that she recommend she see GI  He understood and had no other questions

## 2022-08-04 ENCOUNTER — TELEPHONE (OUTPATIENT)
Dept: GASTROENTEROLOGY | Facility: AMBULARY SURGERY CENTER | Age: 52
End: 2022-08-04

## 2022-08-04 ENCOUNTER — TELEPHONE (OUTPATIENT)
Dept: FAMILY MEDICINE CLINIC | Facility: CLINIC | Age: 52
End: 2022-08-04

## 2022-08-04 NOTE — TELEPHONE ENCOUNTER
Marci w/ pt   Moved pt up from 11/7/2022 at [facility] to 10/10/2022 at [facility]

## 2022-08-04 NOTE — TELEPHONE ENCOUNTER
Patient's  called, he would like to speak to someone today regarding his wife's colonoscopy please call him as soon as you can as he is very concerned about this report

## 2022-08-04 NOTE — TELEPHONE ENCOUNTER
Patients GI provider:  Dr Xiomy Hills    Number to return call: (  [phone]    Reason for call: Pt calling to move her procedure up if possible    Scheduled procedure/appointment date if applicable: Apt/procedure   11-7-22

## 2022-08-07 DIAGNOSIS — R00.0 TACHYCARDIA: ICD-10-CM

## 2022-08-08 RX ORDER — METOPROLOL TARTRATE 50 MG/1
TABLET, FILM COATED ORAL
Qty: 180 TABLET | Refills: 0 | OUTPATIENT
Start: 2022-08-08

## 2022-08-12 DIAGNOSIS — R00.0 TACHYCARDIA: ICD-10-CM

## 2022-08-12 RX ORDER — METOPROLOL TARTRATE 50 MG/1
50 TABLET, FILM COATED ORAL 2 TIMES DAILY
Qty: 180 TABLET | Refills: 0 | Status: SHIPPED | OUTPATIENT
Start: 2022-08-12

## 2022-10-05 ENCOUNTER — HOSPITAL ENCOUNTER (OUTPATIENT)
Dept: MAMMOGRAPHY | Facility: HOSPITAL | Age: 52
Discharge: HOME | End: 2022-10-05
Attending: FAMILY MEDICINE
Payer: COMMERCIAL

## 2022-10-05 VITALS — WEIGHT: 273.37 LBS | HEIGHT: 64 IN | BODY MASS INDEX: 46.67 KG/M2

## 2022-10-05 DIAGNOSIS — Z12.31 ENCOUNTER FOR SCREENING MAMMOGRAM FOR BREAST CANCER: ICD-10-CM

## 2022-10-05 PROCEDURE — 77063 BREAST TOMOSYNTHESIS BI: CPT

## 2022-10-05 PROCEDURE — 77067 SCR MAMMO BI INCL CAD: CPT

## 2022-10-08 NOTE — RESULT ENCOUNTER NOTE
Shaq Jacobo,  Your mammogram is normal  We recommend you schedule your next mammogram in one year     Have a good day,   Dr Sonny Ley

## 2022-10-10 ENCOUNTER — HOSPITAL ENCOUNTER (OUTPATIENT)
Dept: GASTROENTEROLOGY | Facility: HOSPITAL | Age: 52
Setting detail: OUTPATIENT SURGERY
Discharge: HOME | End: 2022-10-10
Attending: INTERNAL MEDICINE
Payer: COMMERCIAL

## 2022-10-10 ENCOUNTER — ANESTHESIA (OUTPATIENT)
Dept: GASTROENTEROLOGY | Facility: HOSPITAL | Age: 52
End: 2022-10-10

## 2022-10-10 ENCOUNTER — ANESTHESIA EVENT (OUTPATIENT)
Dept: GASTROENTEROLOGY | Facility: HOSPITAL | Age: 52
End: 2022-10-10

## 2022-10-10 VITALS
SYSTOLIC BLOOD PRESSURE: 147 MMHG | HEART RATE: 75 BPM | RESPIRATION RATE: 17 BRPM | TEMPERATURE: 97 F | OXYGEN SATURATION: 100 % | BODY MASS INDEX: 46.67 KG/M2 | WEIGHT: 273.37 LBS | DIASTOLIC BLOOD PRESSURE: 75 MMHG | HEIGHT: 64 IN

## 2022-10-10 DIAGNOSIS — R19.5 POSITIVE COLORECTAL CANCER SCREENING USING COLOGUARD TEST: ICD-10-CM

## 2022-10-10 DIAGNOSIS — K21.9 GASTROESOPHAGEAL REFLUX DISEASE WITHOUT ESOPHAGITIS: ICD-10-CM

## 2022-10-10 PROCEDURE — 45378 DIAGNOSTIC COLONOSCOPY: CPT | Performed by: INTERNAL MEDICINE

## 2022-10-10 PROCEDURE — 43235 EGD DIAGNOSTIC BRUSH WASH: CPT | Performed by: INTERNAL MEDICINE

## 2022-10-10 RX ORDER — PROPOFOL 10 MG/ML
INJECTION, EMULSION INTRAVENOUS AS NEEDED
Status: DISCONTINUED | OUTPATIENT
Start: 2022-10-10 | End: 2022-10-10

## 2022-10-10 RX ORDER — LIDOCAINE HYDROCHLORIDE 20 MG/ML
INJECTION, SOLUTION EPIDURAL; INFILTRATION; INTRACAUDAL; PERINEURAL AS NEEDED
Status: DISCONTINUED | OUTPATIENT
Start: 2022-10-10 | End: 2022-10-10

## 2022-10-10 RX ORDER — PROPOFOL 10 MG/ML
INJECTION, EMULSION INTRAVENOUS CONTINUOUS PRN
Status: DISCONTINUED | OUTPATIENT
Start: 2022-10-10 | End: 2022-10-10

## 2022-10-10 RX ADMIN — PROPOFOL 30 MG: 10 INJECTION, EMULSION INTRAVENOUS at 12:25

## 2022-10-10 RX ADMIN — PROPOFOL 100 MCG/KG/MIN: 10 INJECTION, EMULSION INTRAVENOUS at 12:28

## 2022-10-10 RX ADMIN — PROPOFOL 60 MG: 10 INJECTION, EMULSION INTRAVENOUS at 12:24

## 2022-10-10 RX ADMIN — LIDOCAINE HYDROCHLORIDE 100 MG: 20 INJECTION, SOLUTION EPIDURAL; INFILTRATION; INTRACAUDAL; PERINEURAL at 12:24

## 2022-10-10 NOTE — H&P
History and Physical -  [facility]  Toma Moore 50 y o  female MRN: [id number]        HPI:  47-year-old female with history of anxiety, GERD was tested positive on the cologuard  Regular bowel movements  Historical Information   Past Medical History:   Diagnosis Date   • Anxiety    • Asthma     childhood   • Depression    • Narcolepsy    • Obesity      Past Surgical History:   Procedure Laterality Date   •  SECTION     • CHOLECYSTECTOMY     • KNEE ARTHROSCOPY Left    • MELISSA-EN-Y PROCEDURE       Social History   Social History     Substance and Sexual Activity   Alcohol Use Not Currently     Social History     Substance and Sexual Activity   Drug Use No     Social History     Tobacco Use   Smoking Status Never Smoker   Smokeless Tobacco Never Used     Family History   Problem Relation Age of Onset   • Hypertension Mother    • Depression Mother    • Atrial fibrillation Mother    • Hypertension Father    • Depression Father    • Heart failure Father         heart attack in 35s   • No Known Problems Sister    • Diabetes Maternal Grandmother    • Stroke Maternal Grandmother    • Brain cancer Maternal Grandfather    • Lung cancer Paternal Grandfather    • No Known Problems Son    • Cystic fibrosis Son    • No Known Problems Maternal Aunt    • No Known Problems Maternal Aunt    • No Known Problems Maternal Aunt    • No Known Problems Maternal Aunt    • No Known Problems Paternal Aunt    • Alcohol abuse Neg Hx    • Substance Abuse Neg Hx    • Mental illness Neg Hx        Meds/Allergies     (Not in a hospital admission)      Allergies   Allergen Reactions   • Codeine GI Intolerance     heartburn   • Morphine Itching and Rash       Objective     Last menstrual period 10/05/2022      PHYSICAL EXAM:    Gen: NAD  CV: S1 & S2 normal, RRR  CHEST: Clear to auscultate  ABD: soft, NT/ND, good bowel sounds  EXT: no edema    ASSESSMENT:     History of GERD, positive cologuard    PLAN:    EGD and colonoscopy

## 2022-10-10 NOTE — ANESTHESIA PREPROCEDURE EVALUATION
Procedure:  COLONOSCOPY  EGD    Relevant Problems   CARDIO   (+) Hypercholesteremia   (+) Migraine with aura and without status migrainosus, not intractable      GI/HEPATIC   (+) GERD (gastroesophageal reflux disease)   (+) Gastroesophageal reflux disease without esophagitis      HEMATOLOGY   (+) Anemia   (+) Iron deficiency anemia      MUSCULOSKELETAL   (+) Tear of left hamstring      NEURO/PSYCH   (+) Anxiety   (+) Migraine with aura and without status migrainosus, not intractable   (+) Severe episode of recurrent major depressive disorder, with psychotic features ([facility] )      PULMONARY   (+) Mild intermittent asthma without complication      Other   (+) Class 3 severe obesity due to excess calories with serious comorbidity in adult ([facility] [address] )   (+) Primary narcolepsy with cataplexy      BMI 46 9    Physical Exam    Airway    Mallampati score: III  TM Distance: >3 FB  Neck ROM: full     Dental   No notable dental hx     Cardiovascular      Pulmonary      Other Findings        Anesthesia Plan  ASA Score- 3     Anesthesia Type- IV sedation with anesthesia with ASA Monitors  Additional Monitors:   Airway Plan:           Plan Factors-    Chart reviewed  Patient summary reviewed  Induction- intravenous  Postoperative Plan-     Informed Consent- Anesthetic plan and risks discussed with patient  I personally reviewed this patient with the CRNA  Discussed and agreed on the Anesthesia Plan with the CRNA  Ngozi Jarquin

## 2022-10-12 ENCOUNTER — TELEPHONE (OUTPATIENT)
Dept: OBGYN CLINIC | Facility: CLINIC | Age: 52
End: 2022-10-12

## 2022-10-12 DIAGNOSIS — N92.1 MENOMETRORRHAGIA: Primary | ICD-10-CM

## 2022-10-12 RX ORDER — MEDROXYPROGESTERONE ACETATE 10 MG/1
10 TABLET ORAL DAILY PRN
Qty: 10 TABLET | Refills: 0 | Status: SHIPPED | OUTPATIENT
Start: 2022-10-12

## 2022-10-12 NOTE — TELEPHONE ENCOUNTER
Pt calling with heavy bleeding states changing maxipad fully saturated every hour since 10/4  Saw Dr Tea Espinosa last year and discussed heavy periods received script for provera but also stated perimenopause and didn't get a period again until after the spring of this year  Pt states cycles last couple months have been heavy but not like this and pt did not take provera until this cycle starting 10/4 and has been taking daily and no improvement in bleeding  Pt denies any cramping, dizziness, lightheadedness, sob, fatigue, cp  Pt states has been waking up multiple times throughout the night with saturated pad and having to change  TT sent to on call provider to review  Received provider recommendations for pt to take provera 10mg twice a day for the next 3 days and to complete blood work, cbc and tsh ordered, pt confirms will complete at [place] [address] lab  Pt verbalzies understanding of recommendations and will review labs once completed, will monitor schedule to offer soonest available appt to f/u, and if no improvement or worsening bleeding or symptoms to call back  Pt requesting script for provera sent to [facility] pharmacy on [address] as pharmacy unable to refill as order d/c'd in system  Script sent to provider to sign  Pt has no further questions at this time

## 2022-10-12 NOTE — TELEPHONE ENCOUNTER
Pt lmom - sees Dr Ary White and Dr Ary White gave her medication to slow down menstrual cycle because has such a heavy bleed and lasts for such a long time  Has taken all of the pills she gave her and still bleeding really badly and not sure what to do at this point

## 2022-10-13 DIAGNOSIS — G47.419 PRIMARY NARCOLEPSY WITHOUT CATAPLEXY: ICD-10-CM

## 2022-10-13 DIAGNOSIS — G47.411 NARCOLEPSY AND CATAPLEXY: ICD-10-CM

## 2022-10-13 RX ORDER — SODIUM OXYBATE 0.5 G/ML
SOLUTION ORAL
Qty: 540 ML | Refills: 5 | Status: SHIPPED | OUTPATIENT
Start: 2022-10-13

## 2022-10-13 NOTE — TELEPHONE ENCOUNTER
Received auto-generated fax from [address] [facility] for refill Rx of Sodium Oxybate (Xyrem) 500 MG/ML SOLN    Last office visit 3/1/2022 with Dr Amrik Fong  Next office visit 3/1/2023 with Dr Amrik Fong   --------------------------------------------------------  Dr Amrik Fong, please review and sign if appropriate  Thank you

## 2022-10-24 ENCOUNTER — HOSPITAL ENCOUNTER (EMERGENCY)
Facility: HOSPITAL | Age: 52
Discharge: HOME | End: 2022-10-24
Attending: EMERGENCY MEDICINE
Payer: COMMERCIAL

## 2022-10-24 ENCOUNTER — APPOINTMENT (OUTPATIENT)
Dept: LAB | Facility: AMBULARY SURGERY CENTER | Age: 52
End: 2022-10-24
Attending: STUDENT IN AN ORGANIZED HEALTH CARE EDUCATION/TRAINING PROGRAM
Payer: COMMERCIAL

## 2022-10-24 ENCOUNTER — TELEPHONE (OUTPATIENT)
Dept: OBGYN CLINIC | Facility: CLINIC | Age: 52
End: 2022-10-24

## 2022-10-24 VITALS
RESPIRATION RATE: 20 BRPM | TEMPERATURE: 97.7 F | DIASTOLIC BLOOD PRESSURE: 81 MMHG | HEART RATE: 81 BPM | SYSTOLIC BLOOD PRESSURE: 120 MMHG | OXYGEN SATURATION: 98 %

## 2022-10-24 DIAGNOSIS — N92.1 MENOMETRORRHAGIA: ICD-10-CM

## 2022-10-24 DIAGNOSIS — R79.89 ELEVATED LFTS: ICD-10-CM

## 2022-10-24 DIAGNOSIS — N93.9 VAGINAL BLEEDING: Primary | ICD-10-CM

## 2022-10-24 DIAGNOSIS — N93.9 EPISODE OF HEAVY VAGINAL BLEEDING: Primary | ICD-10-CM

## 2022-10-24 LAB
ABO GROUP BLD: NORMAL
ALBUMIN SERPL BCP-MCNC: 2.9 G/DL (ref 3.5–5)
ALBUMIN SERPL BCP-MCNC: 3.5 G/DL (ref 3.5–5)
ALP SERPL-CCNC: 132 U/L (ref 34–104)
ALP SERPL-CCNC: 144 U/L (ref 46–116)
ALT SERPL W P-5'-P-CCNC: 16 U/L (ref 12–78)
ALT SERPL W P-5'-P-CCNC: 8 U/L (ref 7–52)
ANION GAP SERPL CALCULATED.3IONS-SCNC: 6 MMOL/L (ref 4–13)
APTT PPP: 28 SECONDS (ref 23–37)
AST SERPL W P-5'-P-CCNC: 10 U/L (ref 13–39)
AST SERPL W P-5'-P-CCNC: 9 U/L (ref 5–45)
BASOPHILS # BLD AUTO: 0.05 THOUSANDS/ÂΜL (ref 0–0.1)
BASOPHILS # BLD AUTO: 0.05 THOUSANDS/ÂΜL (ref 0–0.1)
BASOPHILS NFR BLD AUTO: 1 % (ref 0–1)
BASOPHILS NFR BLD AUTO: 1 % (ref 0–1)
BILIRUB DIRECT SERPL-MCNC: <0.05 MG/DL (ref 0–0.2)
BILIRUB SERPL-MCNC: 0.17 MG/DL (ref 0.2–1)
BILIRUB SERPL-MCNC: 0.22 MG/DL (ref 0.2–1)
BLD GP AB SCN SERPL QL: NEGATIVE
BUN SERPL-MCNC: 11 MG/DL (ref 5–25)
CALCIUM SERPL-MCNC: 8.5 MG/DL (ref 8.4–10.2)
CHLORIDE SERPL-SCNC: 109 MMOL/L (ref 96–108)
CO2 SERPL-SCNC: 25 MMOL/L (ref 21–32)
CREAT SERPL-MCNC: 0.67 MG/DL (ref 0.6–1.3)
EOSINOPHIL # BLD AUTO: 0.01 THOUSAND/ÂΜL (ref 0–0.61)
EOSINOPHIL # BLD AUTO: 0.03 THOUSAND/ÂΜL (ref 0–0.61)
EOSINOPHIL NFR BLD AUTO: 0 % (ref 0–6)
EOSINOPHIL NFR BLD AUTO: 0 % (ref 0–6)
ERYTHROCYTE [DISTWIDTH] IN BLOOD BY AUTOMATED COUNT: 13.4 % (ref 11.6–15.1)
ERYTHROCYTE [DISTWIDTH] IN BLOOD BY AUTOMATED COUNT: 13.6 % (ref 11.6–15.1)
GFR SERPL CREATININE-BSD FRML MDRD: 102 ML/MIN/1.73SQ M
GLUCOSE SERPL-MCNC: 142 MG/DL (ref 65–140)
HCT VFR BLD AUTO: 39 % (ref 34.8–46.1)
HCT VFR BLD AUTO: 40 % (ref 34.8–46.1)
HGB BLD-MCNC: 12.2 G/DL (ref 11.5–15.4)
HGB BLD-MCNC: 12.2 G/DL (ref 11.5–15.4)
IMM GRANULOCYTES # BLD AUTO: 0.02 THOUSAND/UL (ref 0–0.2)
IMM GRANULOCYTES # BLD AUTO: 0.03 THOUSAND/UL (ref 0–0.2)
IMM GRANULOCYTES NFR BLD AUTO: 0 % (ref 0–2)
IMM GRANULOCYTES NFR BLD AUTO: 0 % (ref 0–2)
INR PPP: 0.94 (ref 0.84–1.19)
LIPASE SERPL-CCNC: 11 U/L (ref 11–82)
LYMPHOCYTES # BLD AUTO: 2.12 THOUSANDS/ÂΜL (ref 0.6–4.47)
LYMPHOCYTES # BLD AUTO: 2.31 THOUSANDS/ÂΜL (ref 0.6–4.47)
LYMPHOCYTES NFR BLD AUTO: 29 % (ref 14–44)
LYMPHOCYTES NFR BLD AUTO: 31 % (ref 14–44)
MCH RBC QN AUTO: 27.6 PG (ref 26.8–34.3)
MCH RBC QN AUTO: 28 PG (ref 26.8–34.3)
MCHC RBC AUTO-ENTMCNC: 30.5 G/DL (ref 31.4–37.4)
MCHC RBC AUTO-ENTMCNC: 31.3 G/DL (ref 31.4–37.4)
MCV RBC AUTO: 89 FL (ref 82–98)
MCV RBC AUTO: 91 FL (ref 82–98)
MONOCYTES # BLD AUTO: 0.55 THOUSAND/ÂΜL (ref 0.17–1.22)
MONOCYTES # BLD AUTO: 0.58 THOUSAND/ÂΜL (ref 0.17–1.22)
MONOCYTES NFR BLD AUTO: 7 % (ref 4–12)
MONOCYTES NFR BLD AUTO: 8 % (ref 4–12)
NEUTROPHILS # BLD AUTO: 4.18 THOUSANDS/ÂΜL (ref 1.85–7.62)
NEUTROPHILS # BLD AUTO: 5.03 THOUSANDS/ÂΜL (ref 1.85–7.62)
NEUTS SEG NFR BLD AUTO: 60 % (ref 43–75)
NEUTS SEG NFR BLD AUTO: 63 % (ref 43–75)
NRBC BLD AUTO-RTO: 0 /100 WBCS
NRBC BLD AUTO-RTO: 0 /100 WBCS
PLATELET # BLD AUTO: 332 THOUSANDS/UL (ref 149–390)
PLATELET # BLD AUTO: 342 THOUSANDS/UL (ref 149–390)
PMV BLD AUTO: 10.4 FL (ref 8.9–12.7)
PMV BLD AUTO: 11.4 FL (ref 8.9–12.7)
POTASSIUM SERPL-SCNC: 4.2 MMOL/L (ref 3.5–5.3)
PROT SERPL-MCNC: 6.8 G/DL (ref 6.4–8.4)
PROT SERPL-MCNC: 6.8 G/DL (ref 6.4–8.4)
PROTHROMBIN TIME: 12.7 SECONDS (ref 11.6–14.5)
RBC # BLD AUTO: 4.36 MILLION/UL (ref 3.81–5.12)
RBC # BLD AUTO: 4.42 MILLION/UL (ref 3.81–5.12)
RH BLD: NEGATIVE
SODIUM SERPL-SCNC: 140 MMOL/L (ref 135–147)
SPECIMEN EXPIRATION DATE: NORMAL
TSH SERPL DL<=0.05 MIU/L-ACNC: 2.28 UIU/ML (ref 0.45–4.5)
WBC # BLD AUTO: 6.93 THOUSAND/UL (ref 4.31–10.16)
WBC # BLD AUTO: 8.03 THOUSAND/UL (ref 4.31–10.16)

## 2022-10-24 PROCEDURE — 85730 THROMBOPLASTIN TIME PARTIAL: CPT | Performed by: EMERGENCY MEDICINE

## 2022-10-24 PROCEDURE — 85025 COMPLETE CBC W/AUTO DIFF WBC: CPT | Performed by: EMERGENCY MEDICINE

## 2022-10-24 PROCEDURE — 85025 COMPLETE CBC W/AUTO DIFF WBC: CPT

## 2022-10-24 PROCEDURE — 36415 COLL VENOUS BLD VENIPUNCTURE: CPT

## 2022-10-24 PROCEDURE — 85610 PROTHROMBIN TIME: CPT | Performed by: EMERGENCY MEDICINE

## 2022-10-24 PROCEDURE — 86901 BLOOD TYPING SEROLOGIC RH(D): CPT | Performed by: EMERGENCY MEDICINE

## 2022-10-24 PROCEDURE — 86850 RBC ANTIBODY SCREEN: CPT | Performed by: EMERGENCY MEDICINE

## 2022-10-24 PROCEDURE — 84443 ASSAY THYROID STIM HORMONE: CPT

## 2022-10-24 PROCEDURE — 86900 BLOOD TYPING SEROLOGIC ABO: CPT | Performed by: EMERGENCY MEDICINE

## 2022-10-24 PROCEDURE — 80076 HEPATIC FUNCTION PANEL: CPT

## 2022-10-24 PROCEDURE — 83690 ASSAY OF LIPASE: CPT | Performed by: EMERGENCY MEDICINE

## 2022-10-24 PROCEDURE — 99284 EMERGENCY DEPT VISIT MOD MDM: CPT | Performed by: EMERGENCY MEDICINE

## 2022-10-24 PROCEDURE — 80053 COMPREHEN METABOLIC PANEL: CPT | Performed by: EMERGENCY MEDICINE

## 2022-10-24 RX ORDER — TRANEXAMIC ACID 650 MG/1
650 TABLET ORAL 2 TIMES DAILY
Qty: 10 TABLET | Refills: 0 | Status: SHIPPED | OUTPATIENT
Start: 2022-10-24 | End: 2022-10-29

## 2022-10-24 NOTE — TELEPHONE ENCOUNTER
Pt called back with "severe menstrual cycle"  She states that she is changing her pad every 30 minutes at this point and feels like she needs a diaper  Did complete labs today but aware results are not back yet  Pt aware to ER for evaluation with bleeding as excessive as she is having  ADT21 entered and Dr Author Sanchez made aware

## 2022-10-24 NOTE — TELEPHONE ENCOUNTER
Pt c/o heavy bleeding states changing maxipad every hour since 10/4  Pt called 10/12 with same symptoms and advised provera 10mg twice a day for 3 days by provider, pt states she took the medication as instructed and states "didn't really help, maybe lessened bleeding a little bit but still has been bad " pt denies any dizziness, fatigue, lightheadedness but does report intermittent sob  labs previously ordered but pt states she has been unable to get them completed  TT sent to on call provider at this time

## 2022-10-24 NOTE — TELEPHONE ENCOUNTER
Reviewed on call provider's recommendations with pt to ED for sob evaluation, pt hesitant to go to ED but verbalizes understanding of recommendations to ED or if continued or worsening sob, increased bleeding or any other symptoms to ED  Pt states she will complete labs previously ordered but verbalizes understanding of recommendations  Soonest available appt offered to pt and scheduled  Pt aware can call for cancellations to be seen sooner if available and aware provider will review blood work results once completed  Pt verbalizes understanding of recommendations and to call if any questions or concerns prior to appt

## 2022-10-24 NOTE — TELEPHONE ENCOUNTER
PT called with complains of none stop heavy bleeding since October 4th  Pt took the medication that was sent for her and It did not help her with her symptoms  Would like to speak to someone

## 2022-10-25 NOTE — ED PROVIDER NOTES
History  Chief Complaint   Patient presents with   • Vaginal Bleeding     States she has had heavy vaginal bleeding since       50year-old female with history of dysfunctional uterine bleeding presents for evaluation of 1 week of vaginal bleeding  Patient reports going through approximately 1 pad every 2 hours  No pelvic pain  No abdominal pain  No nausea vomitin  Patient states she she had evaluation for endometrial cancer recently which was negative  Patient denies any lightheadedness  No chest pain shortness of breath  No nausea vomiting  Vaginal Bleeding  Associated symptoms: no dizziness, no dyspareunia, no dysuria and no fatigue        Prior to Admission Medications   Prescriptions Last Dose Informant Patient Reported? Taking? ALPRAZolam (XANAX) 1 mg tablet  Self Yes No   Sig: TAKE 1 TO 2 TABLETS BY MOUTH DAILY AS NEEDED   Albuterol Sulfate (ProAir RespiClick) 489 (90 Base) MCG/ACT AEPB  Self No No   Sig: Inhale 1 puff every 4 (four) hours as needed (wheezing)   NEEDLE, DISP, 30 G (BD Eclipse Needle) 30G X 1/2" MISC  Self No No   Sig: Use daily   Patient not taking: Reported on 2022   Sod Picosulfate-Mag Ox-Cit Acd (Clenpiq) 10-3 5-12 MG-GM -GM/160ML SOLN   No No   Sig: Take 1 Package by mouth see administration instructions   Sodium Oxybate (Xyrem) 500 MG/ML SOLN   No No   Sig: First dose (bedtime): 4 5 g + Second dose (2 5-4 hrs later): 4 5 g = 9 g Total Nightly Dose [Total Qty: 1 month(s)]   clonazePAM (KlonoPIN) 0 5 mg tablet  Self Yes No   Sig: Take 0 5 mg by mouth 2 (two) times a day as needed   clonazePAM (KlonoPIN) 2 mg tablet  Self Yes No   Sig: TAKE 1/2 TABLET BY MOUTH FOUR TIMES DAILY   MAY USE WITH ALPRAZOLAM   cyanocobalamin 1,000 mcg/mL  Self No No   Si ml SQ Every 2 weeks with syringe and needles   econazole nitrate 1 % cream  Self No No   Sig: Apply topically daily   ergocalciferol (VITAMIN D2) 50,000 units  Self No No   Sig: Take 1 capsule (50,000 Units total) by mouth every 14 (fourteen) days   famotidine (PEPCID) 20 mg tablet   No No   Sig: TAKE 1 TABLET(20 MG) BY MOUTH TWICE DAILY   ferrous sulfate 325 (65 Fe) mg tablet  Self No No   Sig: Take 1 tablet (325 mg total) by mouth daily with breakfast   loxapine (LOXITANE) 10 mg capsule  Self No No   Si in am, 1 in afternoon, 3 before bed - per Dr Kalpesh Burnette   loxapine Alberta Bigger) 5 mg capsule  Self Yes No   Sig: Take 5 mg by mouth in the morning   medroxyPROGESTERone (PROVERA) 10 mg tablet   No No   Sig: Take 1 tablet (10 mg total) by mouth daily as needed (for heavy or prolonged bleeding)   metoprolol tartrate (LOPRESSOR) 50 mg tablet   No No   Sig: Take 1 tablet (50 mg total) by mouth 2 (two) times a day   venlafaxine (EFFEXOR) 100 MG tablet  Self No No   Sig: Take 1 tablet (100 mg total) by mouth 3 (three) times a day      Facility-Administered Medications: None       Past Medical History:   Diagnosis Date   • Anxiety    • Asthma     childhood   • Depression    • Narcolepsy    • Obesity        Past Surgical History:   Procedure Laterality Date   •  SECTION     • CHOLECYSTECTOMY     • KNEE ARTHROSCOPY Left    • MELISSA-EN-Y PROCEDURE         Family History   Problem Relation Age of Onset   • Hypertension Mother    • Depression Mother    • Atrial fibrillation Mother    • Hypertension Father    • Depression Father    • Heart failure Father         heart attack in 35s   • No Known Problems Sister    • Diabetes Maternal Grandmother    • Stroke Maternal Grandmother    • Brain cancer Maternal Grandfather    • Lung cancer Paternal Grandfather    • No Known Problems Son    • Cystic fibrosis Son    • No Known Problems Maternal Aunt    • No Known Problems Maternal Aunt    • No Known Problems Maternal Aunt    • No Known Problems Maternal Aunt    • No Known Problems Paternal Aunt    • Alcohol abuse Neg Hx    • Substance Abuse Neg Hx    • Mental illness Neg Hx      I have reviewed and agree with the history as documented  E-Cigarette/Vaping   • E-Cigarette Use Never User      E-Cigarette/Vaping Substances   • Nicotine No    • THC No    • CBD No    • Flavoring No      Social History     Tobacco Use   • Smoking status: Never Smoker   • Smokeless tobacco: Never Used   Vaping Use   • Vaping Use: Never used   Substance Use Topics   • Alcohol use: Not Currently   • Drug use: No       Review of Systems   Constitutional: Negative for activity change, appetite change, chills, fatigue and unexpected weight change  HENT: Negative for congestion, dental problem, ear discharge, ear pain, facial swelling, hearing loss, mouth sores, nosebleeds, rhinorrhea, sinus pressure, sinus pain and sneezing  Eyes: Negative for photophobia, pain, discharge, redness, itching and visual disturbance  Respiratory: Negative for apnea, cough, choking, chest tightness, shortness of breath and wheezing  Cardiovascular: Negative for chest pain, palpitations and leg swelling  Endocrine: Negative for cold intolerance and heat intolerance  Genitourinary: Positive for vaginal bleeding  Negative for difficulty urinating, dyspareunia and dysuria  Musculoskeletal: Negative for arthralgias  Skin: Negative for color change  Allergic/Immunologic: Negative for environmental allergies  Neurological: Negative for dizziness, seizures, facial asymmetry, light-headedness, numbness and headaches  Hematological: Negative for adenopathy  Does not bruise/bleed easily  Psychiatric/Behavioral: Negative for agitation  Physical Exam  Physical Exam  Constitutional:       Appearance: Normal appearance  HENT:      Head: Normocephalic and atraumatic  Nose: Nose normal       Mouth/Throat:      Mouth: Mucous membranes are dry  Eyes:      Extraocular Movements: Extraocular movements intact  Conjunctiva/sclera: Conjunctivae normal       Pupils: Pupils are equal, round, and reactive to light     Cardiovascular:      Rate and Rhythm: Normal rate and regular rhythm  Pulses: Normal pulses  Heart sounds: Normal heart sounds  Pulmonary:      Effort: Pulmonary effort is normal       Breath sounds: Normal breath sounds  Abdominal:      General: There is no distension  Palpations: Abdomen is soft  Tenderness: There is no abdominal tenderness  Musculoskeletal:         General: No swelling or deformity  Normal range of motion  Cervical back: Normal range of motion and neck supple  Skin:     General: Skin is warm and dry  Capillary Refill: Capillary refill takes less than 2 seconds  Coloration: Skin is not jaundiced or pale  Findings: No bruising or erythema  Neurological:      General: No focal deficit present  Mental Status: She is alert           Vital Signs  ED Triage Vitals   Temperature Pulse Respirations Blood Pressure SpO2   10/24/22 1746 10/24/22 1746 10/24/22 1746 10/24/22 1746 10/24/22 1746   97 7 °F (36 5 °C) 70 22 136/83 99 %      Temp Source Heart Rate Source Patient Position - Orthostatic VS BP Location FiO2 (%)   10/24/22 1746 10/24/22 1746 -- 10/24/22 1746 --   Oral Monitor  Right arm       Pain Score       10/24/22 2029       6           Vitals:    10/24/22 1746 10/24/22 2045 10/24/22 2200   BP: 136/83 136/65 120/81   Pulse: 70 90 81         Visual Acuity      ED Medications  Medications - No data to display    Diagnostic Studies  Results Reviewed     Procedure Component Value Units Date/Time    Comprehensive metabolic panel [[id number]]  (Abnormal) Collected: 10/24/22 2034    Lab Status: Final result Specimen: Blood from Arm, Left Updated: 10/24/22 2112     Sodium 140 mmol/L      Potassium 4 2 mmol/L      Chloride 109 mmol/L      CO2 25 mmol/L      ANION GAP 6 mmol/L      BUN 11 mg/dL      Creatinine 0 67 mg/dL      Glucose 142 mg/dL      Calcium 8 5 mg/dL      AST 10 U/L      ALT 8 U/L      Alkaline Phosphatase 132 U/L      Total Protein 6 8 g/dL      Albumin 3 5 g/dL      Total Bilirubin 0 17 mg/dL      eGFR 102 ml/min/1 73sq m     Narrative:      [place] guidelines for Chronic Kidney Disease (CKD):   •  Stage 1 with normal or high GFR (GFR > 90 mL/min/1 73 square meters)  •  Stage 2 Mild CKD (GFR = 60-89 mL/min/1 73 square meters)  •  Stage 3A Moderate CKD (GFR = 45-59 mL/min/1 73 square meters)  •  Stage 3B Moderate CKD (GFR = 30-44 mL/min/1 73 square meters)  •  Stage 4 Severe CKD (GFR = 15-29 mL/min/1 73 square meters)  •  Stage 5 End Stage CKD (GFR <15 mL/min/1 73 square meters)  Note: GFR calculation is accurate only with a steady state creatinine    Lipase [[id number]]  (Normal) Collected: 10/24/22 2034    Lab Status: Final result Specimen: Blood from Arm, Left Updated: 10/24/22 2112     Lipase 11 u/L     Protime-INR [[id number]]  (Normal) Collected: 10/24/22 2044    Lab Status: Final result Specimen: Blood from Arm, Left Updated: 10/24/22 2108     Protime 12 7 seconds      INR 0 94    APTT [[id number]]  (Normal) Collected: 10/24/22 2044    Lab Status: Final result Specimen: Blood from Arm, Left Updated: 10/24/22 2108     PTT 28 seconds     CBC and differential [[id number]]  (Abnormal) Collected: 10/24/22 2034    Lab Status: Final result Specimen: Blood from Arm, Left Updated: 10/24/22 2055     WBC 8 03 Thousand/uL      RBC 4 36 Million/uL      Hemoglobin 12 2 g/dL      Hematocrit 39 0 %      MCV 89 fL      MCH 28 0 pg      MCHC 31 3 g/dL      RDW 13 6 %      MPV 10 4 fL      Platelets 076 Thousands/uL      nRBC 0 /100 WBCs      Neutrophils Relative 63 %      Immat GRANS % 0 %      Lymphocytes Relative 29 %      Monocytes Relative 7 %      Eosinophils Relative 0 %      Basophils Relative 1 %      Neutrophils Absolute 5 03 Thousands/µL      Immature Grans Absolute 0 03 Thousand/uL      Lymphocytes Absolute 2 31 Thousands/µL      Monocytes Absolute 0 58 Thousand/µL      Eosinophils Absolute 0 03 Thousand/µL      Basophils Absolute 0 05 Thousands/µL                  No orders to display              Procedures  Procedures         ED Course        H&H is normal  Pt is comfortable going home  Will prescribe oral txa pills and recommend prompt OBGYN f/u within 48 hrs  Pt agreed w the plan  Full DC instructions discussed and patient knows when to seek immediate medical attention  Patient has proper follow-up  All the results discussed with the patient and patient knows that they may require further workup  Limitation of the ED workup discussed  All questions and concerns from patient or family addressed  Understanding of the instructions verbalized  MDM  Number of Diagnoses or Management Options  Vaginal bleeding: new and requires workup     Amount and/or Complexity of Data Reviewed  Clinical lab tests: ordered and reviewed  Tests in the medicine section of CPT®: ordered and reviewed    Risk of Complications, Morbidity, and/or Mortality  Presenting problems: high  Diagnostic procedures: high  Management options: high        Disposition  Final diagnoses:   Vaginal bleeding     Time reflects when diagnosis was documented in both MDM as applicable and the Disposition within this note     Time User Action Codes Description Comment    10/24/2022 11:00 PM Bryanl Guanacoe Add [N93 9] Vaginal bleeding       ED Disposition     ED Disposition   Discharge    Condition   Stable    Date/Time   Mon Oct 24, 2022 11:00 PM    [address]  discharge to home/self care                 Follow-up Information     Follow up With Specialties Details Why Contact Info Additional Information    [address] [facility] Obstetrics and Gynecology Schedule an appointment as soon as possible for a visit in 1 day  [address]  [id number]  [address] [place] [address] [facility], [facility] [place], [place], [address] [phone]          Patient's Medications   Discharge Prescriptions    TRANEXAMIC ACID 650 MG TABS    Take 1 tablet (650 mg total) by mouth 2 (two) times a day for 5 days       Start Date: 10/24/2022End Date: 10/29/2022       Order Dose: 650 mg       Quantity: 10 tablet    Refills: 0       No discharge procedures on file      PDMP Review     None          ED Provider  Electronically Signed by           Erna Haney DO  10/24/22 0706

## 2022-10-26 ENCOUNTER — TELEPHONE (OUTPATIENT)
Dept: OBGYN CLINIC | Facility: CLINIC | Age: 52
End: 2022-10-26

## 2022-10-26 ENCOUNTER — IMMUNIZATIONS (OUTPATIENT)
Dept: FAMILY MEDICINE CLINIC | Facility: CLINIC | Age: 52
End: 2022-10-26
Payer: COMMERCIAL

## 2022-10-26 DIAGNOSIS — Z23 NEED FOR INFLUENZA VACCINATION: Primary | ICD-10-CM

## 2022-10-26 PROCEDURE — 90471 IMMUNIZATION ADMIN: CPT

## 2022-10-26 PROCEDURE — 90682 RIV4 VACC RECOMBINANT DNA IM: CPT

## 2022-10-26 NOTE — TELEPHONE ENCOUNTER
Pt lmom - was in ER, spent hours there and was to schedule follow up in a day or two  Still bleeding and cramping

## 2022-10-26 NOTE — TELEPHONE ENCOUNTER
Spoke to patient, told her that unfortunately we don't have any appts to offer her right now  she said she is bleeding through 1 pad every 2 hours and feeling lightheaded, dizzy and SOB  She said these are the same symptoms she felt yesterday when she was seen in the ER  She said the symptoms did not improve or get worse  Told her if symptoms get worse or bleeding gets worse to give us a call back and we will review with provider  Told her to finish medication  PT has a follow up appt in November

## 2022-11-08 ENCOUNTER — OFFICE VISIT (OUTPATIENT)
Dept: FAMILY MEDICINE CLINIC | Facility: CLINIC | Age: 52
End: 2022-11-08

## 2022-11-08 VITALS
SYSTOLIC BLOOD PRESSURE: 108 MMHG | DIASTOLIC BLOOD PRESSURE: 74 MMHG | RESPIRATION RATE: 20 BRPM | OXYGEN SATURATION: 97 % | HEART RATE: 93 BPM | HEIGHT: 64 IN | TEMPERATURE: 97.9 F | WEIGHT: 273 LBS | BODY MASS INDEX: 46.61 KG/M2

## 2022-11-08 DIAGNOSIS — B34.9 VIRAL ILLNESS: Primary | ICD-10-CM

## 2022-11-08 RX ORDER — ALBUTEROL SULFATE 90 UG/1
2 AEROSOL, METERED RESPIRATORY (INHALATION) EVERY 4 HOURS PRN
Qty: 18 G | Refills: 0 | Status: SHIPPED | OUTPATIENT
Start: 2022-11-08

## 2022-11-08 RX ORDER — BENZONATATE 100 MG/1
100 CAPSULE ORAL 3 TIMES DAILY PRN
Qty: 20 CAPSULE | Refills: 0 | Status: SHIPPED | OUTPATIENT
Start: 2022-11-08

## 2022-11-08 RX ORDER — BENZTROPINE MESYLATE 1 MG/1
1 TABLET ORAL 2 TIMES DAILY
COMMUNITY

## 2022-11-08 NOTE — PROGRESS NOTES
Name: Melvin Elizabeth      : 1970      MRN: [id number]  Encounter Provider: Wilmer Hughes DO  Encounter Date: 2022   Encounter department: [address] [place] [address]  Viral illness  Comments:  likely viral  will send out covid pcr given 's illness--pt agrees to paxlovid if she is positive  otherwise, rest, fluids, tessalon perles  discussed using flovent for prevention and albuterol for shortness of breath or to see if it helps with cough    Orders:  -     Covid/Flu- Office Collect  -     benzonatate (TESSALON PERLES) 100 mg capsule; Take 1 capsule (100 mg total) by mouth 3 (three) times a day as needed for cough  -     albuterol (Ventolin HFA) 90 mcg/act inhaler; Inhale 2 puffs every 4 (four) hours as needed for wheezing           Subjective      HPI   Pt c/o cough starting   Cough causes chest pain/sob while coughing but not otherwise  Mild nasal congestion  St and lightheadedness while coughing  Has taken mucinex and alkaseltzer cold and flu without improvement   is covid + at home  No loss of taste or smell  No chest pain  No shortness of breath  Nausea with cough  No vomiting or diarrhea/constipation  Pt states she is using flovent every 2 hours but it is not helpful  She is not using albuterol  Review of Systems  See hpi    Current Outpatient Medications on File Prior to Visit   Medication Sig   • benztropine (COGENTIN) 1 mg tablet Take 1 mg by mouth 2 (two) times a day   • ALPRAZolam (XANAX) 1 mg tablet TAKE 1 TO 2 TABLETS BY MOUTH DAILY AS NEEDED   • clonazePAM (KlonoPIN) 0 5 mg tablet Take 0 5 mg by mouth 2 (two) times a day as needed   • clonazePAM (KlonoPIN) 2 mg tablet TAKE 1/2 TABLET BY MOUTH FOUR TIMES DAILY   MAY USE WITH ALPRAZOLAM   • cyanocobalamin 1,000 mcg/mL 1 ml SQ Every 2 weeks with syringe and needles   • econazole nitrate 1 % cream Apply topically daily   • ergocalciferol (VITAMIN D2) 50,000 units Take 1 capsule (50,000 Units total) by mouth every 14 (fourteen) days   • famotidine (PEPCID) 20 mg tablet TAKE 1 TABLET(20 MG) BY MOUTH TWICE DAILY   • ferrous sulfate 325 (65 Fe) mg tablet Take 1 tablet (325 mg total) by mouth daily with breakfast   • loxapine (LOXITANE) 10 mg capsule 1 in am, 1 in afternoon, 3 before bed - per Dr Donavan Mcmanus (Patient not taking: Reported on 11/8/2022)   • loxapine (LOXITANE) 5 mg capsule Take 5 mg by mouth in the morning (Patient not taking: Reported on 11/8/2022)   • medroxyPROGESTERone (PROVERA) 10 mg tablet Take 1 tablet (10 mg total) by mouth daily as needed (for heavy or prolonged bleeding)   • metoprolol tartrate (LOPRESSOR) 50 mg tablet Take 1 tablet (50 mg total) by mouth 2 (two) times a day   • NEEDLE, DISP, 30 G (BD Eclipse Needle) 30G X 1/2" MISC Use daily (Patient not taking: Reported on 7/26/2022)   • Sod Picosulfate-Mag Ox-Cit Acd (Clenpiq) 10-3 5-12 MG-GM -GM/160ML SOLN Take 1 Package by mouth see administration instructions   • Sodium Oxybate (Xyrem) 500 MG/ML SOLN First dose (bedtime): 4 5 g + Second dose (2 5-4 hrs later): 4 5 g = 9 g Total Nightly Dose [Total Qty: 1 month(s)]   • venlafaxine (EFFEXOR) 100 MG tablet Take 1 tablet (100 mg total) by mouth 3 (three) times a day   • [DISCONTINUED] Albuterol Sulfate (ProAir RespiClick) 496 (90 Base) MCG/ACT AEPB Inhale 1 puff every 4 (four) hours as needed (wheezing)       Objective     /74   Pulse 93   Temp 97 9 °F (36 6 °C)   Resp 20   Ht 5' 4" (1 626 m)   Wt 124 kg (273 lb)   SpO2 97%   BMI 46 86 kg/m²     Physical Exam  Constitutional:       General: She is not in acute distress  Appearance: She is well-developed  HENT:      Head: Normocephalic and atraumatic  Right Ear: Tympanic membrane, ear canal and external ear normal       Left Ear: Tympanic membrane, ear canal and external ear normal       Nose: Mucosal edema present  Mouth/Throat:      Pharynx: Posterior oropharyngeal erythema present  No oropharyngeal exudate  Eyes:      Conjunctiva/sclera: Conjunctivae normal       Pupils: Pupils are equal, round, and reactive to light  Cardiovascular:      Rate and Rhythm: Regular rhythm  Heart sounds: S1 normal and S2 normal  No murmur heard  No friction rub  No gallop  No S3 or S4 sounds  Pulmonary:      Effort: Pulmonary effort is normal       Breath sounds: Normal breath sounds  No wheezing or rhonchi  Lymphadenopathy:      Cervical: Cervical adenopathy present         Courtney Abarca DO

## 2022-11-08 NOTE — PATIENT INSTRUCTIONS
Continue flovent  Albuterol as needed (sent to pharmacy)   We'll send flu/covid test  Tessalon perles as needed for cough

## 2022-11-09 ENCOUNTER — TELEPHONE (OUTPATIENT)
Dept: FAMILY MEDICINE CLINIC | Facility: CLINIC | Age: 52
End: 2022-11-09

## 2022-11-09 DIAGNOSIS — U07.1 COVID-19: Primary | ICD-10-CM

## 2022-11-09 LAB
FLUAV RNA RESP QL NAA+PROBE: NEGATIVE
FLUBV RNA RESP QL NAA+PROBE: NEGATIVE
SARS-COV-2 RNA RESP QL NAA+PROBE: POSITIVE

## 2022-11-09 RX ORDER — NIRMATRELVIR AND RITONAVIR 300-100 MG
3 KIT ORAL 2 TIMES DAILY
Qty: 30 TABLET | Refills: 0 | Status: SHIPPED | OUTPATIENT
Start: 2022-11-09 | End: 2022-11-14

## 2022-11-09 NOTE — TELEPHONE ENCOUNTER
Patient called, her COVID test came back positive and she would like to know if [facility] would [facility] as she is feeling worse today and is really sick,  Please advise

## 2022-12-06 ENCOUNTER — APPOINTMENT (EMERGENCY)
Dept: RADIOLOGY | Facility: HOSPITAL | Age: 52
End: 2022-12-06

## 2022-12-06 ENCOUNTER — HOSPITAL ENCOUNTER (EMERGENCY)
Facility: HOSPITAL | Age: 52
Discharge: HOME | End: 2022-12-07
Attending: EMERGENCY MEDICINE

## 2022-12-06 ENCOUNTER — APPOINTMENT (EMERGENCY)
Dept: CT IMAGING | Facility: HOSPITAL | Age: 52
End: 2022-12-06

## 2022-12-06 ENCOUNTER — TELEPHONE (OUTPATIENT)
Dept: FAMILY MEDICINE CLINIC | Facility: CLINIC | Age: 52
End: 2022-12-06

## 2022-12-06 DIAGNOSIS — R10.9 ABDOMINAL PAIN: Primary | ICD-10-CM

## 2022-12-06 DIAGNOSIS — N39.0 UTI (URINARY TRACT INFECTION): ICD-10-CM

## 2022-12-06 LAB
2HR DELTA HS TROPONIN: 2 NG/L
ALBUMIN SERPL BCP-MCNC: 3.9 G/DL (ref 3.5–5)
ALP SERPL-CCNC: 199 U/L (ref 34–104)
ALT SERPL W P-5'-P-CCNC: 14 U/L (ref 7–52)
ANION GAP SERPL CALCULATED.3IONS-SCNC: 12 MMOL/L (ref 4–13)
APTT PPP: 29 SECONDS (ref 23–37)
AST SERPL W P-5'-P-CCNC: 21 U/L (ref 13–39)
BASOPHILS # BLD AUTO: 0.03 THOUSANDS/ÂΜL (ref 0–0.1)
BASOPHILS NFR BLD AUTO: 0 % (ref 0–1)
BILIRUB SERPL-MCNC: 0.38 MG/DL (ref 0.2–1)
BUN SERPL-MCNC: 14 MG/DL (ref 5–25)
CALCIUM SERPL-MCNC: 9.4 MG/DL (ref 8.4–10.2)
CARDIAC TROPONIN I PNL SERPL HS: 2 NG/L
CARDIAC TROPONIN I PNL SERPL HS: 4 NG/L
CHLORIDE SERPL-SCNC: 108 MMOL/L (ref 96–108)
CO2 SERPL-SCNC: 21 MMOL/L (ref 21–32)
CREAT SERPL-MCNC: 1.11 MG/DL (ref 0.6–1.3)
D DIMER PPP FEU-MCNC: 0.55 UG/ML FEU
EOSINOPHIL # BLD AUTO: 0.02 THOUSAND/ÂΜL (ref 0–0.61)
EOSINOPHIL NFR BLD AUTO: 0 % (ref 0–6)
ERYTHROCYTE [DISTWIDTH] IN BLOOD BY AUTOMATED COUNT: 13.6 % (ref 11.6–15.1)
GFR SERPL CREATININE-BSD FRML MDRD: 58 ML/MIN/1.73SQ M
GLUCOSE SERPL-MCNC: 85 MG/DL (ref 65–140)
HCG SERPL QL: ABNORMAL
HCT VFR BLD AUTO: 42.6 % (ref 34.8–46.1)
HGB BLD-MCNC: 13.4 G/DL (ref 11.5–15.4)
IMM GRANULOCYTES # BLD AUTO: 0.02 THOUSAND/UL (ref 0–0.2)
IMM GRANULOCYTES NFR BLD AUTO: 0 % (ref 0–2)
INR PPP: 1.05 (ref 0.84–1.19)
LACTATE SERPL-SCNC: 1.6 MMOL/L (ref 0.5–2)
LACTATE SERPL-SCNC: 3 MMOL/L (ref 0.5–2)
LIPASE SERPL-CCNC: 7 U/L (ref 11–82)
LYMPHOCYTES # BLD AUTO: 2.38 THOUSANDS/ÂΜL (ref 0.6–4.47)
LYMPHOCYTES NFR BLD AUTO: 34 % (ref 14–44)
MCH RBC QN AUTO: 26.7 PG (ref 26.8–34.3)
MCHC RBC AUTO-ENTMCNC: 31.5 G/DL (ref 31.4–37.4)
MCV RBC AUTO: 85 FL (ref 82–98)
MONOCYTES # BLD AUTO: 0.52 THOUSAND/ÂΜL (ref 0.17–1.22)
MONOCYTES NFR BLD AUTO: 7 % (ref 4–12)
NEUTROPHILS # BLD AUTO: 4.13 THOUSANDS/ÂΜL (ref 1.85–7.62)
NEUTS SEG NFR BLD AUTO: 59 % (ref 43–75)
NRBC BLD AUTO-RTO: 0 /100 WBCS
PLATELET # BLD AUTO: 324 THOUSANDS/UL (ref 149–390)
PMV BLD AUTO: 11 FL (ref 8.9–12.7)
POTASSIUM SERPL-SCNC: 3.8 MMOL/L (ref 3.5–5.3)
PROT SERPL-MCNC: 7.4 G/DL (ref 6.4–8.4)
PROTHROMBIN TIME: 13.9 SECONDS (ref 11.6–14.5)
RBC # BLD AUTO: 5.01 MILLION/UL (ref 3.81–5.12)
SODIUM SERPL-SCNC: 141 MMOL/L (ref 135–147)
WBC # BLD AUTO: 7.1 THOUSAND/UL (ref 4.31–10.16)

## 2022-12-06 RX ORDER — FENTANYL CITRATE 50 UG/ML
75 INJECTION, SOLUTION INTRAMUSCULAR; INTRAVENOUS ONCE
Status: COMPLETED | OUTPATIENT
Start: 2022-12-06 | End: 2022-12-06

## 2022-12-06 RX ORDER — ONDANSETRON 2 MG/ML
4 INJECTION INTRAMUSCULAR; INTRAVENOUS ONCE
Status: COMPLETED | OUTPATIENT
Start: 2022-12-06 | End: 2022-12-06

## 2022-12-06 RX ORDER — LIDOCAINE HYDROCHLORIDE 20 MG/ML
JELLY TOPICAL ONCE
Status: COMPLETED | OUTPATIENT
Start: 2022-12-06 | End: 2022-12-07

## 2022-12-06 RX ORDER — FENTANYL CITRATE 50 UG/ML
75 INJECTION, SOLUTION INTRAMUSCULAR; INTRAVENOUS ONCE
Status: COMPLETED | OUTPATIENT
Start: 2022-12-07 | End: 2022-12-07

## 2022-12-06 RX ORDER — PHENAZOPYRIDINE HYDROCHLORIDE 100 MG/1
200 TABLET, FILM COATED ORAL ONCE
Status: COMPLETED | OUTPATIENT
Start: 2022-12-06 | End: 2022-12-06

## 2022-12-06 RX ADMIN — PHENAZOPYRIDINE 200 MG: 100 TABLET ORAL at 22:32

## 2022-12-06 RX ADMIN — SODIUM CHLORIDE 1000 ML: 0.9 INJECTION, SOLUTION INTRAVENOUS at 22:11

## 2022-12-06 RX ADMIN — FENTANYL CITRATE 75 MCG: 50 INJECTION INTRAMUSCULAR; INTRAVENOUS at 22:32

## 2022-12-06 RX ADMIN — ONDANSETRON 4 MG: 2 INJECTION INTRAMUSCULAR; INTRAVENOUS at 22:13

## 2022-12-07 VITALS
HEART RATE: 101 BPM | DIASTOLIC BLOOD PRESSURE: 55 MMHG | OXYGEN SATURATION: 96 % | TEMPERATURE: 98 F | RESPIRATION RATE: 18 BRPM | SYSTOLIC BLOOD PRESSURE: 129 MMHG

## 2022-12-07 LAB
ATRIAL RATE: 103 BPM
B-HCG SERPL-ACNC: 6 MIU/ML (ref 0–11.6)
BACTERIA UR QL AUTO: ABNORMAL /HPF
BILIRUB UR QL STRIP: ABNORMAL
CLARITY UR: CLEAR
COLOR UR: ABNORMAL
GLUCOSE UR STRIP-MCNC: NEGATIVE MG/DL
HGB UR QL STRIP.AUTO: NEGATIVE
KETONES UR STRIP-MCNC: ABNORMAL MG/DL
LEUKOCYTE ESTERASE UR QL STRIP: ABNORMAL
MUCOUS THREADS UR QL AUTO: ABNORMAL
NITRITE UR QL STRIP: POSITIVE
NON-SQ EPI CELLS URNS QL MICRO: ABNORMAL /HPF
P AXIS: 24 DEGREES
PH UR STRIP.AUTO: 8.5 [PH]
PR INTERVAL: 156 MS
PROT UR STRIP-MCNC: ABNORMAL MG/DL
QRS AXIS: 62 DEGREES
QRSD INTERVAL: 82 MS
QT INTERVAL: 350 MS
QTC INTERVAL: 458 MS
RBC #/AREA URNS AUTO: ABNORMAL /HPF
SP GR UR STRIP.AUTO: 1.03 (ref 1–1.03)
T WAVE AXIS: 23 DEGREES
UROBILINOGEN UR STRIP-ACNC: 6 MG/DL
VENTRICULAR RATE: 103 BPM
WBC #/AREA URNS AUTO: ABNORMAL /HPF

## 2022-12-07 RX ORDER — CEPHALEXIN 250 MG/1
500 CAPSULE ORAL 4 TIMES DAILY
Qty: 56 CAPSULE | Refills: 0 | Status: SHIPPED | OUTPATIENT
Start: 2022-12-07 | End: 2022-12-14

## 2022-12-07 RX ADMIN — CEFTRIAXONE 1000 MG: 1 INJECTION, POWDER, FOR SOLUTION INTRAMUSCULAR; INTRAVENOUS at 01:26

## 2022-12-07 RX ADMIN — SODIUM CHLORIDE 1000 ML: 0.9 INJECTION, SOLUTION INTRAVENOUS at 00:25

## 2022-12-07 RX ADMIN — FENTANYL CITRATE 75 MCG: 50 INJECTION INTRAMUSCULAR; INTRAVENOUS at 00:33

## 2022-12-07 RX ADMIN — LIDOCAINE HYDROCHLORIDE: 20 JELLY TOPICAL at 00:18

## 2022-12-07 NOTE — ED CARE HANDOFF
Emergency Department Sign Out Note        Sign out and transfer of care from Dr Gabe Aquino  See Separate Emergency Department note  The patient, Alfred Will, was evaluated by the previous provider for abdominal pain  Workup Completed:  Yes except UA pending    ED Course / Workup Pending (followup): I reviewed UA and d/w patient and IV rocephin ordered  Abdomen nontender prior to discharge                   PERC Rule for PE    Flowsheet Row Most Recent Value   PERC Rule for PE    Age >=50 1 Filed at: 12/06/2022 2245   HR >=100 0 Filed at: 12/06/2022 2245   O2 Sat on room air < 95% 0 Filed at: 12/06/2022 2245   History of PE or DVT 0 Filed at: 12/06/2022 2245   Recent trauma or surgery 0 Filed at: 12/06/2022 2245   Hemoptysis 0 Filed at: 12/06/2022 2245   Exogenous estrogen 0 Filed at: 12/06/2022 2245   Unilateral leg swelling 0 Filed at: 12/06/2022 2245   PERC Rule for PE Results 1 Filed at: 12/06/2022 2245              Wells' Criteria for PE    Flowsheet Row Most Recent Value   Wells' Criteria for PE    Clinical signs and symptoms of DVT 0 Filed at: 12/06/2022 2245   PE is primary diagnosis or equally likely 0 Filed at: 12/06/2022 2245   HR >100 0 Filed at: 12/06/2022 2245   Immobilization at least 3 days or Surgery in the previous 4 weeks 0 Filed at: 12/06/2022 2245   Previous, objectively diagnosed PE or DVT 0 Filed at: 12/06/2022 2245   Hemoptysis 0 Filed at: 12/06/2022 2245   Malignancy with treatment within 6 months or palliative 0 Filed at: 12/06/2022 2245   Wells' Criteria Total 0 Filed at: 12/06/2022 2245                   Procedures  MDM        Disposition  Final diagnoses:   Abdominal pain   UTI (urinary tract infection)     Time reflects when diagnosis was documented in both MDM as applicable and the Disposition within this note     Time User Action Codes Description Comment    12/7/2022  2:25 AM Yaritza Ku Add [R10 9] Abdominal pain     12/7/2022  2:25 AM Yaritza Ku Add [N39 0] UTI (urinary tract infection)       ED Disposition     ED Disposition   Discharge    Condition   Stable    Date/Time   Wed Dec 7, 2022  2:25 AM    Comment   Meliton Quezada discharge to home/self care  Follow-up Information     Follow up With Specialties Details Why Contact Angus Mckeon Courser, DO Family Medicine Call in 1 day Return sooner if increased pain, fever, vomiting, diarrhea, rash, difficulty breathing or urinating  [place] [address]  [address]  [place] [address]  [phone]          Patient's Medications   Discharge Prescriptions    CEPHALEXIN (KEFLEX) 250 MG CAPSULE    Take 2 capsules (500 mg total) by mouth 4 (four) times a day for 7 days       Start Date: 12/7/2022 End Date: 12/14/2022       Order Dose: 500 mg       Quantity: 56 capsule    Refills: 0     No discharge procedures on file         ED Provider  Electronically Signed by     Barry Sharif MD  12/07/22 6777

## 2022-12-07 NOTE — ED PROVIDER NOTES
History  Chief Complaint   Patient presents with   • Vaginal Pain   • Abdominal Pain     Pt reports vaginal and abdominal pain over the past hour  Reports SOB  Pt was hyperventilating and needed to helped out from car due to pain and SOB  Pt unable to fully communicate due to hyperventilating and pain  • Shortness of Breath     Patient is a 62-year-old female who presents to the emergency department for evaluation with abdominal discomfort and shortness of breath  She relates that the abdominal pain started approximately 1 hour ago  She notes that this is in the lower abdomen and when asked to describe the pain explains that "it feels like I need to go to the bathroom "  She has urge to urinate and today has had repetitive urgency as well as passage of very small amounts of urine  She does not feel as though she is fully emptying the bladder  She appreciates dysuria although no gross hematuria or malodor  She notices some discomfort upon wiping  Uncertain when last bowel movement was-suggesting it may have been several days ago  Patient's  notes that she does at times go several days without bowel movements  She denies having any nausea or vomiting  No fevers, chest discomfort or cough  Shortness of breath started along with pain  No recalled similar discomfort  Seen in ED in October for heavy vaginal bleeding  This has since resolved  She had remote ureterolithiasis-passed spontaneously  Abdominal surgical history significant for  section, cholecystectomy and gastric bypass  Patient and  report that she is unable to have morphine  She developed significant erythema and pruritus upon receiving this following    notes that she typically receives Toradol  Prior to Admission Medications   Prescriptions Last Dose Informant Patient Reported? Taking?    ALPRAZolam (XANAX) 1 mg tablet   Yes No   Sig: TAKE 1 TO 2 TABLETS BY MOUTH DAILY AS NEEDED NEEDLE, DISP, 30 G (BD Eclipse Needle) 30G X 1/2" MISC   No No   Sig: Use daily   Patient not taking: Reported on 2022   Sod Picosulfate-Mag Ox-Cit Acd (Clenpiq) 10-3 5-12 MG-GM -GM/160ML SOLN   No No   Sig: Take 1 Package by mouth see administration instructions   Sodium Oxybate (Xyrem) 500 MG/ML SOLN   No No   Sig: First dose (bedtime): 4 5 g + Second dose (2 5-4 hrs later): 4 5 g = 9 g Total Nightly Dose [Total Qty: 1 month(s)]   albuterol (Ventolin HFA) 90 mcg/act inhaler   No No   Sig: Inhale 2 puffs every 4 (four) hours as needed for wheezing   benzonatate (TESSALON PERLES) 100 mg capsule   No No   Sig: Take 1 capsule (100 mg total) by mouth 3 (three) times a day as needed for cough   benztropine (COGENTIN) 1 mg tablet   Yes No   Sig: Take 1 mg by mouth 2 (two) times a day   clonazePAM (KlonoPIN) 0 5 mg tablet   Yes No   Sig: Take 0 5 mg by mouth 2 (two) times a day as needed   clonazePAM (KlonoPIN) 2 mg tablet   Yes No   Sig: TAKE 1/2 TABLET BY MOUTH FOUR TIMES DAILY   MAY USE WITH ALPRAZOLAM   cyanocobalamin 1,000 mcg/mL   No No   Si ml SQ Every 2 weeks with syringe and needles   econazole nitrate 1 % cream   No No   Sig: Apply topically daily   ergocalciferol (VITAMIN D2) 50,000 units   No No   Sig: Take 1 capsule (50,000 Units total) by mouth every 14 (fourteen) days   famotidine (PEPCID) 20 mg tablet   No No   Sig: TAKE 1 TABLET(20 MG) BY MOUTH TWICE DAILY   ferrous sulfate 325 (65 Fe) mg tablet   No No   Sig: Take 1 tablet (325 mg total) by mouth daily with breakfast   loxapine (LOXITANE) 10 mg capsule   No No   Si in am, 1 in afternoon, 3 before bed - per Dr Cobb Minor   Patient not taking: Reported on 2022   loxapine (LOXITANE) 5 mg capsule   Yes No   Sig: Take 5 mg by mouth in the morning   Patient not taking: Reported on 2022   medroxyPROGESTERone (PROVERA) 10 mg tablet   No No   Sig: Take 1 tablet (10 mg total) by mouth daily as needed (for heavy or prolonged bleeding) metoprolol tartrate (LOPRESSOR) 50 mg tablet   No No   Sig: Take 1 tablet (50 mg total) by mouth 2 (two) times a day   venlafaxine (EFFEXOR) 100 MG tablet   No No   Sig: Take 1 tablet (100 mg total) by mouth 3 (three) times a day      Facility-Administered Medications: None       Past Medical History:   Diagnosis Date   • Anxiety    • Asthma     childhood   • Depression    • Narcolepsy    • Obesity        Past Surgical History:   Procedure Laterality Date   •  SECTION     • CHOLECYSTECTOMY     • KNEE ARTHROSCOPY Left    • MELISSA-EN-Y PROCEDURE         Family History   Problem Relation Age of Onset   • Hypertension Mother    • Depression Mother    • Atrial fibrillation Mother    • Hypertension Father    • Depression Father    • Heart failure Father         heart attack in 35s   • No Known Problems Sister    • Diabetes Maternal Grandmother    • Stroke Maternal Grandmother    • Brain cancer Maternal Grandfather    • Lung cancer Paternal Grandfather    • No Known Problems Son    • Cystic fibrosis Son    • No Known Problems Maternal Aunt    • No Known Problems Maternal Aunt    • No Known Problems Maternal Aunt    • No Known Problems Maternal Aunt    • No Known Problems Paternal Aunt    • Alcohol abuse Neg Hx    • Substance Abuse Neg Hx    • Mental illness Neg Hx      I have reviewed and agree with the history as documented  E-Cigarette/Vaping   • E-Cigarette Use Never User      E-Cigarette/Vaping Substances   • Nicotine No    • THC No    • CBD No    • Flavoring No      Social History     Tobacco Use   • Smoking status: Never   • Smokeless tobacco: Never   Vaping Use   • Vaping Use: Never used   Substance Use Topics   • Alcohol use: Not Currently   • Drug use: No       Review of Systems   Constitutional: Negative for fever  Respiratory: Negative for cough  Cardiovascular: Negative for chest pain  Gastrointestinal: Negative for blood in stool and rectal pain     Genitourinary: Positive for difficulty urinating and dysuria  Negative for flank pain  All other systems reviewed and are negative  Physical Exam  Physical Exam  Vitals and nursing note reviewed  Constitutional:       General: She is in acute distress  Appearance: She is well-developed  She is obese  Comments: Patient very anxious and uncomfortable appearing laying supine breathing through mouth and holding feet pointed down  HENT:      Head: Normocephalic  Mouth/Throat:      Comments: Oral mucosa slightly dry  Eyes:      General: No scleral icterus  Extraocular Movements: Extraocular movements intact  Cardiovascular:      Rate and Rhythm: Normal rate and regular rhythm  Heart sounds: Normal heart sounds  Pulmonary:      Effort: Pulmonary effort is normal       Breath sounds: Normal breath sounds  Abdominal:      General: Bowel sounds are normal       Palpations: Abdomen is soft  Tenderness: There is abdominal tenderness in the right lower quadrant and suprapubic area  There is left CVA tenderness  There is no right CVA tenderness or guarding  Genitourinary:     Comments: Mild erythema of external genitalia  No obvious organ prolapse or candidal infection  Skin:     General: Skin is warm and dry  Neurological:      General: No focal deficit present  Mental Status: She is alert     Psychiatric:         Mood and Affect: Mood normal          Behavior: Behavior normal          Vital Signs  ED Triage Vitals   Temperature Pulse Respirations Blood Pressure SpO2   12/06/22 2356 12/06/22 2050 12/06/22 2050 12/06/22 2356 12/06/22 2050   98 °F (36 7 °C) 70 18 107/70 92 %      Temp Source Heart Rate Source Patient Position - Orthostatic VS BP Location FiO2 (%)   12/06/22 2356 12/06/22 2050 -- -- --   Oral Monitor         Pain Score       12/06/22 2232       8           Vitals:    12/06/22 2050 12/06/22 2356   BP:  107/70   Pulse: 70          Visual Acuity      ED Medications  Medications   sodium chloride 0 9 % bolus 1,000 mL (has no administration in time range)   lidocaine (URO-JET) 2 % urethral/mucosal gel (has no administration in time range)   fentanyl citrate (PF) 100 MCG/2ML 75 mcg (has no administration in time range)   sodium chloride 0 9 % bolus 1,000 mL (1,000 mL Intravenous New Bag 12/6/22 2211)   ondansetron (ZOFRAN) injection 4 mg (4 mg Intravenous Given 12/6/22 2213)   fentanyl citrate (PF) 100 MCG/2ML 75 mcg (75 mcg Intravenous Given 12/6/22 2232)   phenazopyridine (PYRIDIUM) tablet 200 mg (200 mg Oral Given 12/6/22 2232)       Diagnostic Studies  Results Reviewed     Procedure Component Value Units Date/Time    HS Troponin I 2hr [[id number]]  (Normal) Collected: 12/06/22 2319    Lab Status: Final result Specimen: Blood from Arm, Left Updated: 12/06/22 2350     hs TnI 2hr 4 ng/L      Delta 2hr hsTnI 2 ng/L     Lactic acid 2 Hours [[id number]]  (Normal) Collected: 12/06/22 2319    Lab Status: Final result Specimen: Blood from Arm, Left Updated: 12/06/22 2342     LACTIC ACID 1 6 mmol/L     Narrative:      Result may be elevated if tourniquet was used during collection  hCG, quantitative [[id number]] Collected: 12/06/22 2143    Lab Status: In process Specimen: Blood from Arm, Left Updated: 12/06/22 2302    Lactic acid [[id number]]  (Abnormal) Collected: 12/06/22 2143    Lab Status: Final result Specimen: Blood from Arm, Left Updated: 12/06/22 2224     LACTIC ACID 3 0 mmol/L     Narrative:      Result may be elevated if tourniquet was used during collection      HS Troponin 0hr (reflex protocol) [[id number]]  (Normal) Collected: 12/06/22 2143    Lab Status: Final result Specimen: Blood from Arm, Left Updated: 12/06/22 2220     hs TnI 0hr 2 ng/L     hCG, qualitative pregnancy [[id number]]  (Abnormal) Collected: 12/06/22 2143    Lab Status: Final result Specimen: Blood from Arm, Left Updated: 12/06/22 2219     Preg, Serum Borderline    Lipase [[id number]]  (Abnormal) Collected: 12/06/22 2143    Lab Status: Final result Specimen: Blood from Arm, Left Updated: 12/06/22 2214     Lipase 7 u/L     Comprehensive metabolic panel [[id number]]  (Abnormal) Collected: 12/06/22 2143    Lab Status: Final result Specimen: Blood from Arm, Left Updated: 12/06/22 2214     Sodium 141 mmol/L      Potassium 3 8 mmol/L      Chloride 108 mmol/L      CO2 21 mmol/L      ANION GAP 12 mmol/L      BUN 14 mg/dL      Creatinine 1 11 mg/dL      Glucose 85 mg/dL      Calcium 9 4 mg/dL      AST 21 U/L      ALT 14 U/L      Alkaline Phosphatase 199 U/L      Total Protein 7 4 g/dL      Albumin 3 9 g/dL      Total Bilirubin 0 38 mg/dL      eGFR 58 ml/min/1 73sq m     Narrative:      [place] guidelines for Chronic Kidney Disease (CKD):   •  Stage 1 with normal or high GFR (GFR > 90 mL/min/1 73 square meters)  •  Stage 2 Mild CKD (GFR = 60-89 mL/min/1 73 square meters)  •  Stage 3A Moderate CKD (GFR = 45-59 mL/min/1 73 square meters)  •  Stage 3B Moderate CKD (GFR = 30-44 mL/min/1 73 square meters)  •  Stage 4 Severe CKD (GFR = 15-29 mL/min/1 73 square meters)  •  Stage 5 End Stage CKD (GFR <15 mL/min/1 73 square meters)  Note: GFR calculation is accurate only with a steady state creatinine    D-Dimer [[id number]]  (Abnormal) Collected: 12/06/22 2143    Lab Status: Final result Specimen: Blood from Arm, Left Updated: 12/06/22 2214     D-Dimer, Quant 0 55 ug/ml FEU     Narrative: In the evaluation for possible pulmonary embolism, in the appropriate (Well's Score of 4 or less) patient, the age adjusted d-dimer cutoff for this patient can be calculated as:    Age x 0 01 (in ug/mL) for Age-adjusted D-dimer exclusion threshold for a patient over 50 years      Nara Romp [[id number]]  (Normal) Collected: 12/06/22 2143    Lab Status: Final result Specimen: Blood from Arm, Left Updated: 12/06/22 2210     Protime 13 9 seconds      INR 1 05    APTT [[id number]]  (Normal) Collected: 12/06/22 2143    Lab Status: Final result Specimen: Blood from Arm, Left Updated: 12/06/22 2210     PTT 29 seconds     CBC and differential [[id number]]  (Abnormal) Collected: 12/06/22 2143    Lab Status: Final result Specimen: Blood from Arm, Left Updated: 12/06/22 2158     WBC 7 10 Thousand/uL      RBC 5 01 Million/uL      Hemoglobin 13 4 g/dL      Hematocrit 42 6 %      MCV 85 fL      MCH 26 7 pg      MCHC 31 5 g/dL      RDW 13 6 %      MPV 11 0 fL      Platelets 695 Thousands/uL      nRBC 0 /100 WBCs      Neutrophils Relative 59 %      Immat GRANS % 0 %      Lymphocytes Relative 34 %      Monocytes Relative 7 %      Eosinophils Relative 0 %      Basophils Relative 0 %      Neutrophils Absolute 4 13 Thousands/µL      Immature Grans Absolute 0 02 Thousand/uL      Lymphocytes Absolute 2 38 Thousands/µL      Monocytes Absolute 0 52 Thousand/µL      Eosinophils Absolute 0 02 Thousand/µL      Basophils Absolute 0 03 Thousands/µL     UA (URINE) with reflex to Scope [[id number]]     Lab Status: No result Specimen: Urine                  CT renal stone study abdomen pelvis without contrast   Final Result by Jamee Raza MD (12/06 2320)      No stone or hydronephrosis         Workstation performed: [id number]         XR chest 1 view portable   ED Interpretation by Tosin Hein MD (12/06 2146)   Unremarkable cardiac silhouette  Clear lungs    Right hemidiaphragm elevation                 Procedures  ECG 12 Lead Documentation Only    Date/Time: 12/6/2022 10:00 PM  Performed by: Tosin Hein MD  Authorized by: Tosin Hein MD     ECG reviewed by me, the ED Provider: yes    Patient location:  ED  Previous ECG:     Previous ECG:  Compared to current    Comparison ECG info:  June 19, 2010-    Similarity:  No change  Rate:     ECG rate:  103    ECG rate assessment: tachycardic    Rhythm:     Rhythm: sinus rhythm    Ectopy:     Ectopy: none    QRS:     QRS axis:  Normal    QRS intervals:  Normal  Conduction:     Conduction: normal    ST segments:     ST segments:  Normal  T waves:     T waves: normal      Universal Protocol:  Consent: Verbal consent obtained  Consent given by: patient      Bladder catheterization    Date/Time: 12/6/2022 11:46 PM  Performed by: Esther Dooley MD  Authorized by: Esther Dooley MD     Patient location:  ED  Consent:     Consent given by:  Patient  Procedure details:     Catheter insertion:  Indwelling    Approach: natural orifice      Catheter type:  Latex    Catheter size:  18 Fr    Number of attempts:  1    Successful placement: yes      Urine appearance: Translucent orange  Post-procedure details:     Patient tolerance of procedure: Tolerated well, no immediate complications             ED Course  ED Course as of 12/06/22 2359   Tue Dec 06, 2022   2241 Discomfort has improved slightly  She no longer appreciates dyspnea  O2 sat 99% on room air  Meets PEG-eD criteria - making PE extremely unlikely  Continues with urge to void and inability to do so  She has received 200 ml saline thus far  Headed for CT scan at this time  If she is identified to be retaining urine will catheterize  Did review "borderline" pregnancy test   Patient denies any suspicion of pregnancy  She notes that she underwent tubal ligation 20 years ago  Quantitative level is pending  Patient will require follow-up with her OB/GYN  Elevation may be related to heavy bleeding - ? Cyst/mass/ other etiology of hormone elevation   2315 At this time patient is tender only over the suprapubic region  She remains attempting to void (pure wick is in place) and being unable to do so  I do not see obvious ureteral stone on CT scan  Radiology interpretation of CT images pending  She does have moderate amounts of urine in the bladder  Being unable to empty any of this will plan to place Rodriguez catheter  Repeat lactic acid and troponin being drawn at this time  2348 Rodriguez catheter placed with ease  Immediate return of translucent orange urine  No debris appreciated in this  After drainage of 125 mL patient did not report any improvement/change in suprapubic discomfort/urinary urgency  Specimen will be sent for testing  CT scan has since returned unremarkable  If UA not consistent with UTI may need CT scan with contrast (oral and IV) to better elucidate bowel  [address] will be transferred to Dr Cornelia Mendoza for PE    [address] Most Recent Value   [facility] Rule for PE    Age >=50 1 Filed at: 12/06/2022 2245   HR >=100 0 Filed at: 12/06/2022 2245   O2 Sat on room air < 95% 0 Filed at: 12/06/2022 2245   History of PE or DVT 0 Filed at: 12/06/2022 2245   Recent trauma or surgery 0 Filed at: 12/06/2022 2245   Hemoptysis 0 Filed at: 12/06/2022 2245   Exogenous estrogen 0 Filed at: 12/06/2022 2245   Unilateral leg swelling 0 Filed at: 12/06/2022 2245   PERC Rule for PE Results 1 Filed at: 12/06/2022 2245                  Kasandra Bailon' Criteria for PE    Flowsheet Row Most Recent Value   Wells' Criteria for PE    Clinical signs and symptoms of DVT 0 Filed at: 12/06/2022 2245   PE is primary diagnosis or equally likely 0 Filed at: 12/06/2022 2245   HR >100 0 Filed at: 12/06/2022 2245   Immobilization at least 3 days or Surgery in the previous 4 weeks 0 Filed at: 12/06/2022 2245   Previous, objectively diagnosed PE or DVT 0 Filed at: 12/06/2022 2245   Hemoptysis 0 Filed at: 12/06/2022 2245   Malignancy with treatment within 6 months or palliative 0 Filed at: 12/06/2022 2245   Wells' Criteria Total 0 Filed at: 12/06/2022 2245                MDM    Disposition  Final diagnoses:   None     ED Disposition     None      Follow-up Information    None         Patient's Medications   Discharge Prescriptions    No medications on file       No discharge procedures on file      PDMP Review     None          ED Provider  Electronically Signed by           Moisés Prado MD  12/06/22 [address] [place] [address]

## 2022-12-08 LAB — BACTERIA UR CULT: NORMAL

## 2022-12-11 LAB
BACTERIA BLD CULT: NORMAL
BACTERIA BLD CULT: NORMAL

## 2022-12-12 LAB
BACTERIA BLD CULT: NORMAL
BACTERIA BLD CULT: NORMAL

## 2023-03-21 DIAGNOSIS — G47.419 PRIMARY NARCOLEPSY WITHOUT CATAPLEXY: ICD-10-CM

## 2023-03-21 DIAGNOSIS — G47.411 NARCOLEPSY AND CATAPLEXY: ICD-10-CM

## 2023-03-21 NOTE — TELEPHONE ENCOUNTER
Refill request for Xyrem fro [facility] pharmacy  Patient last seen by Dr Radha Turk 3/1/2022 and canceled two follow up   She is now rescheduled into August  Left her a message to call back she needs to be rescheduled for follow up

## 2023-03-23 ENCOUNTER — OFFICE VISIT (OUTPATIENT)
Dept: SLEEP CENTER | Facility: CLINIC | Age: 53
End: 2023-03-23

## 2023-03-23 ENCOUNTER — TELEPHONE (OUTPATIENT)
Dept: SLEEP CENTER | Facility: CLINIC | Age: 53
End: 2023-03-23

## 2023-03-23 VITALS
WEIGHT: 265 LBS | HEIGHT: 64 IN | BODY MASS INDEX: 45.24 KG/M2 | SYSTOLIC BLOOD PRESSURE: 118 MMHG | DIASTOLIC BLOOD PRESSURE: 70 MMHG

## 2023-03-23 DIAGNOSIS — G47.411 NARCOLEPSY AND CATAPLEXY: Primary | ICD-10-CM

## 2023-03-23 RX ORDER — SODIUM OXYBATE 0.5 G/ML
SOLUTION ORAL
Qty: 540 ML | Refills: 0 | Status: SHIPPED | OUTPATIENT
Start: 2023-03-23

## 2023-03-23 RX ORDER — QUETIAPINE FUMARATE 25 MG/1
TABLET, FILM COATED ORAL
COMMUNITY
Start: 2023-02-11

## 2023-03-23 NOTE — PROGRESS NOTES
Progress Note - Sleep Center   Travis Bey MRN: [id number]      Reason for Visit:    50 y  o female with narcolepsy  Doing well on Xyrem, but still has excessive daytime sleepiness  Assessment:  The patient has not been able to tolerate any sort of stimulant  Perhaps she would do well on Wakix  Plan:  Start Wakix and titrating dose  Follow up:  3 months    History of Present Illness: The patient has a history of narcolepsy with cataplexy  She is well controlled with Xyrem 4 5 g twice nightly and has had rare cataplexy's while using effects ordered  She still has excessive daytime sleepiness           Historical Information    Past Medical History:   Diagnosis Date   • Anxiety    • Asthma     childhood   • Depression    • Narcolepsy    • Obesity          Past Surgical History:   Procedure Laterality Date   •  SECTION     • CHOLECYSTECTOMY     • KNEE ARTHROSCOPY Left    • MELISSA-EN-Y PROCEDURE           Social History     Socioeconomic History   • Marital status: /Civil Union     Spouse name: Not on file   • Number of children: 3   • Years of education: Not on file   • Highest education level: Not on file   Occupational History   • Occupation: DISABILITY    Tobacco Use   • Smoking status: Never   • Smokeless tobacco: Never   Vaping Use   • Vaping Use: Never used   Substance and Sexual Activity   • Alcohol use: Not Currently   • Drug use: No   • Sexual activity: Yes     Partners: Male     Birth control/protection: Female Sterilization   Other Topics Concern   • Not on file   Social History Narrative    Drinks ice tea 3-4 per day     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    3 children 19,16 and 15 (adopted)         Social Determinants of Health     Financial Resource Strain: Not on file   Food Insecurity: Not on file   Transportation Needs: Not on file   Physical Activity: Not on file   Stress: Not on file   Social Connections: Not on file   Intimate Partner Violence: Not on file   Housing Stability: Not on file           History   Alcohol use: Not on file       History   Smoking Status   • Not on file   Smokeless Tobacco   • Not on file       Family History:   Family History   Problem Relation Age of Onset   • Hypertension Mother    • Depression Mother    • Atrial fibrillation Mother    • Hypertension Father    • Depression Father    • Heart failure Father         heart attack in 35s   • No Known Problems Sister    • Diabetes Maternal Grandmother    • Stroke Maternal Grandmother    • Brain cancer Maternal Grandfather    • Lung cancer Paternal Grandfather    • No Known Problems Son    • Cystic fibrosis Son    • No Known Problems Maternal Aunt    • No Known Problems Maternal Aunt    • No Known Problems Maternal Aunt    • No Known Problems Maternal Aunt    • No Known Problems Paternal Aunt    • Alcohol abuse Neg Hx    • Substance Abuse Neg Hx    • Mental illness Neg Hx        Medications/Allergies:      Current Outpatient Medications:   •  clonazePAM (KlonoPIN) 2 mg tablet, TAKE 1/2 TABLET BY MOUTH FOUR TIMES DAILY   MAY USE WITH ALPRAZOLAM, Disp: , Rfl:   •  cyanocobalamin 1,000 mcg/mL, 1 ml SQ Every 2 weeks with syringe and needles, Disp: 30 mL, Rfl: 11  •  econazole nitrate 1 % cream, Apply topically daily, Disp: 85 g, Rfl: 2  •  QUEtiapine (SEROquel) 25 mg tablet, TAKE 1 TABLET BY MOUTH AT SUPPERTIME AND 2 TABLETS EVERY NIGHT AT BEDTIME, Disp: , Rfl:   •  Sodium Oxybate (Xyrem) 500 MG/ML SOLN, First dose (bedtime): 4 5 g + Second dose (2 5-4 hrs later): 4 5 g = 9 g Total Nightly Dose [Total Qty: 1 month(s)], Disp: 540 mL, Rfl: 5  •  venlafaxine (EFFEXOR) 100 MG tablet, Take 1 tablet (100 mg total) by mouth 3 (three) times a day, Disp: 90 tablet, Rfl: 0  •  albuterol (Ventolin HFA) 90 mcg/act inhaler, Inhale 2 puffs every 4 (four) hours as needed for wheezing, Disp: 18 g, Rfl: 0  •  ALPRAZolam (XANAX) 1 mg tablet, TAKE 1 TO 2 TABLETS BY MOUTH DAILY AS NEEDED (Patient not taking: Reported on 3/23/2023), Disp: , Rfl:   •  benzonatate (TESSALON PERLES) 100 mg capsule, Take 1 capsule (100 mg total) by mouth 3 (three) times a day as needed for cough (Patient not taking: Reported on 3/23/2023), Disp: 20 capsule, Rfl: 0  •  benztropine (COGENTIN) 1 mg tablet, Take 1 mg by mouth 2 (two) times a day (Patient not taking: Reported on 3/23/2023), Disp: , Rfl:   •  clonazePAM (KlonoPIN) 0 5 mg tablet, Take 0 5 mg by mouth 2 (two) times a day as needed (Patient not taking: Reported on 12/7/2022), Disp: , Rfl:   •  ergocalciferol (VITAMIN D2) 50,000 units, Take 1 capsule (50,000 Units total) by mouth every 14 (fourteen) days (Patient not taking: Reported on 3/23/2023), Disp: 30 capsule, Rfl: 5  •  famotidine (PEPCID) 20 mg tablet, TAKE 1 TABLET(20 MG) BY MOUTH TWICE DAILY (Patient not taking: Reported on 3/23/2023), Disp: 180 tablet, Rfl: 2  •  ferrous sulfate 325 (65 Fe) mg tablet, Take 1 tablet (325 mg total) by mouth daily with breakfast (Patient not taking: Reported on 3/23/2023), Disp: 90 tablet, Rfl: 1  •  medroxyPROGESTERone (PROVERA) 10 mg tablet, Take 1 tablet (10 mg total) by mouth daily as needed (for heavy or prolonged bleeding) (Patient not taking: Reported on 3/23/2023), Disp: 10 tablet, Rfl: 0  •  metoprolol tartrate (LOPRESSOR) 50 mg tablet, Take 1 tablet (50 mg total) by mouth 2 (two) times a day, Disp: 180 tablet, Rfl: 0  •  NEEDLE, DISP, 30 G (BD Eclipse Needle) 30G X 1/2" MISC, Use daily (Patient not taking: Reported on 3/23/2023), Disp: 30 each, Rfl: 1  •  Sod Picosulfate-Mag Ox-Cit Acd (Clenpiq) 10-3 5-12 MG-GM -GM/160ML SOLN, Take 1 Package by mouth see administration instructions (Patient not taking: Reported on 3/23/2023), Disp: 320 mL, Rfl: 0      Objective    Vital Signs:   Vitals:    03/23/23 0946   BP: 118/70   Weight: 120 kg (265 lb)   Height: 5' 4" (1 626 m)     [place] Sleepiness Scale:  Total score: 18    Physical Exam:    General: Alert, appropriate, cooperative, overweight    Head: NC/AT    Skin: Warm, dry    Neuro: No motor abnormalities, cranial nerves appear intact    Psych: Normal affect            PARUL Torres    Board Certified Sleep Specialist

## 2023-06-26 ENCOUNTER — TELEPHONE (OUTPATIENT)
Dept: FAMILY MEDICINE CLINIC | Facility: CLINIC | Age: 53
End: 2023-06-26

## 2023-06-26 ENCOUNTER — OFFICE VISIT (OUTPATIENT)
Dept: FAMILY MEDICINE CLINIC | Facility: CLINIC | Age: 53
End: 2023-06-26
Payer: COMMERCIAL

## 2023-06-26 VITALS
OXYGEN SATURATION: 97 % | HEIGHT: 64 IN | WEIGHT: 282 LBS | RESPIRATION RATE: 24 BRPM | BODY MASS INDEX: 48.14 KG/M2 | SYSTOLIC BLOOD PRESSURE: 122 MMHG | HEART RATE: 128 BPM | TEMPERATURE: 97.5 F | DIASTOLIC BLOOD PRESSURE: 80 MMHG

## 2023-06-26 DIAGNOSIS — J45.21 MILD INTERMITTENT ASTHMA WITH EXACERBATION: Primary | ICD-10-CM

## 2023-06-26 DIAGNOSIS — J01.00 ACUTE NON-RECURRENT MAXILLARY SINUSITIS: ICD-10-CM

## 2023-06-26 DIAGNOSIS — F33.3 SEVERE EPISODE OF RECURRENT MAJOR DEPRESSIVE DISORDER, WITH PSYCHOTIC FEATURES (HCC): ICD-10-CM

## 2023-06-26 PROCEDURE — 99214 OFFICE O/P EST MOD 30 MIN: CPT | Performed by: FAMILY MEDICINE

## 2023-06-26 RX ORDER — LEVALBUTEROL 1.25 MG/.5ML
1.25 SOLUTION, CONCENTRATE RESPIRATORY (INHALATION) EVERY 8 HOURS PRN
Status: DISCONTINUED | OUTPATIENT
Start: 2023-06-26 | End: 2023-06-26

## 2023-06-26 RX ORDER — AMOXICILLIN AND CLAVULANATE POTASSIUM 875; 125 MG/1; MG/1
1 TABLET, FILM COATED ORAL EVERY 12 HOURS SCHEDULED
Qty: 14 TABLET | Refills: 0 | Status: SHIPPED | OUTPATIENT
Start: 2023-06-26 | End: 2023-07-03

## 2023-06-26 RX ORDER — LEVALBUTEROL INHALATION SOLUTION 1.25 MG/3ML
1.25 SOLUTION RESPIRATORY (INHALATION) ONCE
Status: COMPLETED | OUTPATIENT
Start: 2023-06-26 | End: 2023-06-26

## 2023-06-26 RX ORDER — LEVALBUTEROL INHALATION SOLUTION 1.25 MG/3ML
1.25 SOLUTION RESPIRATORY (INHALATION) EVERY 6 HOURS PRN
Qty: 75 ML | Refills: 0 | Status: SHIPPED | OUTPATIENT
Start: 2023-06-26

## 2023-06-26 RX ORDER — FLUTICASONE PROPIONATE 220 UG/1
2 AEROSOL, METERED RESPIRATORY (INHALATION) 2 TIMES DAILY
Qty: 12 G | Refills: 0 | Status: SHIPPED | OUTPATIENT
Start: 2023-06-26

## 2023-06-26 RX ADMIN — LEVALBUTEROL INHALATION SOLUTION 1.25 MG: 1.25 SOLUTION RESPIRATORY (INHALATION) at 14:10

## 2023-06-26 NOTE — TELEPHONE ENCOUNTER
Spoke to patient, patient had stated that she was feeling tightness in the chest  Transferred patient back to clinical to be triaged via Jaime Reyna

## 2023-06-26 NOTE — PROGRESS NOTES
Assessment/Plan:   1  Mild intermittent asthma with exacerbation  -     levalbuterol (Xopenex) 1 25 mg/3 mL nebulizer solution; Take 3 mL (1 25 mg total) by nebulization every 6 (six) hours as needed for wheezing or shortness of breath  -     fluticasone (Flovent HFA) 220 mcg/act inhaler; Inhale 2 puffs 2 (two) times a day Rinse mouth after use  -     levalbuterol (XOPENEX) inhalation solution 1 25 mg    2  Acute non-recurrent maxillary sinusitis  Comments:  Apply ice or heat prn, Tylenol prn  Start antibiotics  Orders:  -     amoxicillin-clavulanate (AUGMENTIN) 875-125 mg per tablet; Take 1 tablet by mouth every 12 (twelve) hours for 7 days    3  Severe [facility] of recurrent major depressive disorder, with [facility] )  Comments:  following with psych Dr Garth Gómez     seeing Dr Everardo Arteaga Q 2 weeks    pt with symptoms > 7 days and worsening, whole household sick and needed antibiotics   UDN with xopenex today - after treatment wheezing improved, pt feeling better  Pt states she cannot take prednisone due to her mental health   Will start augmentin, flovent and xopenex   Follow up as needed     There are no Patient Instructions on file for this visit  No follow-ups on file  Subjective:    DANNI Rust is a 48 y o  female who presents with:  Chief Complaint    Shortness of Breath       HPI     Shortness of Breath     Additional comments: Cough, congestion and SOB, Home covid test neg started 4-5 days ago           Last edited by Johann Paz on 6/26/2023  1:48 PM         ---Above per clinical staff & reviewed   ---        Today:  4 - 5 chills, itching ears, stuffy nose   Coughing starting yesterday, cannot lay down without coughing   Everybody has been sick   Feels weak   No COVID in the house   Tight in chest   Used inhaler - not helping   Last used it this morning     The following portions of the patient's history were reviewed and updated as appropriate: allergies, current medications, past "family history, past medical history, past social history, past surgical history and problem list   Review of Systems  ROS:  all others negative - no chest pain, SOB, normal urine and bowels  no GERD  sleeping well  mood good  Objective:    /80   Pulse (!) 128   Temp 97 5 °F (36 4 °C)   Resp (!) 24   Ht 5' 4\" (1 626 m)   Wt 128 kg (282 lb)   SpO2 97%   BMI 48 41 kg/m²   Wt Readings from Last 3 Encounters:   06/26/23 128 kg (282 lb)   03/23/23 120 kg (265 lb)   11/08/22 124 kg (273 lb)     BP Readings from Last 3 Encounters:   06/26/23 122/80   03/23/23 118/70   12/07/22 129/55       Current Medications:  Current Outpatient Medications   Medication Sig Dispense Refill   • albuterol (Ventolin HFA) 90 mcg/act inhaler Inhale 2 puffs every 4 (four) hours as needed for wheezing 18 g 0   • ALPRAZolam (XANAX) 1 mg tablet      • amoxicillin-clavulanate (AUGMENTIN) 875-125 mg per tablet Take 1 tablet by mouth every 12 (twelve) hours for 7 days 14 tablet 0   • benztropine (COGENTIN) 1 mg tablet Take 1 mg by mouth 2 (two) times a day     • clonazePAM (KlonoPIN) 0 5 mg tablet Take 0 5 mg by mouth 2 (two) times a day as needed     • clonazePAM (KlonoPIN) 2 mg tablet TAKE 1/2 TABLET BY MOUTH FOUR TIMES DAILY  MAY USE WITH ALPRAZOLAM     • cyanocobalamin 1,000 mcg/mL 1 ml SQ Every 2 weeks with syringe and needles 30 mL 11   • econazole nitrate 1 % cream Apply topically daily 85 g 2   • ergocalciferol (VITAMIN D2) 50,000 units Take 1 capsule (50,000 Units total) by mouth every 14 (fourteen) days 30 capsule 5   • ferrous sulfate 325 (65 Fe) mg tablet Take 1 tablet (325 mg total) by mouth daily with breakfast 90 tablet 1   • fluticasone (Flovent HFA) 220 mcg/act inhaler Inhale 2 puffs 2 (two) times a day Rinse mouth after use   12 g 0   • levalbuterol (Xopenex) 1 25 mg/3 mL nebulizer solution Take 3 mL (1 25 mg total) by nebulization every 6 (six) hours as needed for wheezing or shortness of breath 75 mL 0   • " "medroxyPROGESTERone (PROVERA) 10 mg tablet Take 1 tablet (10 mg total) by mouth daily as needed (for heavy or prolonged bleeding) 10 tablet 0   • NEEDLE, DISP, 30 G (BD Eclipse Needle) 30G X 1/2\" MISC Use daily 30 each 1   • QUEtiapine (SEROquel) 25 mg tablet TAKE 1 TABLET BY MOUTH AT SUPPERTIME AND 2 TABLETS EVERY NIGHT AT BEDTIME     • Sod Picosulfate-Mag Ox-Cit Acd (Clenpiq) 10-3 5-12 MG-GM -GM/160ML SOLN Take 1 Package by mouth see administration instructions 320 mL 0   • Sodium Oxybate (Xyrem) 500 MG/ML SOLN First dose (bedtime): 4 5 g + Second dose (2 5-4 hrs later): 4 5 g = 9 g Total Nightly Dose [Total Qty: 1 month(s)] 540 mL 4   • venlafaxine (EFFEXOR) 100 MG tablet Take 1 tablet (100 mg total) by mouth 3 (three) times a day 90 tablet 0     No current facility-administered medications for this visit  Physical Exam   Constitutional: she appears well-developed and well-nourished  HENT: Head: Normocephalic  Right Ear: External ear normal  Tympanic membrane normal    Left Ear: External ear normal  Tympanic membrane normal    Nose: Nose normal  No mucosal edema, No rhinorrhea  Right sinus exhibits + frontal and  maxillary sinus tenderness  Left sinus exhibits + frontal and  maxillary sinus tenderness  Mouth/Throat: Oropharynx is clear and moist    Eyes: Normal conjunctiva  No erythema  No discharge  Neck: No pain on exam  Neck supple  Cardiovascular: Normal rate, regular rhythm and normal heart sounds  Pulmonary/Chest: Effort normal and breath sounds+ bilateral wheezing  Abdominal: Soft  Bowel sounds are normal  There is no tenderness  Lymphadenopathy: she has no cervical adenopathy  Neurological: she is alert and oriented to person, place, and time  Skin: Skin is warm and dry  Psychiatric: she has a normal mood and affect  her behavior is normal           BMI Counseling: Body mass index is 48 41 kg/m²   The BMI is above normal  Nutrition recommendations include decreasing " portion sizes  Exercise recommendations include exercising 3-5 times per week  Rationale for BMI follow-up plan is due to patient being overweight or obese  Depression Screening and Follow-up Plan: Patient not screened for depression due to medical reason (urgent/emergent situation, decreased capacity)  Continue regular follow-up with their mental health provider who is managing their mental health condition(s)  Follows with psychiatrist Q 2 weeks

## 2023-06-26 NOTE — TELEPHONE ENCOUNTER
Received transferred call from clerical staff to triage cold like symptoms  Patient complains of post nasal drip, b/l earache, chest tightness, nausea and cough (which is worse when lying down)  Patient states symptoms started yesterday  Patient is COVID negative as of last night  Patient denies fevers or vomiting  Patient has a history of asthma  Patient has been scheduled for an appointment with Dr Cece Benitez on 06/26/2023 @ 01:40PM  Patient accepted appointment

## 2023-07-18 NOTE — PROGRESS NOTES
[facility] made aware that [facility] is ready for review. Patient assigned room #217.  Sharon Todd will be the nurse Assessment/Plan:   1  Well adult exam  See below     2  Elevated LFTs  New elevation of AST and ALT  Worsening elevation of AP  Pt admits to taking 2 tylenol every 2 hours while awake  Advised to stop this  Update labs in 2 weeks  If still high will get more labs, US liver and refer to GI    - Hepatic function panel; Future    3  Elevated alkaline phosphatase level  See above  4  Pain in joint, multiple sites  Reviewed notes  She saw Dr Nicolás Alvarado 3 years ago  BW was normal other than low vitamin D  She did not get x-rays ordered at that time  Appears per note diagnosis was leaning toward fibromyalgia  5  IFG (impaired fasting glucose)  Worsening A1C despite weight loss  Not clear if weight loss was really due to diet changes as she also has worsening cholesterol  6  Hypercholesteremia  See above  7  Vitamin B12 deficiency  Stable  8  Vitamin D deficiency  Stable  Continue prescription vitamin D every 2 weeks  9  Severe episode of recurrent major depressive disorder, with psychotic features ([facility] )  Does not seem well controlled and appears to have tardive dyskinesia from medications  Pt seeing psych every 2 weeks  Offered her referral to psych for a second opinion on her medications as she has not had any changes in years, but she declines  10  Encounter for immunization  Given today   - Pneumococcal Conjugate Vaccine 20-valent (PCV20)    11  Encounter for screening mammogram for breast cancer  - Mammo screening bilateral w 3d & cad; Future    12  Screening for colon cancer  - Cologuard         Well adult exam  ·         Continue healthy diet   ·         Encourage exercise 4 times a week or more for minimum 30 minutes  ·         Continue to see dentist, wear seatbelt  ·         Health maintenance - PCV20 today  Will get shingrix at pharmacy  Will also get flu shot at pharmacy in fall prescription mammo  Agrees to hernan     Reviewed age appropriate health maintenance screenings and immunizations that are due, risks and benefits of these  Health Maintenance   Topic Date Due    Colorectal Cancer Screening  Never done    COVID-19 Vaccine (4 - Booster for Moderna series) 2022    Influenza Vaccine (1) 2022    Breast Cancer Screening: Mammogram  2022    BMI: Followup Plan  2023    BMI: Adult  2023    Annual Physical  2023    Depression Remission PHQ  2023    Cervical Cancer Screening  2027    DTaP,Tdap,and Td Vaccines (2 - Td or Tdap) 2030    HIV Screening  Completed    Hepatitis C Screening  Completed    Pneumococcal Vaccine: Pediatrics (0 to 5 Years) and At-Risk Patients (6 to 59 Years)  Completed    HIB Vaccine  Aged Out    Hepatitis B Vaccine  Aged Out    IPV Vaccine  Aged Out    Hepatitis A Vaccine  Aged Out    Meningococcal ACWY Vaccine  Aged Out    HPV Vaccine  Aged Out     Return in about 1 year (around 2023)      Subjective:    DANNI Weaver is a 50 y o  female who presents today for a physical      Chief Complaint   Patient presents with    Physical Exam    HM     Declined CRC ok for mammo and PCVS20    Medication Refill     NONE          Patient Care Team:  Cathy Macedo DO as PCP - General (Family Medicine)  Jackie Angeles MD (Obstetrics and Gynecology)  Sabra Bernheim, MD (Psychiatry)  Mohit Harvey MD (Sleep Medicine)    PHQ-2/9 Depression Screening    Little interest or pleasure in doing things: 1 - several days  Feeling down, depressed, or hopeless: 1 - several days  Trouble falling or staying asleep, or sleeping too much: 1 - several days  Feeling tired or having little energy: 1 - several days  Poor appetite or overeatin - several days  Feeling bad about yourself - or that you are a failure or have let yourself or your family down: 1 - several days  Trouble concentrating on things, such as reading the newspaper or watching television: 1 - several days  Moving or speaking so slowly that other people could have noticed  Or the opposite - being so fidgety or restless that you have been moving around a lot more than usual: 1 - several days  Thoughts that you would be better off dead, or of hurting yourself in some way: 1 - several days  PHQ-9 Score: 9   PHQ-9 Interpretation: Mild depression         ?    ---Above per clinical staff & reviewed  ---  Patient here today for a physical:    Diet: Lost weight - she says due to change in diet but cannot name the changes she has made   Exercise:  Not exercising   Dental visits:  Seeing dentist   Seatbelt: Wearing seatbelt     Concerns today:  Pt here alone  Seeing therapist weekly for 2 years   Seeing psychiatrist Dr Chucho Wade every 2 weeks but no medicine changes for a long time   She says he is trying to figure out why she is having the movements she is having   Denies feeling suicidal but checked it off on pre-screening PHQ9   When asked what she is taking over the counter that may have caused elevated liver enzymes admits to   Takes tylenol 2 every 4 hours and ibuprofen 800 mg every 6 hours three times a day for at least 6 months   For aches and pains in joints   Right now LUQ from falling out of bed - says this happens sometimes due to her medications   Rates pain 4/10, goes up 8/10     Saw rheum and they wanted x-rays so she did not go back   Says it is different joints at different times  Not sure who the doctor was   Not taking anything else over the counter   No drug use             The following portions of the patient's history were reviewed and updated as appropriate: allergies, current medications, past family history, past medical history, past social history, past surgical history and problem list      Current Medications:  Current Outpatient Medications   Medication Sig Dispense Refill    Albuterol Sulfate (ProAir RespiClick) 664 (90 Base) MCG/ACT AEPB Inhale 1 puff every 4 (four) hours as needed (wheezing) 1 each 1    clonazePAM (KlonoPIN) 0 5 mg tablet Take 0 5 mg by mouth 2 (two) times a day as needed      cyanocobalamin 1,000 mcg/mL 1 ml SQ Every 2 weeks with syringe and needles 30 mL 11    econazole nitrate 1 % cream Apply topically daily 85 g 2    ergocalciferol (VITAMIN D2) 50,000 units Take 1 capsule (50,000 Units total) by mouth every 14 (fourteen) days 30 capsule 5    ferrous sulfate 325 (65 Fe) mg tablet Take 1 tablet (325 mg total) by mouth daily with breakfast 90 tablet 1    lansoprazole (PREVACID) 30 mg capsule Take 1 capsule (30 mg total) by mouth daily 90 capsule 1    loxapine (LOXITANE) 10 mg capsule 1 in am, 1 in afternoon, 3 before bed - per Dr Yian Willingham loxapine (LOXITANE) 5 mg capsule Take 5 mg by mouth in the morning      metoprolol tartrate (LOPRESSOR) 50 mg tablet Take 1 tablet (50 mg total) by mouth in the morning and 1 tablet (50 mg total) before bedtime  180 tablet 0    NEEDLE, DISP, 30 G (BD Eclipse Needle) 30G X 1/2" MISC Use daily 30 each 1    Sodium Oxybate (Xyrem) 500 MG/ML SOLN First dose (bedtime): 4 5 g + Second dose (2 5-4 hrs later): 4 5 g = 9 g Total Nightly Dose [Total Qty: 1 month(s)] 540 mL 5    venlafaxine (EFFEXOR) 100 MG tablet Take 1 tablet (100 mg total) by mouth 3 (three) times a day 90 tablet 0     No current facility-administered medications for this visit  Objective:      /74   Pulse 85   Resp 18   Ht 5' 4" (1 626 m)   Wt 124 kg (273 lb)   SpO2 97%   BMI 46 86 kg/m²   BP Readings from Last 3 Encounters:   07/08/22 128/74   04/14/22 134/89   03/16/22 138/80     Wt Readings from Last 3 Encounters:   07/08/22 124 kg (273 lb)   04/14/22 127 kg (281 lb)   03/16/22 128 kg (283 lb)       Review of Systems  ROS:  all others negative - no chest pain, SOB, normal urine and bowels  no GERD  sleeping well  mood good per pt  Only concern is LUQ pain after fall  Physical Exam  Abdominal:            Constitutional: she appears well-developed and well-nourished   Poor hygeine - clothes not clean  Hair not washed  Dirt under her nails  HENT: Head: Normocephalic  Right Ear: External ear normal  Tympanic membrane normal    Left Ear: External ear normal  Tympanic membrane normal    Nose: Nose normal  No mucosal edema, No rhinorrhea  Right sinus exhibits no maxillary sinus tenderness  Left sinus exhibits no maxillary sinus tenderness  Mouth/Throat: Oropharynx is clear and moist    Eyes: Normal conjunctiva  No erythema  No discharge  Neck: No pain on exam  Neck supple  Cardiovascular: Normal rate, regular rhythm and normal heart sounds  Pulmonary/Chest: Effort normal and breath sounds normal  No wheezes  No rales  No rhonchi  Abdominal: Soft  Bowel sounds are normal  There is no tenderness  Musculoskeletal: she exhibits no edema  Lymphadenopathy: she has no cervical adenopathy  Neurological: she  is alert and oriented to person, place, and time  Skin: Skin is warm and dry  No rashes  Psychiatric: she  has a normal mood and affect  her behavior is normal  Thought content normal  Shaking legs continuously  Staring  Slow to answer  Dull expressions  Vitals reviewed  Some tremor of left arm  BMI Counseling: Body mass index is 46 86 kg/m²  The BMI is above normal  Nutrition recommendations include decreasing portion sizes  Exercise recommendations include exercising 3-5 times per week  Rationale for BMI follow-up plan is due to patient being overweight or obese  Depression Screening and Follow-up Plan: Patient assessed for underlying major depression  Brief counseling provided and recommend additional follow-up/re-evaluation next office visit

## 2023-08-16 NOTE — PROGRESS NOTES
PT Evaluation     Today's date: 3/14/2022  Patient name: Brenna Kelly  : 1970  MRN: [id number]  Referring provider: Rose Heath MD  Dx:   Encounter Diagnosis     ICD-10-CM    1  Tear of left hamstring, initial encounter  S76 312A Ambulatory Referral to Physical Therapy                  Assessment  Assessment details: Pt presents with s/s consistent with a L partial HA tear  Pt will benefit from PT to address impairments and restore function  Impairments: abnormal gait, abnormal or restricted ROM, activity intolerance, impaired physical strength, lacks appropriate home exercise program and pain with function    Goals  ST-4 weeks  1   Pt will decrease pain > 50%  2   Pt will demonstrate normalize L knee ROM  LT-8 weeks  1   Pt will decrease pain > 75%  2   Pt will increase sitting tolerance > 2 hours without pain  3   Pt will increase ambulation tolerance > 1/2 mi  Plan  Planned modality interventions: low level laser therapy  Planned therapy interventions: manual therapy, abdominal trunk stabilization, neuromuscular re-education, body mechanics training, strengthening, stretching, therapeutic activities, therapeutic exercise, therapeutic training, flexibility, functional ROM exercises and gait training  Frequency: 2x week  Duration in weeks: 8        Subjective Evaluation    History of Present Illness  Mechanism of injury: Pt is a 54 yof c/o L HA pain after slipping on water and falling 5 weeks ago      Pain  Current pain ratin  At best pain ratin  At worst pain ratin  Quality: dull ache  Relieving factors: rest  Aggravating factors: walking, standing, sitting and stair climbing  Progression: improved      Diagnostic Tests  X-ray: normal ( pelvis, femur, tibia, knee unremarkable )  Patient Goals  Patient goals for therapy: decreased edema, decreased pain, improved balance, increased motion, increased strength, independence with ADLs/IADLs and return to sport/leisure [de-identified] : 44 years old female patient with history of Persistent tonsil stones and throat pain for the past couple of years.   Patient is present today in the office with bilateral deep cryptic tonsils with tonsil stones > to the right side.  Right side laser assisted tonsil ablation.  activities          Objective     Active Range of Motion   Left Knee   Flexion: 70 degrees with pain  Extension: 0 degrees with pain    Passive Range of Motion   Left Knee   Flexion: 110 degrees with pain  Extension: -7 degrees     Right Knee   Flexion: 125 degrees   Extension: -7 degrees     Strength/Myotome Testing     Left Knee   Flexion: 3+  Extension: 3+    Additional Strength Details  Gait:  Pt demonstrated severely decreased step-length with mod-severe lateral trunk lean  STS: mod UE assist     Tests     Additional Tests Details  (-) SLR: L: 45 deg  Precautions: none  Dx: L partial HENLEY tear 1/30/22      Manuals 3/14            Graston L HNELEY 3 min            Laser L HENLEY 4000J            PSLR                          Neuro Re-Ed             bridges             Standing ecc knee flex 1x10                                                                             Ther Ex             Standing GA stretch 15"x2            Standing HA stretch 15"x2            heelslides             bike                                                                 Ther Activity                                       Gait Training                                       Modalities

## 2023-11-15 ENCOUNTER — HOSPITAL ENCOUNTER (EMERGENCY)
Facility: HOSPITAL | Age: 53
Discharge: HOME | End: 2023-11-15
Attending: EMERGENCY MEDICINE
Payer: COMMERCIAL

## 2023-11-15 ENCOUNTER — APPOINTMENT (EMERGENCY)
Dept: RADIOLOGY | Facility: HOSPITAL | Age: 53
End: 2023-11-15
Payer: COMMERCIAL

## 2023-11-15 ENCOUNTER — OFFICE VISIT (OUTPATIENT)
Dept: FAMILY MEDICINE CLINIC | Facility: CLINIC | Age: 53
End: 2023-11-15
Payer: COMMERCIAL

## 2023-11-15 ENCOUNTER — TELEPHONE (OUTPATIENT)
Age: 53
End: 2023-11-15

## 2023-11-15 VITALS
OXYGEN SATURATION: 98 % | HEART RATE: 122 BPM | TEMPERATURE: 98.9 F | SYSTOLIC BLOOD PRESSURE: 158 MMHG | RESPIRATION RATE: 21 BRPM | DIASTOLIC BLOOD PRESSURE: 84 MMHG

## 2023-11-15 VITALS
SYSTOLIC BLOOD PRESSURE: 124 MMHG | RESPIRATION RATE: 26 BRPM | WEIGHT: 291 LBS | HEIGHT: 64 IN | OXYGEN SATURATION: 98 % | BODY MASS INDEX: 49.68 KG/M2 | DIASTOLIC BLOOD PRESSURE: 80 MMHG | HEART RATE: 128 BPM | TEMPERATURE: 98 F

## 2023-11-15 DIAGNOSIS — R06.82 TACHYPNEA: ICD-10-CM

## 2023-11-15 DIAGNOSIS — R00.0 TACHYCARDIA: ICD-10-CM

## 2023-11-15 DIAGNOSIS — J45.21 MILD INTERMITTENT ASTHMA WITH EXACERBATION: ICD-10-CM

## 2023-11-15 DIAGNOSIS — J10.1 INFLUENZA A: Primary | ICD-10-CM

## 2023-11-15 DIAGNOSIS — J11.1 INFLUENZA: Primary | ICD-10-CM

## 2023-11-15 LAB
ALBUMIN SERPL BCP-MCNC: 3.8 G/DL (ref 3.5–5)
ALP SERPL-CCNC: 164 U/L (ref 34–104)
ALT SERPL W P-5'-P-CCNC: 30 U/L (ref 7–52)
ANION GAP SERPL CALCULATED.3IONS-SCNC: 8 MMOL/L
AST SERPL W P-5'-P-CCNC: 32 U/L (ref 13–39)
BASOPHILS # BLD AUTO: 0.03 THOUSANDS/ÂΜL (ref 0–0.1)
BASOPHILS NFR BLD AUTO: 1 % (ref 0–1)
BILIRUB SERPL-MCNC: 0.23 MG/DL (ref 0.2–1)
BUN SERPL-MCNC: 9 MG/DL (ref 5–25)
CALCIUM SERPL-MCNC: 8.6 MG/DL (ref 8.4–10.2)
CHLORIDE SERPL-SCNC: 104 MMOL/L (ref 96–108)
CO2 SERPL-SCNC: 25 MMOL/L (ref 21–32)
CREAT SERPL-MCNC: 0.73 MG/DL (ref 0.6–1.3)
EOSINOPHIL # BLD AUTO: 0.02 THOUSAND/ÂΜL (ref 0–0.61)
EOSINOPHIL NFR BLD AUTO: 0 % (ref 0–6)
ERYTHROCYTE [DISTWIDTH] IN BLOOD BY AUTOMATED COUNT: 14.9 % (ref 11.6–15.1)
GFR SERPL CREATININE-BSD FRML MDRD: 95 ML/MIN/1.73SQ M
GLUCOSE SERPL-MCNC: 105 MG/DL (ref 65–140)
HCT VFR BLD AUTO: 37.4 % (ref 34.8–46.1)
HGB BLD-MCNC: 11.3 G/DL (ref 11.5–15.4)
IMM GRANULOCYTES # BLD AUTO: 0.01 THOUSAND/UL (ref 0–0.2)
IMM GRANULOCYTES NFR BLD AUTO: 0 % (ref 0–2)
LYMPHOCYTES # BLD AUTO: 0.26 THOUSANDS/ÂΜL (ref 0.6–4.47)
LYMPHOCYTES NFR BLD AUTO: 5 % (ref 14–44)
MCH RBC QN AUTO: 25.9 PG (ref 26.8–34.3)
MCHC RBC AUTO-ENTMCNC: 30.2 G/DL (ref 31.4–37.4)
MCV RBC AUTO: 86 FL (ref 82–98)
MONOCYTES # BLD AUTO: 0.39 THOUSAND/ÂΜL (ref 0.17–1.22)
MONOCYTES NFR BLD AUTO: 7 % (ref 4–12)
NEUTROPHILS # BLD AUTO: 4.68 THOUSANDS/ÂΜL (ref 1.85–7.62)
NEUTS SEG NFR BLD AUTO: 87 % (ref 43–75)
NRBC BLD AUTO-RTO: 0 /100 WBCS
PLATELET # BLD AUTO: 283 THOUSANDS/UL (ref 149–390)
PMV BLD AUTO: 9.9 FL (ref 8.9–12.7)
POTASSIUM SERPL-SCNC: 4.5 MMOL/L (ref 3.5–5.3)
PROT SERPL-MCNC: 7.2 G/DL (ref 6.4–8.4)
RBC # BLD AUTO: 4.37 MILLION/UL (ref 3.81–5.12)
SODIUM SERPL-SCNC: 137 MMOL/L (ref 135–147)
WBC # BLD AUTO: 5.39 THOUSAND/UL (ref 4.31–10.16)

## 2023-11-15 PROCEDURE — 80053 COMPREHEN METABOLIC PANEL: CPT

## 2023-11-15 PROCEDURE — 85025 COMPLETE CBC W/AUTO DIFF WBC: CPT

## 2023-11-15 PROCEDURE — 96360 HYDRATION IV INFUSION INIT: CPT

## 2023-11-15 PROCEDURE — 36415 COLL VENOUS BLD VENIPUNCTURE: CPT

## 2023-11-15 PROCEDURE — 99213 OFFICE O/P EST LOW 20 MIN: CPT | Performed by: FAMILY MEDICINE

## 2023-11-15 PROCEDURE — 99285 EMERGENCY DEPT VISIT HI MDM: CPT

## 2023-11-15 PROCEDURE — 71045 X-RAY EXAM CHEST 1 VIEW: CPT

## 2023-11-15 PROCEDURE — 99284 EMERGENCY DEPT VISIT MOD MDM: CPT

## 2023-11-15 RX ORDER — ALBUTEROL SULFATE 2.5 MG/3ML
2.5 SOLUTION RESPIRATORY (INHALATION) ONCE
Status: COMPLETED | OUTPATIENT
Start: 2023-11-15 | End: 2023-11-15

## 2023-11-15 RX ORDER — OSELTAMIVIR PHOSPHATE 75 MG/1
75 CAPSULE ORAL EVERY 12 HOURS
Qty: 10 CAPSULE | Refills: 0 | Status: SHIPPED | OUTPATIENT
Start: 2023-11-15 | End: 2023-11-20

## 2023-11-15 RX ADMIN — SODIUM CHLORIDE 1000 ML: 0.9 INJECTION, SOLUTION INTRAVENOUS at 15:05

## 2023-11-15 RX ADMIN — ALBUTEROL SULFATE 2.5 MG: 2.5 SOLUTION RESPIRATORY (INHALATION) at 15:23

## 2023-11-15 NOTE — ED PROVIDER NOTES
History  Chief Complaint   Patient presents with    Flu Symptoms     Pt presents stating "my whole family has the flu so I basically have the flu, can I go home now?" States she has a cough and just doesn't feel well. Nikhil Ricardo is a 48year old female with a PMH of asthma and anxiety presenting to the ER after visiting her PCP for influenza. Patient's  and son both tested positive for influenza a. She started having a mild sore throat, dry cough and headaches on 11/13. She went to her PCP today and while there she was tachycardic at 128 and tachypnic at 26 so was referred to the ER. In the waiting room patient states that she just wants to go home. She states she is feeling very anxious as she missed her klonopin and effexor today and has not had her nebulizer treatment either. She denies n/v/d, ear aches and abdominal pain. Flu Symptoms  Presenting symptoms: cough, fatigue, headache, rhinorrhea and sore throat    Presenting symptoms: no diarrhea, no fever, no nausea, no shortness of breath and no vomiting    Associated symptoms: nasal congestion    Associated symptoms: no chills        Prior to Admission Medications   Prescriptions Last Dose Informant Patient Reported? Taking?    ALPRAZolam (XANAX) 1 mg tablet  Self Yes No   NEEDLE, DISP, 30 G (BD Eclipse Needle) 30G X 1/2" MISC  Self No No   Sig: Use daily   QUEtiapine (SEROquel) 25 mg tablet   Yes No   Sig: TAKE 1 TABLET BY MOUTH AT SUPPERTIME AND 2 TABLETS EVERY NIGHT AT BEDTIME   Sod Picosulfate-Mag Ox-Cit Acd (Clenpiq) 10-3.5-12 MG-GM -GM/160ML SOLN   No No   Sig: Take 1 Package by mouth see administration instructions   Sodium Oxybate (Xyrem) 500 MG/ML SOLN   No No   Sig: First dose (bedtime): 4.5 g + Second dose (2.5-4 hrs later): 4.5 g = 9 g Total Nightly Dose [Total Qty: 1 month(s)]   albuterol (Ventolin HFA) 90 mcg/act inhaler   No No   Sig: Inhale 2 puffs every 4 (four) hours as needed for wheezing   benztropine (COGENTIN) 1 mg tablet Yes No   Sig: Take 1 mg by mouth 2 (two) times a day   clonazePAM (KlonoPIN) 0.5 mg tablet  Self Yes No   Sig: Take 0.5 mg by mouth 2 (two) times a day as needed   clonazePAM (KlonoPIN) 2 mg tablet  Self Yes No   Sig: TAKE 1/2 TABLET BY MOUTH FOUR TIMES DAILY. MAY USE WITH ALPRAZOLAM   cyanocobalamin 1,000 mcg/mL  Self No No   Si ml SQ Every 2 weeks with syringe and needles   econazole nitrate 1 % cream  Self No No   Sig: Apply topically daily   ergocalciferol (VITAMIN D2) 50,000 units  Self No No   Sig: Take 1 capsule (50,000 Units total) by mouth every 14 (fourteen) days   ferrous sulfate 325 (65 Fe) mg tablet  Self No No   Sig: Take 1 tablet (325 mg total) by mouth daily with breakfast   fluticasone (Flovent HFA) 220 mcg/act inhaler   No No   Sig: Inhale 2 puffs 2 (two) times a day Rinse mouth after use.    levalbuterol (Xopenex) 1.25 mg/3 mL nebulizer solution   No No   Sig: Take 3 mL (1.25 mg total) by nebulization every 6 (six) hours as needed for wheezing or shortness of breath   medroxyPROGESTERone (PROVERA) 10 mg tablet   No No   Sig: Take 1 tablet (10 mg total) by mouth daily as needed (for heavy or prolonged bleeding)   venlafaxine (EFFEXOR) 100 MG tablet  Self No No   Sig: Take 1 tablet (100 mg total) by mouth 3 (three) times a day      Facility-Administered Medications: None       Past Medical History:   Diagnosis Date    Anxiety     Asthma     childhood    Depression     Narcolepsy     Obesity        Past Surgical History:   Procedure Laterality Date     SECTION      CHOLECYSTECTOMY      KNEE ARTHROSCOPY Left     MELISSA-EN-Y PROCEDURE         Family History   Problem Relation Age of Onset    Hypertension Mother     Depression Mother     Atrial fibrillation Mother     Hypertension Father     Depression Father     Heart failure Father         heart attack in 35s    No Known Problems Sister     Diabetes Maternal Grandmother     Stroke Maternal Grandmother     Brain cancer Maternal Grandfather Lung cancer Paternal Grandfather     No Known Problems Son     Cystic fibrosis Son     No Known Problems Maternal Aunt     No Known Problems Maternal Aunt     No Known Problems Maternal Aunt     No Known Problems Maternal Aunt     No Known Problems Paternal Aunt     Alcohol abuse Neg Hx     Substance Abuse Neg Hx     Mental illness Neg Hx      I have reviewed and agree with the history as documented. E-Cigarette/Vaping    E-Cigarette Use Never User      E-Cigarette/Vaping Substances    Nicotine No     THC No     CBD No     Flavoring No      Social History     Tobacco Use    Smoking status: Never    Smokeless tobacco: Never   Vaping Use    Vaping Use: Never used   Substance Use Topics    Alcohol use: Not Currently    Drug use: No       Review of Systems   Constitutional:  Positive for activity change, appetite change and fatigue. Negative for chills, diaphoresis and fever. HENT:  Positive for congestion, rhinorrhea, sinus pressure and sore throat. Negative for trouble swallowing. Eyes:  Negative for discharge. Respiratory:  Positive for cough, chest tightness and wheezing. Negative for shortness of breath. Cardiovascular:  Negative for chest pain and palpitations. Gastrointestinal:  Negative for abdominal pain, diarrhea, nausea and vomiting. Musculoskeletal:  Positive for arthralgias. Skin:  Negative for color change, pallor, rash and wound. Neurological:  Positive for headaches. Psychiatric/Behavioral:  The patient is nervous/anxious. Physical Exam  Physical Exam  Vitals and nursing note reviewed. Constitutional:       General: She is not in acute distress. Appearance: Normal appearance. She is obese. She is not ill-appearing, toxic-appearing or diaphoretic. HENT:      Head: Normocephalic and atraumatic.       Right Ear: Tympanic membrane, ear canal and external ear normal.      Left Ear: Tympanic membrane, ear canal and external ear normal.      Nose: Nose normal. No congestion or rhinorrhea. Mouth/Throat:      Mouth: Mucous membranes are moist.      Pharynx: Oropharynx is clear. No oropharyngeal exudate or posterior oropharyngeal erythema. Eyes:      General: No scleral icterus. Right eye: No discharge. Left eye: No discharge. Extraocular Movements: Extraocular movements intact. Conjunctiva/sclera: Conjunctivae normal.   Cardiovascular:      Rate and Rhythm: Regular rhythm. Tachycardia present. Pulses: Normal pulses. Heart sounds: Normal heart sounds. No murmur heard. No friction rub. No gallop. Pulmonary:      Effort: Pulmonary effort is normal. No respiratory distress. Breath sounds: Normal breath sounds. No wheezing, rhonchi or rales. Abdominal:      General: Abdomen is flat. Palpations: Abdomen is soft. Musculoskeletal:         General: Normal range of motion. Cervical back: Normal range of motion. No rigidity. Skin:     General: Skin is warm and dry. Capillary Refill: Capillary refill takes less than 2 seconds. Neurological:      General: No focal deficit present. Mental Status: She is alert and oriented to person, place, and time.    Psychiatric:      Comments: Patient is very anxious         Vital Signs  ED Triage Vitals [11/15/23 1252]   Temperature Pulse Respirations Blood Pressure SpO2   98.9 °F (37.2 °C) (!) 116 22 170/90 100 %      Temp Source Heart Rate Source Patient Position - Orthostatic VS BP Location FiO2 (%)   Oral Monitor Sitting Right arm --      Pain Score       --           Vitals:    11/15/23 1252 11/15/23 1525 11/15/23 1551 11/15/23 1638   BP: 170/90 (!) 210/114 162/82 158/84   Pulse: (!) 116 (!) 123 (!) 121 (!) 122   Patient Position - Orthostatic VS: Sitting Sitting  Sitting         Visual Acuity      ED Medications  Medications   sodium chloride 0.9 % bolus 1,000 mL (1,000 mL Intravenous New Bag 11/15/23 1505)   albuterol inhalation solution 2.5 mg (2.5 mg Nebulization Given 11/15/23 1523)       Diagnostic Studies  Results Reviewed       Procedure Component Value Units Date/Time    UA w Reflex to Microscopic w Reflex to Culture [[id number]]     Lab Status: No result Specimen: Urine     Comprehensive metabolic panel [[id number]]  (Abnormal) Collected: 11/15/23 1503    Lab Status: Final result Specimen: Blood from Arm, Left Updated: 11/15/23 1526     Sodium 137 mmol/L      Potassium 4.5 mmol/L      Chloride 104 mmol/L      CO2 25 mmol/L      ANION GAP 8 mmol/L      BUN 9 mg/dL      Creatinine 0.73 mg/dL      Glucose 105 mg/dL      Calcium 8.6 mg/dL      AST 32 U/L      ALT 30 U/L      Alkaline Phosphatase 164 U/L      Total Protein 7.2 g/dL      Albumin 3.8 g/dL      Total Bilirubin 0.23 mg/dL      eGFR 95 ml/min/1.73sq m     Narrative:      National Kidney Disease Foundation guidelines for Chronic Kidney Disease (CKD):     Stage 1 with normal or high GFR (GFR > 90 mL/min/1.73 square meters)    Stage 2 Mild CKD (GFR = 60-89 mL/min/1.73 square meters)    Stage 3A Moderate CKD (GFR = 45-59 mL/min/1.73 square meters)    Stage 3B Moderate CKD (GFR = 30-44 mL/min/1.73 square meters)    Stage 4 Severe CKD (GFR = 15-29 mL/min/1.73 square meters)    Stage 5 End Stage CKD (GFR <15 mL/min/1.73 square meters)  Note: GFR calculation is accurate only with a steady state creatinine    CBC and differential [[id number]]  (Abnormal) Collected: 11/15/23 1503    Lab Status: Final result Specimen: Blood from Arm, Left Updated: 11/15/23 1511     WBC 5.39 Thousand/uL      RBC 4.37 Million/uL      Hemoglobin 11.3 g/dL      Hematocrit 37.4 %      MCV 86 fL      MCH 25.9 pg      MCHC 30.2 g/dL      RDW 14.9 %      MPV 9.9 fL      Platelets 267 Thousands/uL      nRBC 0 /100 WBCs      Neutrophils Relative 87 %      Immat GRANS % 0 %      Lymphocytes Relative 5 %      Monocytes Relative 7 %      Eosinophils Relative 0 %      Basophils Relative 1 %      Neutrophils Absolute 4.68 Thousands/µL      Immature Grans Absolute 0.01 Thousand/uL      Lymphocytes Absolute 0.26 Thousands/µL      Monocytes Absolute 0.39 Thousand/µL      Eosinophils Absolute 0.02 Thousand/µL      Basophils Absolute 0.03 Thousands/µL                    XR chest 1 view portable   ED Interpretation by Marissa Rossi PA-C (11/15 1659)      Cardiomediastinal silhouette normal.     Lungs clear. No effusion or pneumothorax. Upper abdomen normal. Persistent elevation of the right diaphragm. Bones normal for age. IMPRESSION:     No acute cardiopulmonary disease. Final Result by Gina Funez MD (11/15 1272)      No acute cardiopulmonary disease. Workstation performed: [id number]                    Procedures  Procedures         ED Course  ED Course as of 11/15/23 1701   Wed Nov 15, 2023   1546 Gave patient neb treatment, patient with tachycardia, tachypnea and bp of 210/144. She is very anxious in the room and standing up and moving around, will give patient time to let albuterol wear off and let klonopin and effexor take effect before re-vitaling her   1553 Blood Pressure: 162/82  BP improved, taken manually    1641 Pulse(!): 122  Patient still tachycardic. EKG shows sinus tach with fluctuating rate. Patient in the room looks well, but has the flu and is very anxious. She is requesting to go home and be with her family who also have the flu.    [id number] Will give patient return precautions and send her home per her requests                               SBIRT 22yo+      Flowsheet Row Most Recent Value   Initial Alcohol Screen: US AUDIT-C     1. How often do you have a drink containing alcohol? 0 Filed at: 11/15/2023 1530   2. How many drinks containing alcohol do you have on a typical day you are drinking? 0 Filed at: 11/15/2023 1530   3b. FEMALE Any Age, or MALE 65+: How often do you have 4 or more drinks on one occassion?  0 Filed at: 11/15/2023 1530   Audit-C Score 0 Filed at: 11/15/2023 1530   GIUSEPPE: How many times in the past year have you. .. Used an illegal drug or used a prescription medication for non-medical reasons? Never Filed at: 11/15/2023 1530                      Medical Decision Making  Differential diagnosis includes influenza a, upper respiratory infection, pneumonia, asthma exacerbation, anxiety. Ordered labs to look for infection and check hydration status, ordered cxr to rule out pneumonia. Patient's son and  both influenza a positive. Encouraged supportive care at home. Amount and/or Complexity of Data Reviewed  Labs: ordered. Radiology: ordered. Risk  Prescription drug management. Disposition  Final diagnoses:   Influenza     Time reflects when diagnosis was documented in both MDM as applicable and the Disposition within this note       Time User Action Codes Description Comment    11/15/2023  4:43 PM Vega Garcia Add [J11.1] Influenza           ED Disposition       ED Disposition   Discharge    Condition   Stable    Date/Time   Wed Nov 15, 2023 [id number] discharge to home/self care. Follow-up Information       Follow up With Specialties Details Why Contact Info Additional Information    Ephraim Gold DO Family Medicine   [address] [place] [address]. [address] [facility] Emergency Department Emergency Medicine  If symptoms worsen, As needed [address] [place] [address] [phone] [facility] Emergency Department, [place], [place], [address]            Patient's Medications   Discharge Prescriptions    No medications on file       No discharge procedures on file.     PDMP Review       None            ED Provider  Electronically Signed by             Vega Garcia PA-C  11/15/23 1047

## 2023-11-15 NOTE — DISCHARGE INSTRUCTIONS
Can alternate tylenol and motrin at home as needed for pain and fevers. Prioritize hydration and rest. Follow up with PCP as needed. Continue nebulizer treatments as needed for wheezing. Return to the ER if your symptoms worsen, you experience palpitations or chest pain, difficulty breathing or have a persistent fever that will not go down with medication.

## 2023-11-15 NOTE — PROGRESS NOTES
Name: Ruby Gonzalez      : 1970      MRN: [id number]  Encounter Provider: Michael Davis DO  Encounter Date: 11/15/2023   Encounter department: [facility]     1. Influenza A  Comments:  likely flu A given  and son are confirmed  pt has asthma and feels like something is sitting on her chest.  xopenex 3 hours ago not helpful. now with pulse of 128 and tachypnea 26. Given VS, cannot send her home. Refer to ED for fluids and stabilization. 2. Tachycardia  Comments:  ED    3. Tachypnea  Comments:  ED           Subjective      HPI  Pt presents with 2-3 days of cough, congestion, st, body aches. No fevers. +shortness of breath.  +sensation of something sitting on her chest.  She is tachypneic and tachycardic. Used xopenex 3 hours ago. Both  and son have flu A. Review of Systems  See hpi    Current Outpatient Medications on File Prior to Visit   Medication Sig    albuterol (Ventolin HFA) 90 mcg/act inhaler Inhale 2 puffs every 4 (four) hours as needed for wheezing    ALPRAZolam (XANAX) 1 mg tablet     benztropine (COGENTIN) 1 mg tablet Take 1 mg by mouth 2 (two) times a day    clonazePAM (KlonoPIN) 0.5 mg tablet Take 0.5 mg by mouth 2 (two) times a day as needed    clonazePAM (KlonoPIN) 2 mg tablet TAKE 1/2 TABLET BY MOUTH FOUR TIMES DAILY. MAY USE WITH ALPRAZOLAM    cyanocobalamin 1,000 mcg/mL 1 ml SQ Every 2 weeks with syringe and needles    econazole nitrate 1 % cream Apply topically daily    ergocalciferol (VITAMIN D2) 50,000 units Take 1 capsule (50,000 Units total) by mouth every 14 (fourteen) days    ferrous sulfate 325 (65 Fe) mg tablet Take 1 tablet (325 mg total) by mouth daily with breakfast    fluticasone (Flovent HFA) 220 mcg/act inhaler Inhale 2 puffs 2 (two) times a day Rinse mouth after use.     levalbuterol (Xopenex) 1.25 mg/3 mL nebulizer solution Take 3 mL (1.25 mg total) by nebulization every 6 (six) hours as needed for wheezing or shortness of breath    medroxyPROGESTERone (PROVERA) 10 mg tablet Take 1 tablet (10 mg total) by mouth daily as needed (for heavy or prolonged bleeding)    NEEDLE, DISP, 30 G (BD Eclipse Needle) 30G X 1/2" MISC Use daily    QUEtiapine (SEROquel) 25 mg tablet TAKE 1 TABLET BY MOUTH AT SUPPERTIME AND 2 TABLETS EVERY NIGHT AT BEDTIME    Sod Picosulfate-Mag Ox-Cit Acd (Clenpiq) 10-3.5-12 MG-GM -GM/160ML SOLN Take 1 Package by mouth see administration instructions    Sodium Oxybate (Xyrem) 500 MG/ML SOLN First dose (bedtime): 4.5 g + Second dose (2.5-4 hrs later): 4.5 g = 9 g Total Nightly Dose [Total Qty: 1 month(s)]    venlafaxine (EFFEXOR) 100 MG tablet Take 1 tablet (100 mg total) by mouth 3 (three) times a day       Objective     /80   Pulse (!) 128   Temp 98 °F (36.7 °C)   Resp (!) 26   Ht 5' 4" (1.626 m)   Wt 132 kg (291 lb)   SpO2 98%   BMI 49.95 kg/m²     Physical Exam  Constitutional:       General: She is not in acute distress. Appearance: She is well-developed. HENT:      Head: Normocephalic and atraumatic. Right Ear: Tympanic membrane, ear canal and external ear normal.      Left Ear: Tympanic membrane, ear canal and external ear normal.      Nose: Mucosal edema present. Mouth/Throat:      Pharynx: Posterior oropharyngeal erythema present. No oropharyngeal exudate. Eyes:      Conjunctiva/sclera: Conjunctivae normal.      Pupils: Pupils are equal, round, and reactive to light. Cardiovascular:      Rate and Rhythm: Regular rhythm. Tachycardia present. Heart sounds: S1 normal and S2 normal. No murmur heard. No friction rub. No gallop. No S3 or S4 sounds. Pulmonary:      Effort: Respiratory distress present. Breath sounds: Normal breath sounds. No wheezing or rhonchi. Lymphadenopathy:      Cervical: No cervical adenopathy. Neurological:      General: No focal deficit present. Mental Status: She is oriented to person, place, and time. Denzel Moulton, DO

## 2023-11-15 NOTE — TELEPHONE ENCOUNTER
PT's  called and they just got home from ER, pt has influenza but was not given tamiflu.  states he was seen in ER for same thing and they prescribed it for him. Office is currently closed, advised pt to call ER and see if Dr can call it in for her. Please follow up and see if resolved, or if Dr. Eric Benavides can call it in daniel for her.

## 2023-11-16 ENCOUNTER — TELEPHONE (OUTPATIENT)
Dept: FAMILY MEDICINE CLINIC | Facility: CLINIC | Age: 53
End: 2023-11-16

## 2023-11-16 DIAGNOSIS — J45.21 MILD INTERMITTENT ASTHMA WITH EXACERBATION: ICD-10-CM

## 2023-11-16 RX ORDER — LEVALBUTEROL INHALATION SOLUTION 1.25 MG/3ML
1.25 SOLUTION RESPIRATORY (INHALATION) EVERY 6 HOURS PRN
Qty: 75 ML | Refills: 0 | Status: SHIPPED | OUTPATIENT
Start: 2023-11-16

## 2023-11-16 RX ORDER — OSELTAMIVIR PHOSPHATE 75 MG/1
75 CAPSULE ORAL EVERY 12 HOURS SCHEDULED
Qty: 10 CAPSULE | Refills: 0 | Status: SHIPPED | OUTPATIENT
Start: 2023-11-16 | End: 2023-11-21

## 2023-11-16 NOTE — TELEPHONE ENCOUNTER
Good morning Dr Hugh Solis. Omkar Farrell and Eros Patel both saw Dr. Kenna Leventhal yesterday and today and she prescribed both of them new nebulizers. Unfortunately, she forgot to prescribe the new solution for the nebulizer. Could you ask her to call a prescription in to [address] [facility] on [facility] for the levalbuterol oh, inhalation solution USP 1.25 mg three ML. I would ask her myself but I don’t have any way of sending her a message. She was very nice to both.        From [facility] Mychart message

## 2023-11-16 NOTE — TELEPHONE ENCOUNTER
Called Connor STYLES letting him know the medication has been sent in. Call the office back with any questions.

## 2023-11-17 LAB
DME PARACHUTE DELIVERY DATE ACTUAL: NORMAL
DME PARACHUTE DELIVERY DATE REQUESTED: NORMAL
DME PARACHUTE ITEM DESCRIPTION: NORMAL
DME PARACHUTE ORDER STATUS: NORMAL
DME PARACHUTE SUPPLIER NAME: NORMAL
DME PARACHUTE SUPPLIER PHONE: NORMAL

## 2024-02-08 NOTE — TELEPHONE ENCOUNTER
Pt's  called and states pt has a UTI and would like something called in for her  Explained that pt would need to be seen in order to get a prescription, if indicated  They are out of town at the moment and unable to come in for an appt  Recommended urgent care evaluation at this time  Pt will be seen at an urgent care  No

## 2024-03-13 ENCOUNTER — APPOINTMENT (EMERGENCY)
Dept: RADIOLOGY | Facility: HOSPITAL | Age: 54
End: 2024-03-13
Payer: COMMERCIAL

## 2024-03-13 ENCOUNTER — HOSPITAL ENCOUNTER (EMERGENCY)
Facility: HOSPITAL | Age: 54
Discharge: HOME | End: 2024-03-13
Attending: EMERGENCY MEDICINE
Payer: COMMERCIAL

## 2024-03-13 VITALS
HEART RATE: 107 BPM | OXYGEN SATURATION: 97 % | DIASTOLIC BLOOD PRESSURE: 75 MMHG | RESPIRATION RATE: 18 BRPM | TEMPERATURE: 99.1 F | SYSTOLIC BLOOD PRESSURE: 134 MMHG

## 2024-03-13 DIAGNOSIS — M25.522 LEFT ELBOW PAIN: ICD-10-CM

## 2024-03-13 DIAGNOSIS — M25.511 RIGHT SHOULDER PAIN: ICD-10-CM
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
## 2024-03-13 DIAGNOSIS — I77.810 MILD ASCENDING AORTA DILATATION (HCC): ICD-10-CM

## 2024-03-13 PROCEDURE — 99284 EMERGENCY DEPT VISIT MOD MDM: CPT

## 2024-03-13 PROCEDURE — 99285 EMERGENCY DEPT VISIT HI MDM: CPT | Performed by: EMERGENCY MEDICINE

## 2024-03-13 PROCEDURE — 73030 X-RAY EXAM OF SHOULDER: CPT

## 2024-03-13 PROCEDURE — 93005 ELECTROCARDIOGRAM TRACING: CPT

## 2024-03-13 PROCEDURE — 70450 CT HEAD/BRAIN W/O DYE: CPT

## 2024-03-13 PROCEDURE — 73070 X-RAY EXAM OF ELBOW: CPT

## 2024-03-13 PROCEDURE — 72125 CT NECK SPINE W/O DYE: CPT

## 2024-03-13 PROCEDURE — 71250 CT THORAX DX C-: CPT

## 2024-03-13 PROCEDURE — 96372 THER/PROPH/DIAG INJ SC/IM: CPT

## 2024-03-13 RX ORDER — IBUPROFEN 400 MG/1
400 TABLET ORAL ONCE
Status: DISCONTINUED | OUTPATIENT
Start: 2024-03-13 | End: 2024-03-13

## 2024-03-13 RX ORDER — CLONAZEPAM 1 MG/1
1 TABLET ORAL ONCE
Status: COMPLETED | OUTPATIENT
Start: 2024-03-13 | End: 2024-03-13

## 2024-03-13 RX ORDER — KETOROLAC TROMETHAMINE 30 MG/ML
15 INJECTION, SOLUTION INTRAMUSCULAR; INTRAVENOUS ONCE
Status: COMPLETED | OUTPATIENT
Start: 2024-03-13 | End: 2024-03-13

## 2024-03-13 RX ADMIN — CLONAZEPAM 1 MG: 1 TABLET ORAL at 18:59

## 2024-03-13 RX ADMIN — KETOROLAC TROMETHAMINE 15 MG: 30 INJECTION, SOLUTION INTRAMUSCULAR; INTRAVENOUS at 18:59
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
## 2024-03-13 NOTE — Clinical Note
Leticia Pderoza was seen and treated in our emergency department on 3/13/2024.                Diagnosis:     Leticia  .    She may return on this date:          If you have any questions or concerns, please don't hesitate to call.      Darian Mortensen MD    ______________________________           _______________          _______________  Hospital Representative                              Date                                Time
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
## 2024-03-14 LAB
ATRIAL RATE: 93 BPM
P AXIS: 39 DEGREES
PR INTERVAL: 160 MS
QRS AXIS: 57 DEGREES
QRSD INTERVAL: 84 MS
QT INTERVAL: 344 MS
QTC INTERVAL: 427 MS
T WAVE AXIS: 37 DEGREES
VENTRICULAR RATE: 93 BPM

## 2024-03-14 PROCEDURE — 93010 ELECTROCARDIOGRAM REPORT: CPT | Performed by: INTERNAL MEDICINE
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
## 2024-03-25 ENCOUNTER — OFFICE VISIT (OUTPATIENT)
Dept: CARDIAC SURGERY | Facility: CLINIC | Age: 54
End: 2024-03-25
Payer: COMMERCIAL

## 2024-03-25 VITALS
DIASTOLIC BLOOD PRESSURE: 79 MMHG | WEIGHT: 285.1 LBS | HEIGHT: 64 IN | OXYGEN SATURATION: 99 % | BODY MASS INDEX: 48.67 KG/M2 | SYSTOLIC BLOOD PRESSURE: 160 MMHG | HEART RATE: 88 BPM

## 2024-03-25 DIAGNOSIS — I77.810 AORTIC ECTASIA, THORACIC (HCC): Primary | ICD-10-CM

## 2024-03-25 PROCEDURE — 99244 OFF/OP CNSLTJ NEW/EST MOD 40: CPT | Performed by: NURSE PRACTITIONER

## 2024-03-25 RX ORDER — QUETIAPINE FUMARATE 100 MG/1
100 TABLET, FILM COATED ORAL
COMMUNITY
Start: 2024-02-23
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
## 2024-04-02 ENCOUNTER — OFFICE VISIT (OUTPATIENT)
Dept: OBGYN CLINIC | Facility: CLINIC | Age: 54
End: 2024-04-02
Payer: COMMERCIAL

## 2024-04-02 ENCOUNTER — APPOINTMENT (OUTPATIENT)
Dept: RADIOLOGY | Facility: AMBULARY SURGERY CENTER | Age: 54
End: 2024-04-02
Attending: STUDENT IN AN ORGANIZED HEALTH CARE EDUCATION/TRAINING PROGRAM
Payer: COMMERCIAL

## 2024-04-02 VITALS — BODY MASS INDEX: 48.67 KG/M2 | WEIGHT: 285.1 LBS | HEIGHT: 64 IN

## 2024-04-02 DIAGNOSIS — M25.561 RIGHT KNEE PAIN, UNSPECIFIED CHRONICITY: Primary | ICD-10-CM

## 2024-04-02 DIAGNOSIS — M25.561 RIGHT KNEE PAIN, UNSPECIFIED CHRONICITY: ICD-10-CM

## 2024-04-02 PROCEDURE — 73562 X-RAY EXAM OF KNEE 3: CPT

## 2024-04-02 PROCEDURE — 99214 OFFICE O/P EST MOD 30 MIN: CPT | Performed by: STUDENT IN AN ORGANIZED HEALTH CARE EDUCATION/TRAINING PROGRAM

## 2024-04-02 RX ORDER — MELOXICAM 15 MG/1
15 TABLET ORAL DAILY
Qty: 30 TABLET | Refills: 2 | Status: SHIPPED | OUTPATIENT
Start: 2024-04-02
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
## 2024-04-18 ENCOUNTER — NURSE TRIAGE (OUTPATIENT)
Age: 54
End: 2024-04-18

## 2024-04-18 ENCOUNTER — TELEPHONE (OUTPATIENT)
Dept: FAMILY MEDICINE CLINIC | Facility: CLINIC | Age: 54
End: 2024-04-18

## 2024-04-18 ENCOUNTER — HOSPITAL ENCOUNTER (OUTPATIENT)
Dept: NON INVASIVE DIAGNOSTICS | Facility: CLINIC | Age: 54
Discharge: HOME | End: 2024-04-18
Payer: COMMERCIAL

## 2024-04-18 VITALS
HEIGHT: 64 IN | BODY MASS INDEX: 48.65 KG/M2 | DIASTOLIC BLOOD PRESSURE: 79 MMHG | SYSTOLIC BLOOD PRESSURE: 160 MMHG | HEART RATE: 88 BPM | WEIGHT: 285 LBS

## 2024-04-18 DIAGNOSIS — I77.810 AORTIC ECTASIA, THORACIC (HCC): ICD-10-CM

## 2024-04-18 LAB
AORTIC ROOT: 3.3 CM
AORTIC VALVE ANNULUS: 2 CM
APICAL FOUR CHAMBER EJECTION FRACTION: 63 %
ASCENDING AORTA: 3.9 CM
BSA FOR ECHO PROCEDURE: 2.27 M2
E WAVE DECELERATION TIME: 126 MS
E/A RATIO: 0.77
FRACTIONAL SHORTENING: 44 (ref 28–44)
INTERVENTRICULAR SEPTUM IN DIASTOLE (PARASTERNAL SHORT AXIS VIEW): 1.2 CM
INTERVENTRICULAR SEPTUM: 1.2 CM (ref 0.6–1.1)
LAAS-AP2: 20.2 CM2
LAAS-AP4: 15.2 CM2
LEFT ATRIUM SIZE: 4.8 CM
LEFT ATRIUM VOLUME (MOD BIPLANE): 42 ML
LEFT ATRIUM VOLUME INDEX (MOD BIPLANE): 18.5 ML/M2
LEFT INTERNAL DIMENSION IN SYSTOLE: 2.5 CM (ref 2.1–4)
LEFT VENTRICULAR INTERNAL DIMENSION IN DIASTOLE: 4.5 CM (ref 3.5–6)
LEFT VENTRICULAR POSTERIOR WALL IN END DIASTOLE: 1.2 CM
LEFT VENTRICULAR STROKE VOLUME: 72 ML
LVSV (TEICH): 72 ML
MV E'TISSUE VEL-LAT: 13 CM/S
MV E'TISSUE VEL-SEP: 5 CM/S
MV PEAK A VEL: 1.08 M/S
MV PEAK E VEL: 83 CM/S
MV STENOSIS PRESSURE HALF TIME: 37 MS
MV VALVE AREA P 1/2 METHOD: 5.95
RIGHT ATRIUM AREA SYSTOLE A4C: 13.9 CM2
RIGHT VENTRICLE ID DIMENSION: 5 CM
SINOTUBULAR JUNCTION: 3 CM
SL CV LEFT ATRIUM LENGTH A2C: 5.9 CM
SL CV LV EF: 65
SL CV PED ECHO LEFT VENTRICLE DIASTOLIC VOLUME (MOD BIPLANE) 2D: 94 ML
SL CV PED ECHO LEFT VENTRICLE SYSTOLIC VOLUME (MOD BIPLANE) 2D: 22 ML
SL CV SINUS OF VALSALVA 2D: 3.7 CM
STJ: 3 CM

## 2024-04-18 PROCEDURE — 93306 TTE W/DOPPLER COMPLETE: CPT

## 2024-04-18 PROCEDURE — 93306 TTE W/DOPPLER COMPLETE: CPT | Performed by: INTERNAL MEDICINE
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
## 2024-04-18 NOTE — TELEPHONE ENCOUNTER
"Patient was calling in regards to a recent CT scan she had done, and it confirmed she had \"spots\" on her kidneys, and they were there for 2 years without any growth. Patient was calling to see if she can get an appointment made? She does not have a referral. She said she had a kidney stone a few years back, and she had it removed, but claimed that pain never fully went away. I'm not sure if this is something that would require a nephrologist, but patient was adamant about scheduling an appointment. She said she is closest to the [facility]. Can she be called to let her know if this appointment can be made? A pep did transfer the call to me, and recommended that the patient see her PCP, but she refused.         Reason for Disposition   Mild flank pain and present < 3 days    Answer Assessment - Initial Assessment Questions  1. LOCATION: \"Where does it hurt?\" (e.g., left, right)      All over   2. ONSET: \"When did the pain start?\"      A while, 3-4 years ago she had a kidney stone   3. SEVERITY: \"How bad is the pain?\" (e.g., Scale 1-10; mild, moderate, or severe)    - MILD (1-3): doesn't interfere with normal activities     - MODERATE (4-7): interferes with normal activities or awakens from sleep     - SEVERE (8-10): excruciating pain and patient unable to do normal activities (stays in bed)        2/3  4. PATTERN: \"Does the pain come and go, or is it constant?\"       Comes and goes  5. CAUSE: \"What do you think is causing the pain?\"      CT done and showed something on her kidneys   6. OTHER SYMPTOMS:  \"Do you have any other symptoms?\" (e.g., fever, abdominal pain, vomiting, leg weakness, burning with urination, blood in urine)      denies    Protocols used: Flank Pain-ADULT-[place]    "

## 2024-04-24 NOTE — TELEPHONE ENCOUNTER
From: Brittany Flowers  To: Hardy Abreu  Sent: 10/12/2021 12:30 PM CDT  Subject: Follow up appointment     Good Afternoon when should I make an follow up appointment with you so you can look at my foot    Leticia [phone].  I spoke to patient and discussed  message she understood and felt comfortable with findings. She will follow up with PCP and call us in the future if she requires nephrology consultation. Patient denies any flank pain, recurrent kidney stones at this time. She advised she only had one occurrence with renal stone and it was removed otherwise.

## 2024-10-29 ENCOUNTER — TELEPHONE (OUTPATIENT)
Dept: FAMILY MEDICINE CLINIC | Facility: CLINIC | Age: 54
End: 2024-10-29

## 2024-10-29 ENCOUNTER — TELEPHONE (OUTPATIENT)
Age: 54
End: 2024-10-29

## 2024-10-29 ENCOUNTER — PATIENT MESSAGE (OUTPATIENT)
Dept: FAMILY MEDICINE CLINIC | Facility: CLINIC | Age: 54
End: 2024-10-29

## 2024-10-29 NOTE — TELEPHONE ENCOUNTER
"Patient advised. Upset that these would not be prescribed. \"She just going to have me be in pain\" I told patient she could discuss at her office visit. She said she was finding another provider. (I will call tomorrow to confirm to cancel appointment)  "

## 2024-10-29 NOTE — TELEPHONE ENCOUNTER
LVM for patient to schedule appointment.    Appointment Request From: Leticia Pedroza     With Provider: Anushka Arora DO [[facility]]     Preferred Date Range: 10/30/2024 - 11/30/2024     Preferred Times: Any Time     Reason for visit: Primary Care Physical     Comments:  I have gotten my flu and covid vaccines through [facility].

## 2024-10-29 NOTE — TELEPHONE ENCOUNTER
Neither of these medications are on her list and I do not prescribe them. She has not been seen here since 6/2023

## 2024-10-29 NOTE — TELEPHONE ENCOUNTER
Please call pt back - I now see a second message from her saying I want her on metoprolol. I did not recommend this. She saw CT surgery who asked her to monitor blood pressure, and she saw nephrology. Please investigate if one of them recommended the medication and she can schedule with them. I also see metoprolol was on her list and says I stopped it in June, but I did not see her in June and was not even in the office.

## 2024-10-29 NOTE — TELEPHONE ENCOUNTER
Patient scheduled F/U for 10/31/24 at 12:00. She was asking for refill of Valacyclovir and Metoprolol.  Please advise.  Thank you.

## 2024-10-30 ENCOUNTER — PATIENT MESSAGE (OUTPATIENT)
Dept: FAMILY MEDICINE CLINIC | Facility: CLINIC | Age: 54
End: 2024-10-30

## 2024-10-30 DIAGNOSIS — E55.9 VITAMIN D DEFICIENCY: ICD-10-CM

## 2024-10-30 DIAGNOSIS — E53.8 B12 DEFICIENCY: ICD-10-CM

## 2024-10-30 DIAGNOSIS — D64.89 ANEMIA DUE TO OTHER CAUSE, NOT CLASSIFIED: ICD-10-CM

## 2024-10-30 PROBLEM — R19.5 POSITIVE COLORECTAL CANCER SCREENING USING COLOGUARD TEST: Status: RESOLVED | Noted: 2022-07-26 | Resolved: 2024-10-30

## 2024-10-30 PROBLEM — D50.9 IRON DEFICIENCY ANEMIA: Status: ACTIVE | Noted: 2018-10-04

## 2024-10-30 NOTE — TELEPHONE ENCOUNTER
Patient doesn't remember who prescribe the Metoprolol. She is more upset about the fever blister medication because the bottle she had last time has you as prescribing doctor. She will bring bottle to her appointment tomorrow.

## 2024-10-31 ENCOUNTER — OFFICE VISIT (OUTPATIENT)
Dept: FAMILY MEDICINE CLINIC | Facility: CLINIC | Age: 54
End: 2024-10-31
Payer: COMMERCIAL

## 2024-10-31 VITALS
RESPIRATION RATE: 16 BRPM | SYSTOLIC BLOOD PRESSURE: 128 MMHG | TEMPERATURE: 98 F | WEIGHT: 286 LBS | HEIGHT: 64 IN | HEART RATE: 123 BPM | OXYGEN SATURATION: 95 % | BODY MASS INDEX: 48.83 KG/M2 | DIASTOLIC BLOOD PRESSURE: 84 MMHG

## 2024-10-31 DIAGNOSIS — Z00.00 WELL ADULT EXAM: Primary | ICD-10-CM

## 2024-10-31 DIAGNOSIS — E66.01 CLASS 3 SEVERE OBESITY DUE TO EXCESS CALORIES WITH SERIOUS COMORBIDITY AND BODY MASS INDEX (BMI) OF 45.0 TO 49.9 IN ADULT (HCC): ICD-10-CM

## 2024-10-31 DIAGNOSIS — Z00.6 ENCOUNTER FOR EXAMINATION FOR NORMAL COMPARISON OR CONTROL IN CLINICAL RESEARCH PROGRAM: ICD-10-CM

## 2024-10-31 DIAGNOSIS — E78.00 HYPERCHOLESTEREMIA: ICD-10-CM

## 2024-10-31 DIAGNOSIS — R74.8 ELEVATED ALKALINE PHOSPHATASE LEVEL: ICD-10-CM

## 2024-10-31 DIAGNOSIS — Z12.31 ENCOUNTER FOR SCREENING MAMMOGRAM FOR BREAST CANCER: ICD-10-CM

## 2024-10-31 DIAGNOSIS — D50.8 OTHER IRON DEFICIENCY ANEMIA: ICD-10-CM

## 2024-10-31 DIAGNOSIS — R79.0 LOW FERRITIN: ICD-10-CM

## 2024-10-31 DIAGNOSIS — F33.3 SEVERE EPISODE OF RECURRENT MAJOR DEPRESSIVE DISORDER, WITH PSYCHOTIC FEATURES (HCC): ICD-10-CM

## 2024-10-31 DIAGNOSIS — E55.9 VITAMIN D DEFICIENCY: ICD-10-CM

## 2024-10-31 DIAGNOSIS — Z98.84 S/P GASTRIC BYPASS: ICD-10-CM

## 2024-10-31 DIAGNOSIS — E66.813 CLASS 3 SEVERE OBESITY DUE TO EXCESS CALORIES WITH SERIOUS COMORBIDITY AND BODY MASS INDEX (BMI) OF 45.0 TO 49.9 IN ADULT (HCC): ICD-10-CM

## 2024-10-31 DIAGNOSIS — Z12.31 SCREENING MAMMOGRAM, ENCOUNTER FOR: ICD-10-CM

## 2024-10-31 DIAGNOSIS — E53.8 VITAMIN B12 DEFICIENCY: ICD-10-CM

## 2024-10-31 DIAGNOSIS — I77.810 AORTIC ECTASIA, THORACIC (HCC): ICD-10-CM

## 2024-10-31 DIAGNOSIS — R73.03 PREDIABETES: ICD-10-CM

## 2024-10-31 DIAGNOSIS — B00.1 RECURRENT COLD SORES: ICD-10-CM

## 2024-10-31 PROCEDURE — 99214 OFFICE O/P EST MOD 30 MIN: CPT | Performed by: FAMILY MEDICINE

## 2024-10-31 PROCEDURE — 99396 PREV VISIT EST AGE 40-64: CPT | Performed by: FAMILY MEDICINE

## 2024-10-31 RX ORDER — CETIRIZINE HYDROCHLORIDE 10 MG/1
TABLET ORAL
COMMUNITY

## 2024-10-31 RX ORDER — VALACYCLOVIR HYDROCHLORIDE 1 G/1
2000 TABLET, FILM COATED ORAL 2 TIMES DAILY
Qty: 12 TABLET | Refills: 5 | Status: SHIPPED | OUTPATIENT
Start: 2024-10-31 | End: 2024-11-01

## 2024-10-31 RX ORDER — VALACYCLOVIR HYDROCHLORIDE 1 G/1
TABLET, FILM COATED ORAL
COMMUNITY
End: 2024-10-31

## 2024-10-31 NOTE — ASSESSMENT & PLAN NOTE
Update labs   Not taking vitamins for a while   Orders:    CBC and differential; Future    Comprehensive metabolic panel; Future    Ferritin; Future    Lipid panel; Future    TSH, 3rd generation; Future    Vitamin D 25 hydroxy; Future    Hemoglobin A1C; Future    Hepatic function panel; Future

## 2024-10-31 NOTE — ASSESSMENT & PLAN NOTE
Encouraged diet and exercise to help avoid weight related complications.  She will check on coverage for weight loss meds (GLP) - discuss in more detail at follow up   No personal or family history of medullary thyroid cancer, MEN   No history of pancreatitis  S/p cait     Orders:    CBC and differential; Future    Comprehensive metabolic panel; Future    Ferritin; Future    Lipid panel; Future    TSH, 3rd generation; Future    Vitamin D 25 hydroxy; Future    Hemoglobin A1C; Future    Hepatic function panel; Future

## 2024-10-31 NOTE — ASSESSMENT & PLAN NOTE
Update labs   Orders:    CBC and differential; Future    Comprehensive metabolic panel; Future    Ferritin; Future    Lipid panel; Future    TSH, 3rd generation; Future    Vitamin D 25 hydroxy; Future    Hemoglobin A1C; Future    Hepatic function panel; Future

## 2024-10-31 NOTE — PROGRESS NOTES
Assessment/Plan:     Assessment & Plan  Well adult exam  See below       Prediabetes  Update labs        S/P gastric bypass  Update labs   Not taking vitamins for a while   Orders:    CBC and differential; Future    Comprehensive metabolic panel; Future    Ferritin; Future    Lipid panel; Future    TSH, 3rd generation; Future    Vitamin D 25 hydroxy; Future    Hemoglobin A1C; Future    Hepatic function panel; Future    Vitamin D deficiency  Update labs   Orders:    CBC and differential; Future    Comprehensive metabolic panel; Future    Ferritin; Future    Lipid panel; Future    TSH, 3rd generation; Future    Vitamin D 25 hydroxy; Future    Hemoglobin A1C; Future    Hepatic function panel; Future    Low ferritin  Update labs   Orders:    CBC and differential; Future    Comprehensive metabolic panel; Future    Ferritin; Future    Lipid panel; Future    TSH, 3rd generation; Future    Vitamin D 25 hydroxy; Future    Hemoglobin A1C; Future    Hepatic function panel; Future    Elevated alkaline phosphatase level  Update labs   Orders:    CBC and differential; Future    Comprehensive metabolic panel; Future    Ferritin; Future    Lipid panel; Future    TSH, 3rd generation; Future    Vitamin D 25 hydroxy; Future    Hemoglobin A1C; Future    Hepatic function panel; Future    Aortic ectasia, thoracic (HCC)  Needs annual CT scan of chest. Following with CT surgery.        Hypercholesteremia  Update labs   Orders:    CBC and differential; Future    Comprehensive metabolic panel; Future    Ferritin; Future    Lipid panel; Future    TSH, 3rd generation; Future    Vitamin D 25 hydroxy; Future    Hemoglobin A1C; Future    Hepatic function panel; Future    Class 3 severe obesity due to excess calories with serious comorbidity and body mass index (BMI) of 45.0 to 49.9 in adult (HCC)  Encouraged diet and exercise to help avoid weight related complications.  She will check on coverage for weight loss meds (GLP) - discuss in more  detail at follow up   No personal or family history of medullary thyroid cancer, MEN   No history of pancreatitis  S/p cait     Orders:    CBC and differential; Future    Comprehensive metabolic panel; Future    Ferritin; Future    Lipid panel; Future    TSH, 3rd generation; Future    Vitamin D 25 hydroxy; Future    Hemoglobin A1C; Future    Hepatic function panel; Future    Other iron deficiency anemia  Update labs   Orders:    CBC and differential; Future    Comprehensive metabolic panel; Future    Ferritin; Future    Lipid panel; Future    TSH, 3rd generation; Future    Vitamin D 25 hydroxy; Future    Hemoglobin A1C; Future    Hepatic function panel; Future    Vitamin B12 deficiency  Update labs  Cannot tolerate oral b12   Orders:    CBC and differential; Future    Comprehensive metabolic panel; Future    Ferritin; Future    Lipid panel; Future    TSH, 3rd generation; Future    Vitamin D 25 hydroxy; Future    Hemoglobin A1C; Future    Hepatic function panel; Future    Encounter for screening mammogram for breast cancer    Orders:    Mammo screening bilateral w 3d and cad; Future    Screening mammogram, encounter for         Recurrent cold sores    Orders:    valACYclovir (VALTREX) 1,000 mg tablet; Take 2 tablets (2,000 mg total) by mouth 2 (two) times a day for 1 day    Severe episode of recurrent major depressive disorder, with psychotic features (HCC)  Depression Screening Follow-up Plan: Patient's depression screening was positive with a PHQ-9 score of 8. Continue regular follow-up with their psychologist/therapist/psychiatrist who is managing their mental health condition(s).              Well adult exam  ·         Continue healthy diet   ·         Encourage exercise 4 times a week or more for minimum 30 minutes.   ·         Continue to see dentist, wear seatbelt.  ·         Health maintenance reviewed - refer for mammo   Reviewed age appropriate health maintenance screenings and immunizations that are due,  risks and benefits of these.   Health Maintenance   Topic Date Due    Depression Follow-up Plan  Never done    Annual Physical  2023    Breast Cancer Screening: Mammogram  10/05/2023    Depression Screening  10/31/2025    Cervical Cancer Screening  2027    DTaP,Tdap,and Td Vaccines (2 - Td or Tdap) 2030    RSV Vaccine Age 60+ Years (1 - 1-dose 60+ series) 2032    Colorectal Cancer Screening  10/07/2032    HIV Screening  Completed    Hepatitis C Screening  Completed    Zoster Vaccine  Completed    Pneumococcal Vaccine: Pediatrics (0 to 5 Years) and At-Risk Patients (6 to 64 Years)  Completed    Influenza Vaccine  Completed    COVID-19 Vaccine  Completed    RSV Vaccine age 0-20 Months  Aged Out    HIB Vaccine  Aged Out    IPV Vaccine  Aged Out    Hepatitis A Vaccine  Aged Out    Meningococcal ACWY Vaccine  Aged Out    HPV Vaccine  Aged Out     Return for virtual review results .    Subjective:    DANNI Kelly is a 54 y.o. female who presents today for a physical.     Chief Complaint   Patient presents with    Physical Exam         Patient Care Team:  Anushka Arora DO as PCP - General (Family Medicine)  Bhumi Light MD (Obstetrics and Gynecology)  Bret Lozoya MD (Psychiatry)  Laci Peralta MD (Sleep Medicine)    PHQ-2/9 Depression Screening    Little interest or pleasure in doing things: 3 - nearly every day  Feeling down, depressed, or hopeless: 1 - several days  Trouble falling or staying asleep, or sleeping too much: 1 - several days  Feeling tired or having little energy: 1 - several days  Poor appetite or overeatin - several days  Feeling bad about yourself - or that you are a failure or have let yourself or your family down: 0 - not at all  Trouble concentrating on things, such as reading the newspaper or watching television: 1 - several days  Moving or speaking so slowly that other people could have noticed. Or the opposite - being so fidgety or restless that you have been  moving around a lot more than usual: 0 - not at all  Thoughts that you would be better off dead, or of hurting yourself in some way: 0 - not at all  PHQ-9 Score: 8  PHQ-9 Interpretation: Mild depression            ---Above per clinical staff & reviewed. ---  Patient here today for a physical:    Diet: not much   Exercise:  joint pain, not able to   Dental visits:  no   Sleep: 2.5 - 3 hours, good quality    Concerns today:  Upset about not getting refills she requested   Doing well recently   Admits she eats late at night and gets more anxious at night   Feels like she is gaining weight   She was getting tremors on her previous medicine - this has been better   Sweating more   Since last visit dx with Kidney stones   Cannot take b vitamins - shots are not as bad             The following portions of the patient's history were reviewed and updated as appropriate: allergies, current medications, past family history, past medical history, past social history, past surgical history and problem list.     Current Medications:  Current Outpatient Medications   Medication Sig Dispense Refill    albuterol (Ventolin HFA) 90 mcg/act inhaler Inhale 2 puffs every 4 (four) hours as needed for wheezing 18 g 0    ALPRAZolam (XANAX) 1 mg tablet Take 1 mg by mouth if needed      cetirizine (ZyrTEC Allergy) 10 mg tablet       clonazePAM (KlonoPIN) 2 mg tablet TAKE 1/2 TABLET BY MOUTH FOUR TIMES DAILY. MAY USE WITH ALPRAZOLAM      Diclofenac Sodium (VOLTAREN) 1 % Apply 2 g topically 4 (four) times a day 150 g 1    fluticasone (Flovent HFA) 220 mcg/act inhaler Inhale 2 puffs 2 (two) times a day Rinse mouth after use. 12 g 0    levalbuterol (Xopenex) 1.25 mg/3 mL nebulizer solution Take 3 mL (1.25 mg total) by nebulization every 6 (six) hours as needed for wheezing or shortness of breath 75 mL 0    QUEtiapine (SEROquel) 100 mg tablet Take 100 mg by mouth      Sodium Oxybate (Xyrem) 500 MG/ML SOLN First dose (bedtime): 4.5 g + Second dose  "(2.5-4 hrs later): 4.5 g = 9 g Total Nightly Dose [Total Qty: 1 month(s)] 540 mL 4    valACYclovir (VALTREX) 1,000 mg tablet Take 2 tablets (2,000 mg total) by mouth 2 (two) times a day for 1 day 12 tablet 5    venlafaxine (EFFEXOR) 100 MG tablet Take 1 tablet (100 mg total) by mouth 3 (three) times a day 90 tablet 0     No current facility-administered medications for this visit.        Objective:      /84 (BP Location: Left arm, Patient Position: Sitting, Cuff Size: Large)   Pulse (!) 123   Temp 98 °F (36.7 °C) (Tympanic)   Resp 16   Ht 5' 4\" (1.626 m)   Wt 130 kg (286 lb)   SpO2 95%   BMI 49.09 kg/m²   BP Readings from Last 3 Encounters:   10/31/24 128/84   04/18/24 160/79   03/25/24 160/79     Wt Readings from Last 3 Encounters:   10/31/24 130 kg (286 lb)   04/18/24 129 kg (285 lb)   04/02/24 129 kg (285 lb 1.6 oz)       Review of Systems  ROS:  all others negative - no chest pain, SOB, normal urine and bowels. no GERD. sleeping well. mood improving anxiety - follows with psych.     Physical Exam   Constitutional: she appears well-developed and well-nourished.   HENT: Head: Normocephalic.   Right Ear: External ear normal. Tympanic membrane normal.   Left Ear: External ear normal. Tympanic membrane normal.   Nose: Nose normal. No mucosal edema, No rhinorrhea.   Right sinus exhibits no maxillary sinus tenderness.   Left sinus exhibits no maxillary sinus tenderness.   Mouth/Throat: Oropharynx is clear and moist.   Eyes: Normal conjunctiva.  No erythema. No discharge.  Neck: No pain on exam. Neck supple.   Cardiovascular: Normal rate, regular rhythm and normal heart sounds.    Pulmonary/Chest: Effort normal and breath sounds normal. No wheezes. No rales. No rhonchi.   Abdominal: Soft. Bowel sounds are normal. There is no tenderness.   Musculoskeletal: she exhibits no edema.   Lymphadenopathy: she has no cervical adenopathy.   Neurological: she  is alert and oriented to person, place, and time.   Skin: " Skin is warm and dry. No rashes. 3 cold sores.   Psychiatric: she  has a normal mood and affect. her behavior is normal. Thought content normal.   Vitals reviewed.          Depression Screening and Follow-up Plan: Patient's depression screening was positive with a PHQ-9 score of 8. Continue regular follow-up with their mental health provider who is managing their mental health condition(s). Follows with Dr. Lozoya.

## 2024-10-31 NOTE — ASSESSMENT & PLAN NOTE
Update labs  Cannot tolerate oral b12   Orders:    CBC and differential; Future    Comprehensive metabolic panel; Future    Ferritin; Future    Lipid panel; Future    TSH, 3rd generation; Future    Vitamin D 25 hydroxy; Future    Hemoglobin A1C; Future    Hepatic function panel; Future

## 2024-10-31 NOTE — PATIENT INSTRUCTIONS
Weight Loss Medications - please read carefully   Saxenda, Wegovy, [x] Zepbound are FDA approved for weight loss with a prior authorization. Call to see if these are approved by your insurance.     We have the additional options of Ozempic or Mounjaro if you have type 2 diabetes only.       [x] How to start the process: please read carefully   Call to see if one of the above medications are covered by your insurance.  Next call around to see if any pharmacies have the medication in stock. We cannot fill it until you know they have it in stock.   Next send us a [employer] message to let us know which one so we can send it in. The medication will need to be increased either weekly (for Saxenda) or monthly (for the others), so we cannot send it to a mail away pharmacy until you get to the maintenance dose.   Once  the prescription gets sent in we will need to get a prior auth from the insurance company. Allow 1 - 2 weeks for this process. If you have not hear anything regarding approval or denial from our office by this time, please reach out to us.  Once you start the medication you will need to contact our office every 2 - 3 weeks in order to get the next dose. Let us know your current dose and what pharmacy you want it sent to. You can call or send a [employer] message. Do not send a Medication Refill message, or have the pharmacy contact us,  or the last dose will be refilled automatically. Be sure to give enough time incase your insurance requires prior auth for every new dose (this can take up to 21 days).   We will need to see you about every 3 months to check on how this is working and to communicate with your insurance company that you are doing well. Please keep these scheduled appointments.       [x] Zepbound   Month 1:  2.5 mg subQ once weekly for 4 weeks  Month 2:  5 mg weekly x 4 weeks    Month 3:  7.5 mg weekly x 4 weeks   Month 4:  10 mg weekly x 4 weeks   Month 5: 12.5 mg weekly x 4 weeks   Month 6:  15 mg  weekly (max dose/maintenance dose)       How to give it:  Inject subQ into the thigh, abdomen, or upper arm; rotate injection sites 2,1.  Administer at any time of day, with or without meals 2,1.  Administer separately from insulin (do not mix the products); may inject both medications in the same body region but not adjacent to each other 2.  The day of the weekly administration can be changed as long as the time between the 2 doses is at least 3 days (72 hours) 2,1.  Administer a missed dose as soon as possible within 4 days (96 hours) of missed dose; if more than 4 days have passed, skip the missed dose and administer next dose on regularly scheduled day and resume regular once weekly dosing schedule    Call if:  You feel fullness or pain in the thyroid area (front and middle of your neck)    Bad upper abdominal pain that is not going away.   Nausea or vomiting that persists.  Any changes in vision 2.  You have worsening depression, suicidal ideation, or unusual changes in behavior 1.    Other precautions:  It is advised if you use an oral hormonal contraceptive to switch to a non-oral contraceptive method, or add a barrier method of contraception for 4 weeks after initiation and for 4 weeks after each dose escalation 2.  Side effects may include nausea, diarrhea, decreased appetite, vomiting, constipation, dyspepsia, and abdominal pain 2.  Take precautions to avoid dehydration 2.  If you have diabetes monitor for symptoms of hypoglycemia and report difficulties with glycemic control 1.  If you miss a dose administer a missed dose as soon as possible within 4 days. If more than 4 days have passed, skip the missed dose, administer the next dose on the regularly scheduled day, and resume the regular once-weekly dosing schedule 2.      Saxenda  This is how to start Saxenda. It is a daily injection to help with weight loss.     WEEK 1: 0.6mg daily  -if tolerated,  WEEK 2: 1.2mg daily  -if tolerated,  WEEK 3: 1.8mg  daily  -if tolerated,  WEEK 4: 2.4mg daily  -if tolerated,  WEEK 5: 3mg daily and then continue at this dose     If you miss more than 3 days of the medication you should restart this from the beginning.     VISIT [email or web address] to see about coupons, how to give yourself injections, and more information     If you develop nausea or vomiting, STOP the medication for a few days, then DECREASE to the previous dose that you tolerated well.   Stay well hydrated.  You can inject in your stomach, upper leg or upper arm   Unused pens should be stored in the refrigerator, but the pen in use can he kept at room temperature for up to 30 days   If you develop severe abdominal pain, pain radiating from your abdomen to the back, or fever associated with abdominal pain/vomiting, please call or go to the ER as this can be a sign of pancreatitis which can be an adverse effect of the medication.    Action: Cause slower gastric emptying which will increase feeling full with meals.  This feeling fullness will allow for you to reduce calorie intake sooner than usual and begin to lose some weight.  These medications also will reduce production of glucose form your liver to allow for better control of your sugars.   Reminders: These medications do not cause low blood sugars.   Keep in the refrigerator until you use it once, then keep it out of refrigerator.  You ONLY need to prime the pen upon open it monthly.  Clean your hands and site of injection to avoid infection risks. Store used needles in old plastic laundry detergent bin or coffee bin, then tape it when full and discard in garbage.   Side Effects:  Common side effects include full sensation with eating, nausea which usually improves over the first 1-2 weeks. Soreness, redness or lump at site of injection.  Dangers:  Pancreatitis can happen with this medicine for some people; therefore if you have ever had pancreatitis or have severe nausea vomiting and abdominal pain while on this  medicine stop it immediately and call us or go to ER for assistance. Do not use if you have had a history of thyroid cancer or a family history of MEN syndrome. If you are diabetic and use this medication with insulin or sulfonylureas there is a risk of low blood sugar. Monitor your sugars more closely.  If you are not able to urinate properly this could be a sign of a kidney problem. Let us know right away. In the clinical trials there were issues with gallbladder problems like gallstones. Let us know if you develop severe abdominal pain. If you have new depression or thoughts of suicide you should also contact us right away, and call 911 or go to your nearest emergency room.         Wegovy   Wegovy dosing:  Week 1 - 4:  0.25 mg weekly   Week 5 - 8:  0.5 mg weekly   Week 9 - 12:  1 mg weekly   Week 13 - 16:  1.7 mg weekly   Maintenance from there:  2.4 mg weekly.     Wegovy is a WEEKLY non-insulin injectable. GLP-1Agonist  Semaglutide (Wegovy) FDA approved in 2014 as adjunct to reduced calorie diet and increased physical activity for chronic weight management in adults with initial BMI of >= 30 kg/m2 or >= 27 kg/m2 with >= 1 weight-related comorbid condition. There were several studies run over 68 week periods of time.  During this time they diabetic patients lose on average 6.2% of their body weight. In a different trial involving non-diabetics the weight loss was closer to 12 - 15%.          Action: Cause slower gastric emptying which will increase feeling full with meals.  This feeling fullness will allow for you to reduce calorie intake sooner than usual and begin to lose some weight.  These medications also will reduce production of glucose form your liver to allow for better control of your sugars.       Reminders: These medications do not cause low blood sugars.   Keep in the refrigerator until you use it once, then keep it out of refrigerator.  Clean your hands and site of injection to avoid infection  risks. Store used needles in old plastic laundry detergent bin or coffee bin, then tape it when full and discard in garbage.      Side Effects:  Common side effects include full sensation with eating, nausea which usually improves over the first 1-2 weeks. Soreness, redness or lump at site of injection. Constipation, stomach pain, headache, fatigue, indigestion, dizziness, bloating, burping.      Dangers:  Pancreatitis can happen with this medicine for some people; therefore if you have ever had pancreatitis or have severe nausea vomiting and abdominal pain while on this medicine stop it immediately and call us or go to ER for assistance. Similarly gallbladder disease.  Do not use if you have had a history of thyroid canceror a family history of MEN syndrome.  It can cause low blood sugar if mixed with certain other diabetes medications.  If you have type 2 diabetes you should already be seeing an eye doctor - let them know you are on this medication and contact them if there are changes in your vision.  See the package insert for all serious possible side effects        Covered only if you have type 2 diabetes:    Ozempic  How to start Ozempic  Month 1:  0.25 mg weekly x 4 weeks   Month 2:  0.5 mg weekly x 4 weeks   Month 3:  1 mg weekly x 4 weeks   Month 4:  2 mg weekly thereafter     Go to the Ozempic or [email or web address] website to look coupons to save on this medication.   About the Ozempic® Pen  Ozempic® (semaglutide) injection 0.5 mg or 1 mg   Ozempic PI ([email or web address])     Ozempic is a  WEEKLY non-insulin injectable. GLP-1Agonist  Victoza (liraglutide) daily, Byetta (exenatide) twice daily, Adlyxin (lixisenatide)  Trulicity (dulaglutide) Bydureon(Exenatide) , Ozempic (semaglutide), Rybelsus (semaglutide),          Action: Cause slower gastric emptying which will increase feeling full with meals.  This feeling fullness will allow for you to reduce calorie intake sooner than usual and begin to lose some weight.  These  medications also will reduce production of glucose form your liver to allow for better control of your sugars.       Reminders: These medications do not cause low blood sugars.   Keep in the refrigerator until you use it once, then keep it out of refrigerator.  Clean your hands and site of injection to avoid infection risks. Store used needles in old plastic laundry detergent bin or coffee bin, then tape it when full and discard in garbage.      Side Effects:  Common side effects include full sensation with eating, nausea which usually improves over the first 1-2 weeks. Soreness, redness or lump at site of injection.     Dangers:  Pancreatitis can happen with this medicine for some people; therefore if you have ever had pancreatitis or have severe nausea vomiting and abdominal pain while on this medicine stop it immediately and call us or go to ER for assistance. Do not use if you have had a history of thyroid canceror a family history of MEN syndrome.     Mounjarno   Mounjaro (tirzepatide) dosing instructions  Month 1 -  2.5 mg weekly x 4 weeks   Month 2 - 5 mg weekly x 4 weeks  Month 3 - 7.5 mg weekly x 4 week   Month 4 - 10 mg weekly x 4 weeks  Month 5 - 12.5 mg weekly x 4 weeks   Week 6 and thereafter - 15 mg weekly     You can inject this in your stomach, thigh, or upper arm.   For savings card program go to [employer] or  4-739-JriymDa (1-607.534.7391)  Common side effects  nausea, diarrhea, decreased appetite, vomiting, constipation, indigestion, and stomach pain.   To help these try eating smaller meals, stop eating when you feel full, avoid eating fat or fatty foods, try eating bland foods like toast, crackers or rice.   If you take birth control pills by mouth these may not work as well while you are on this medication and you may want to increase your dose or use another form of birth control.     Contraindicated with medullary thyroid carcinoma MTC or MEN2.   It may cause tumors of the thyroid,  including thyroid cancer.   Call right away and stop medication if severe pain in stomach with or without vomiting (pancreatitis)   Watch for low blood sugar if taking this medication with a sulfanuria or insulin.   Drink plenty of fluids to avoid dehydration.   If you having gallbladder issues such as pain in your upper abdomen, yellowing of your skin, fever, eliseo colored stools, stop this medicine can all your doctor.   Call if you have sudden change in your vision.

## 2024-11-01 RX ORDER — CYANOCOBALAMIN 1000 UG/ML
INJECTION, SOLUTION INTRAMUSCULAR; SUBCUTANEOUS
Qty: 30 ML | Refills: 0 | OUTPATIENT
Start: 2024-11-01

## 2024-11-01 RX ORDER — ERGOCALCIFEROL 1.25 MG/1
50000 CAPSULE, LIQUID FILLED ORAL
Qty: 30 CAPSULE | Refills: 0 | OUTPATIENT
Start: 2024-11-01

## 2024-11-01 RX ORDER — FERROUS SULFATE 325(65) MG
325 TABLET ORAL
Qty: 90 TABLET | Refills: 0 | OUTPATIENT
Start: 2024-11-01

## 2024-11-02 NOTE — ASSESSMENT & PLAN NOTE
Depression Screening Follow-up Plan: Patient's depression screening was positive with a PHQ-9 score of 8. Continue regular follow-up with their psychologist/therapist/psychiatrist who is managing their mental health condition(s).

## 2024-11-30 ENCOUNTER — RESULTS FOLLOW-UP (OUTPATIENT)
Dept: FAMILY MEDICINE CLINIC | Facility: CLINIC | Age: 54
End: 2024-11-30

## 2024-11-30 ENCOUNTER — APPOINTMENT (OUTPATIENT)
Dept: LAB | Facility: CLINIC | Age: 54
End: 2024-11-30
Payer: COMMERCIAL

## 2024-11-30 DIAGNOSIS — E78.00 HYPERCHOLESTEREMIA: ICD-10-CM

## 2024-11-30 DIAGNOSIS — Z00.6 ENCOUNTER FOR EXAMINATION FOR NORMAL COMPARISON OR CONTROL IN CLINICAL RESEARCH PROGRAM: ICD-10-CM

## 2024-11-30 DIAGNOSIS — E66.01 CLASS 3 SEVERE OBESITY DUE TO EXCESS CALORIES WITH SERIOUS COMORBIDITY AND BODY MASS INDEX (BMI) OF 45.0 TO 49.9 IN ADULT (HCC): ICD-10-CM

## 2024-11-30 DIAGNOSIS — R74.8 ELEVATED ALKALINE PHOSPHATASE LEVEL: ICD-10-CM

## 2024-11-30 DIAGNOSIS — D50.8 OTHER IRON DEFICIENCY ANEMIA: ICD-10-CM

## 2024-11-30 DIAGNOSIS — E53.8 B12 DEFICIENCY: Primary | ICD-10-CM

## 2024-11-30 DIAGNOSIS — E55.9 VITAMIN D DEFICIENCY: ICD-10-CM

## 2024-11-30 DIAGNOSIS — E53.8 VITAMIN B12 DEFICIENCY: ICD-10-CM

## 2024-11-30 DIAGNOSIS — E66.813 CLASS 3 SEVERE OBESITY DUE TO EXCESS CALORIES WITH SERIOUS COMORBIDITY AND BODY MASS INDEX (BMI) OF 45.0 TO 49.9 IN ADULT (HCC): ICD-10-CM

## 2024-11-30 DIAGNOSIS — Z98.84 S/P GASTRIC BYPASS: ICD-10-CM

## 2024-11-30 DIAGNOSIS — R79.0 LOW FERRITIN: ICD-10-CM

## 2024-11-30 LAB
25(OH)D3 SERPL-MCNC: 8.8 NG/ML (ref 30–100)
ALBUMIN SERPL BCG-MCNC: 3.8 G/DL (ref 3.5–5)
ALP SERPL-CCNC: 149 U/L (ref 34–104)
ALT SERPL W P-5'-P-CCNC: 12 U/L (ref 7–52)
ANION GAP SERPL CALCULATED.3IONS-SCNC: 6 MMOL/L (ref 4–13)
AST SERPL W P-5'-P-CCNC: 14 U/L (ref 13–39)
BASOPHILS # BLD AUTO: 0.05 THOUSANDS/ΜL (ref 0–0.1)
BASOPHILS NFR BLD AUTO: 1 % (ref 0–1)
BILIRUB DIRECT SERPL-MCNC: 0.02 MG/DL (ref 0–0.2)
BILIRUB SERPL-MCNC: 0.26 MG/DL (ref 0.2–1)
BUN SERPL-MCNC: 9 MG/DL (ref 5–25)
CALCIUM SERPL-MCNC: 9.1 MG/DL (ref 8.4–10.2)
CHLORIDE SERPL-SCNC: 107 MMOL/L (ref 96–108)
CHOLEST SERPL-MCNC: 224 MG/DL (ref ?–200)
CO2 SERPL-SCNC: 31 MMOL/L (ref 21–32)
CREAT SERPL-MCNC: 0.76 MG/DL (ref 0.6–1.3)
EOSINOPHIL # BLD AUTO: 0.12 THOUSAND/ΜL (ref 0–0.61)
EOSINOPHIL NFR BLD AUTO: 3 % (ref 0–6)
ERYTHROCYTE [DISTWIDTH] IN BLOOD BY AUTOMATED COUNT: 15.1 % (ref 11.6–15.1)
EST. AVERAGE GLUCOSE BLD GHB EST-MCNC: 114 MG/DL
FERRITIN SERPL-MCNC: 13 NG/ML (ref 11–307)
GFR SERPL CREATININE-BSD FRML MDRD: 90 ML/MIN/1.73SQ M
GLUCOSE P FAST SERPL-MCNC: 84 MG/DL (ref 65–99)
HBA1C MFR BLD: 5.6 %
HCT VFR BLD AUTO: 40.1 % (ref 34.8–46.1)
HDLC SERPL-MCNC: 53 MG/DL
HGB BLD-MCNC: 12.2 G/DL (ref 11.5–15.4)
IMM GRANULOCYTES # BLD AUTO: 0.01 THOUSAND/UL (ref 0–0.2)
IMM GRANULOCYTES NFR BLD AUTO: 0 % (ref 0–2)
LDLC SERPL CALC-MCNC: 151 MG/DL (ref 0–100)
LYMPHOCYTES # BLD AUTO: 1.84 THOUSANDS/ΜL (ref 0.6–4.47)
LYMPHOCYTES NFR BLD AUTO: 38 % (ref 14–44)
MCH RBC QN AUTO: 26.3 PG (ref 26.8–34.3)
MCHC RBC AUTO-ENTMCNC: 30.4 G/DL (ref 31.4–37.4)
MCV RBC AUTO: 86 FL (ref 82–98)
MONOCYTES # BLD AUTO: 0.42 THOUSAND/ΜL (ref 0.17–1.22)
MONOCYTES NFR BLD AUTO: 9 % (ref 4–12)
NEUTROPHILS # BLD AUTO: 2.43 THOUSANDS/ΜL (ref 1.85–7.62)
NEUTS SEG NFR BLD AUTO: 49 % (ref 43–75)
NONHDLC SERPL-MCNC: 171 MG/DL
NRBC BLD AUTO-RTO: 0 /100 WBCS
PLATELET # BLD AUTO: 299 THOUSANDS/UL (ref 149–390)
PMV BLD AUTO: 10.6 FL (ref 8.9–12.7)
POTASSIUM SERPL-SCNC: 3.8 MMOL/L (ref 3.5–5.3)
PROT SERPL-MCNC: 7.3 G/DL (ref 6.4–8.4)
RBC # BLD AUTO: 4.64 MILLION/UL (ref 3.81–5.12)
SODIUM SERPL-SCNC: 144 MMOL/L (ref 135–147)
TRIGL SERPL-MCNC: 99 MG/DL (ref ?–150)
TSH SERPL DL<=0.05 MIU/L-ACNC: 3.19 UIU/ML (ref 0.45–4.5)
WBC # BLD AUTO: 4.87 THOUSAND/UL (ref 4.31–10.16)

## 2024-11-30 PROCEDURE — 82306 VITAMIN D 25 HYDROXY: CPT

## 2024-11-30 PROCEDURE — 85025 COMPLETE CBC W/AUTO DIFF WBC: CPT

## 2024-11-30 PROCEDURE — 82728 ASSAY OF FERRITIN: CPT

## 2024-11-30 PROCEDURE — 80053 COMPREHEN METABOLIC PANEL: CPT

## 2024-11-30 PROCEDURE — 82248 BILIRUBIN DIRECT: CPT

## 2024-11-30 PROCEDURE — 83036 HEMOGLOBIN GLYCOSYLATED A1C: CPT

## 2024-11-30 PROCEDURE — 80061 LIPID PANEL: CPT

## 2024-11-30 PROCEDURE — 84443 ASSAY THYROID STIM HORMONE: CPT

## 2024-11-30 PROCEDURE — 36415 COLL VENOUS BLD VENIPUNCTURE: CPT

## 2024-12-03 NOTE — TELEPHONE ENCOUNTER
----- Message from Anushka Arora, DO sent at 11/30/2024  8:31 PM EST -----  Please see if the lab can add on B12 (ordered)

## 2024-12-03 NOTE — TELEPHONE ENCOUNTER
Informed patient that results will be reviewed at upcoming appointment. Pt verbalized understanding.

## 2024-12-10 ENCOUNTER — TELEMEDICINE (OUTPATIENT)
Dept: FAMILY MEDICINE CLINIC | Facility: CLINIC | Age: 54
End: 2024-12-10
Payer: COMMERCIAL

## 2024-12-10 VITALS — BODY MASS INDEX: 48.92 KG/M2 | WEIGHT: 285 LBS

## 2024-12-10 DIAGNOSIS — E78.00 HYPERCHOLESTEREMIA: ICD-10-CM

## 2024-12-10 DIAGNOSIS — E66.813 CLASS 3 SEVERE OBESITY DUE TO EXCESS CALORIES WITH SERIOUS COMORBIDITY AND BODY MASS INDEX (BMI) OF 45.0 TO 49.9 IN ADULT (HCC): ICD-10-CM

## 2024-12-10 DIAGNOSIS — R79.0 LOW FERRITIN: ICD-10-CM

## 2024-12-10 DIAGNOSIS — E66.01 CLASS 3 SEVERE OBESITY DUE TO EXCESS CALORIES WITH SERIOUS COMORBIDITY AND BODY MASS INDEX (BMI) OF 45.0 TO 49.9 IN ADULT (HCC): ICD-10-CM

## 2024-12-10 DIAGNOSIS — I77.810 AORTIC ECTASIA, THORACIC (HCC): ICD-10-CM

## 2024-12-10 DIAGNOSIS — E53.8 VITAMIN B12 DEFICIENCY: ICD-10-CM

## 2024-12-10 DIAGNOSIS — E55.9 VITAMIN D DEFICIENCY: ICD-10-CM

## 2024-12-10 DIAGNOSIS — Z98.84 S/P GASTRIC BYPASS: Primary | ICD-10-CM

## 2024-12-10 DIAGNOSIS — Z98.84 S/P GASTRIC BYPASS: ICD-10-CM

## 2024-12-10 DIAGNOSIS — D50.8 OTHER IRON DEFICIENCY ANEMIA: ICD-10-CM

## 2024-12-10 DIAGNOSIS — R73.03 PREDIABETES: ICD-10-CM

## 2024-12-10 PROCEDURE — 99214 OFFICE O/P EST MOD 30 MIN: CPT | Performed by: FAMILY MEDICINE

## 2024-12-10 RX ORDER — CLONAZEPAM 1 MG/1
TABLET ORAL
COMMUNITY
Start: 2024-12-01

## 2024-12-10 RX ORDER — QUETIAPINE FUMARATE 50 MG/1
TABLET, FILM COATED ORAL
COMMUNITY

## 2024-12-10 RX ORDER — METHYLDOPA 500 MG
1 TABLET ORAL DAILY
Qty: 90 TABLET | Refills: 2 | Status: SHIPPED | OUTPATIENT
Start: 2024-12-10 | End: 2024-12-12 | Stop reason: SDUPTHER

## 2024-12-10 RX ORDER — TIRZEPATIDE 2.5 MG/.5ML
2.5 INJECTION, SOLUTION SUBCUTANEOUS WEEKLY
Qty: 2 ML | Refills: 0 | Status: SHIPPED | OUTPATIENT
Start: 2024-12-10 | End: 2024-12-11 | Stop reason: SDUPTHER

## 2024-12-10 NOTE — PROGRESS NOTES
Virtual Regular Visit    Assessment/Plan:     Assessment & Plan  S/P gastric bypass  Start vitamin D 5000 daily   Start oral iron - declines infusions  Cannot tolerate b12 - does not want to check this     Orders:  •  Ferrous Sulfate Dried ER (Slow Release Iron) 160 (50 Fe) MG TBCR; Take 1 tablet by mouth in the morning  •  Cholecalciferol (Vitamin D3) 125 MCG (5000 UT) CAPS; Take 1 capsule (5,000 Units total) by mouth daily    Vitamin B12 deficiency  Start vitamin D 5000 daily   Start oral iron - declines infusions  Cannot tolerate b12 - does not want to check this        Prediabetes  Well controlled        Low ferritin  Start vitamin D 5000 daily   Start oral iron - declines infusions  Cannot tolerate b12 - does not want to check this   Orders:  •  Ferrous Sulfate Dried ER (Slow Release Iron) 160 (50 Fe) MG TBCR; Take 1 tablet by mouth in the morning    Class 3 severe obesity due to excess calories with serious comorbidity and body mass index (BMI) of 45.0 to 49.9 in adult (HCC)      Recommend Zepbound (could also consider Wegovy and Saxenda)   Pt has not had success with weight loss with healthy diet and exercise alone, but will continue to work on these.     Patient is to call her insurance to see if this is covered, and then call pharmacies to find the medication.  When she does this we can submit the medication for prior authorization.  Follow up 3 months after starting GLP.    Patient has not been on any weight loss medicines in the past 12 months  This is a new start - she is not on another GLP medication currently or in the past 12 months   No personal or family history of thyroid cancer  No personal history of pancreatitis  A GLP medication is recommended for improvement in weight and comorbidities associated with obesity  - see problem list.  Risks and benefits and side effects of medication reviewed  Body mass index is 48.92 kg/m².  Wt Readings from Last 1 Encounters:   12/10/24 129 kg (285 lb)        Orders:  •  tirzepatide (Zepbound) 2.5 mg/0.5 mL auto-injector; Inject 0.5 mL (2.5 mg total) under the skin once a week    Vitamin D deficiency    Orders:  •  Cholecalciferol (Vitamin D3) 125 MCG (5000 UT) CAPS; Take 1 capsule (5,000 Units total) by mouth daily    Other iron deficiency anemia  Start vitamin D 5000 daily   Start oral iron - declines infusions  Cannot tolerate b12 - does not want to check this        Hypercholesteremia  Ascvd risk < 2%       Aortic ectasia, thoracic (HCC)  Update echo and CT scan next year   Orders:  •  CT chest w contrast; Future  •  Echo complete w/ contrast if indicated; Future         Return in about 4 months (around 4/10/2025) for weight loss follow up .    Reason for visit is   Chief Complaint   Patient presents with   • Follow-up     Review labs.      Encounter provider Anushka Arora DO  Provider located at [facility]  [address]  [facility] [address] [phone]    Recent Visits  No visits were found meeting these conditions.  Showing recent visits within past 7 days and meeting all other requirements  Today's Visits  Date Type Provider Dept   12/10/24 Telemedicine Anushka Arora DO [facility]   Showing today's visits and meeting all other requirements  Future Appointments  No visits were found meeting these conditions.  Showing future appointments within next 150 days and meeting all other requirements       The patient was identified by name and date of birth. Leticia Pedroza was informed that this is a telemedicine visit and that the visit is being conducted through the Epic Embedded platform. She agrees to proceed..  My office door was closed. No one else was in the room.  She acknowledged consent and understanding of privacy and security of the video platform. The patient has agreed to participate and understands they can discontinue the visit at any time.    Patient is currently located in the [facility]  of [place]  Patient is currently located in a state in which I am licensed    Patient is aware this is a billable service.     Subjective  Leticia Pedroza is a 54 y.o. female is being seen via Video Visit today due to the COVID-19 pandemic.  Chief Complaint   Patient presents with   • Follow-up     Review labs.        Today's concerns are:    Not taking iron, cannot tolerate   Takes flinestones chewable with extra iron   Cannot tolerate b12   Seeing Dr. Lozoya   Wants weight loss meds  A lot of joint pain - saw Arthritis doctor     Vitals:    12/10/24 1705   Weight: 129 kg (285 lb)     Wt Readings from Last 3 Encounters:   12/10/24 129 kg (285 lb)   10/31/24 130 kg (286 lb)   24 129 kg (285 lb)     BP Readings from Last 3 Encounters:   10/31/24 128/84   24 160/79   24 160/79       PHQ-2/9 Depression Screening            Past Medical History:   Diagnosis Date   • Allergic    • Anemia    • Anxiety    • Arthritis    • Asthma     childhood   • Depression    • GERD (gastroesophageal reflux disease)    • Headache(784.0)    • Hypertension     They found my aorta is extended   • Kidney stone     I had them once was treated at [facility] may years ago   • Narcolepsy    • Obesity    • Positive colorectal cancer screening using Cologuard test 2022   • Urinary tract infection     Had one about 2 years ago   • Visual impairment     Wear glasses when needed     Past Surgical History:   Procedure Laterality Date   •  SECTION     • CHOLECYSTECTOMY     • COLON SURGERY     • COLONOSCOPY  10/10/2022   • KNEE ARTHROSCOPY Left    • MELISSA-EN-Y PROCEDURE     • TUBAL LIGATION         Current Medications:  Current Outpatient Medications   Medication Sig Dispense Refill   • albuterol (Ventolin HFA) 90 mcg/act inhaler Inhale 2 puffs every 4 (four) hours as needed for wheezing 18 g 0   • ALPRAZolam (XANAX) 1 mg tablet Take 1 mg by mouth if needed     • cetirizine (ZyrTEC Allergy) 10 mg tablet       • Cholecalciferol (Vitamin D3) 125 MCG (5000 UT) CAPS Take 1 capsule (5,000 Units total) by mouth daily 90 capsule 2   • clonazePAM (KlonoPIN) 1 mg tablet MAY TAKE WITH 2 MG TABLET BY MOUTH TWICE DAILY AS NEEDED     • clonazePAM (KlonoPIN) 2 mg tablet TAKE 1/2 TABLET BY MOUTH FOUR TIMES DAILY. MAY USE WITH ALPRAZOLAM     • Diclofenac Sodium (VOLTAREN) 1 % Apply 2 g topically 4 (four) times a day 150 g 1   • Ferrous Sulfate Dried ER (Slow Release Iron) 160 (50 Fe) MG TBCR Take 1 tablet by mouth in the morning 90 tablet 2   • fluticasone (Flovent HFA) 220 mcg/act inhaler Inhale 2 puffs 2 (two) times a day Rinse mouth after use. 12 g 0   • levalbuterol (Xopenex) 1.25 mg/3 mL nebulizer solution Take 3 mL (1.25 mg total) by nebulization every 6 (six) hours as needed for wheezing or shortness of breath 75 mL 0   • QUEtiapine (SEROquel) 100 mg tablet Take 100 mg by mouth     • QUEtiapine (SEROquel) 50 mg tablet      • Sodium Oxybate (Xyrem) 500 MG/ML SOLN First dose (bedtime): 4.5 g + Second dose (2.5-4 hrs later): 4.5 g = 9 g Total Nightly Dose [Total Qty: 1 month(s)] 540 mL 4   • tirzepatide (Zepbound) 2.5 mg/0.5 mL auto-injector Inject 0.5 mL (2.5 mg total) under the skin once a week 2 mL 0   • venlafaxine (EFFEXOR) 100 MG tablet Take 1 tablet (100 mg total) by mouth 3 (three) times a day 90 tablet 0   • valACYclovir (VALTREX) 1,000 mg tablet Take 2 tablets (2,000 mg total) by mouth 2 (two) times a day for 1 day (Patient not taking: Reported on 12/10/2024) 12 tablet 5     No current facility-administered medications for this visit.        Allergies:  Allergies   Allergen Reactions   • Codeine GI Intolerance     heartburn   • Morphine Itching and Rash       Review of Systems  all others negative - no chest pain, SOB, normal urine and bowels. no GERD. sleeping well. mood good.     Physical Exam   Video Exam Pt not examined in person - seen over epic virtual video visit   Constitutional:  she appears well-developed  and well-nourished.   HENT: Head: Normocephalic.   Right Ear: External ear normal.   Left Ear: External ear normal.   Nose: Nose normal.   Eyes: Pupils are equal, round, and reactive to light. Right eye exhibits no discharge. Left eye exhibits no discharge. No scleral icterus.   Neck: Normal range of motion.   Pulmonary/Chest: Effort normal. No respiratory distress.   Neurological: she is alert and oriented to person, place, and time.   Skin: Skin is warm and dry on face - no rashes. Not pale. Not diaphoretic.   Psychiatric: she  has a normal mood and affect. she behavior is normal. Thought content normal.       As a result of this visit, I have not referred the patient for further respiratory evaluation.    VIRTUAL VISIT DISCLAIMER    Leticia ROXI Pedroza acknowledges that she has consented to an online visit or consultation. She understands that the online visit is based solely on information provided by her, and that, in the absence of a face-to-face physical evaluation by the physician, the diagnosis she receives is both limited and provisional in terms of accuracy and completeness. This is not intended to replace a full medical face-to-face evaluation by the physician. Leticia Pedroza understands and accepts these terms.

## 2024-12-10 NOTE — ASSESSMENT & PLAN NOTE
Start vitamin D 5000 daily   Start oral iron - declines infusions  Cannot tolerate b12 - does not want to check this

## 2024-12-10 NOTE — PATIENT INSTRUCTIONS
Start iron daily   Start vitamin D 5000 iu daily   Call if you are having side effects     Zepbound   Obesity, Or overweight with at least 1 weight-related complication  Initial, 2.5 mg subQ once weekly for 4 weeks, then increase to 5 mg subQ once weekly; may increase dosage in 2.5-mg increments (once weekly) after at least 4 weeks on the current dose to a maintenance dosage of 5 mg, 10 mg, or 15 mg once weekly; MAX, 15 mg subQ once weekly 1    Type 2 diabetes mellitus  Initial, 2.5 mg subQ once weekly for 4 weeks, then increase to 5 mg subQ once weekly; if additional glycemic control is needed, increase dosage in 2.5-mg increments (once weekly) after at least 4 weeks on the current dose; MAX, 15 mg subQ once weekly 2    Zepbound   Dosing schedule:  Month 1:  2.5 mg subQ once weekly for 4 weeks  Month 2:  5 mg weekly x 4 weeks    Month 3:  7.5 mg weekly x 4 weeks   Month 4:  10 mg weekly x 4 weeks   Month 5: 12.5 mg weekly x 4 weeks   Month 6:  15 mg weekly (max dose/maintenance dose)       How to give it:  Inject subQ into the thigh, abdomen, or upper arm; rotate injection sites 2,1.  Administer at any time of day, with or without meals 2,1.  Administer separately from insulin (do not mix the products); may inject both medications in the same body region but not adjacent to each other 2.  The day of the weekly administration can be changed as long as the time between the 2 doses is at least 3 days (72 hours) 2,1.  Administer a missed dose as soon as possible within 4 days (96 hours) of missed dose; if more than 4 days have passed, skip the missed dose and administer next dose on regularly scheduled day and resume regular once weekly dosing schedule    Call if:  You feel fullness or pain in the thyroid area (front and middle of your neck)    Bad upper abdominal pain that is not going away.   Nausea or vomiting that persists.  Any changes in vision 2.  You have worsening depression, suicidal ideation, or unusual  changes in behavior 1.    Other precautions:  It is advised if you use an oral hormonal contraceptive to switch to a non-oral contraceptive method, or add a barrier method of contraception for 4 weeks after initiation and for 4 weeks after each dose escalation 2.  Side effects may include nausea, diarrhea, decreased appetite, vomiting, constipation, dyspepsia, and abdominal pain 2.  Take precautions to avoid dehydration 2.  If you have diabetes monitor for symptoms of hypoglycemia and report difficulties with glycemic control 1.  If you miss a dose administer a missed dose as soon as possible within 4 days. If more than 4 days have passed, skip the missed dose, administer the next dose on the regularly scheduled day, and resume the regular once-weekly dosing schedule 2.    Zepbound Tirzepatide (By injection)    Treats type 2 diabetes. Used to help lose weight and keep the weight off in patients with obesity caused by certain conditions.  When This Medicine Should Not Be Used:  This medicine is not right for everyone. Do not use it if you had an allergic reaction to tirzepatide, or if you have multiple endocrine neoplasia syndrome type 2 (MEN 2) or if you or anyone in your family has had medullary thyroid cancer.    How to Use This Medicine:  Injectable  Your doctor will prescribe your exact dose and tell you how often it should be given. This medicine is given as a shot under your skin. It is given into your stomach, thigh, or upper arm.  You may be taught how to give your medicine at home. Make sure you understand all instructions before giving yourself an injection. Do not use more medicine or use it more often than your doctor tells you to.  Check the liquid in the pen. It should be clear and colorless. Do not use it if it is cloudy, discolored, or has particles in it.  You will be shown the body areas where this shot can be given. Use a different body area each time you give yourself a shot. Keep track of where  you give each shot to make sure you rotate body areas.  Use a new needle and syringe each time you inject your medicine.  Never share medicine pens with others under any circumstances. Sharing needles or pens can result in transmission of infection.  This medicine should come with a Medication Guide. Ask your pharmacist for a copy if you do not have one.    Missed dose: If you miss a dose of this medicine, use it as soon as possible within 4 days after the missed dose. If you miss a dose for more than 4 days, skip the missed dose and go back to your regular dosing schedule. Do not use extra medicine to make up for a missed dose.  Store your medicine pen in its original carton in the refrigerator. Do not freeze. Do not use this medicine if frozen. You may store the pen at room temperature for up to 21 days. Do not put this medicine back in the refrigerator if it has been stored at room temperature. Throw away unused pen within 21 days after removing from the refrigerator.    Drugs and Foods to Avoid:  Some medicines may affect how tirzepatide works. Tell your doctor if you are using other diabetes medicine (including glimepiride, glipizide, glyburide, or insulin), birth control pills, a blood thinner (including warfarin), or any oral medicine.    Warnings While Using This Medicine:  Zepbound™: It is not safe to take this medicine during pregnancy. It could harm an unborn baby. Tell your doctor right away if you become pregnant. If you are using birth control pills, your doctor may recommend another type of birth control for 4 weeks after you start using this medicine and after each increase in your dose.  Tell your doctor if you are pregnant or planning to become pregnant.    Tell your doctor if you are breastfeeding, or if you have kidney disease, pancreas problems, diabetes, digestion problems (including gastroparesis), gallbladder disease, or a history of diabetic retinopathy or depression.    This medicine may  cause the following problems:  Increased risk of thyroid tumor  Pancreatitis (swelling of the pancreas)  Low blood sugar (when used with other diabetes medicine)  Kidney problems  Eye or vision problems, including diabetic retinopathy  Gallbladder problems, including cholelithiasis, cholecystitis  Increased risk for depression or thoughts of suicide    If you are using birth control pills, you should use another type of birth control for 4 weeks after you start using this medicine and after each increase in your dose.      Possible Side Effects While Using This Medicine:  Call your doctor right away if you notice any of these side effects:  Allergic reaction: Itching or hives, swelling in your face or hands, swelling or tingling in your mouth or throat, chest tightness, trouble breathing  Blurred vision or any other change in vision  Change in how much or how often you urinate, lower back or side pain, blood in your urine  Changes in mood, behavior, or thoughts  Shaking, trembling, sweating, fast or pounding heartbeat, lightheadedness, hunger, confusion  Sudden and severe stomach pain, nausea, vomiting, fever, passing gas, stomach upset or bloating, yellow eyes or skin

## 2024-12-10 NOTE — ASSESSMENT & PLAN NOTE
Start vitamin D 5000 daily   Start oral iron - declines infusions  Cannot tolerate b12 - does not want to check this     Orders:  •  Ferrous Sulfate Dried ER (Slow Release Iron) 160 (50 Fe) MG TBCR; Take 1 tablet by mouth in the morning  •  Cholecalciferol (Vitamin D3) 125 MCG (5000 UT) CAPS; Take 1 capsule (5,000 Units total) by mouth daily

## 2024-12-10 NOTE — ASSESSMENT & PLAN NOTE
Recommend Zepbound (could also consider Wegovy and Saxenda)   Pt has not had success with weight loss with healthy diet and exercise alone, but will continue to work on these.     Patient is to call her insurance to see if this is covered, and then call pharmacies to find the medication.  When she does this we can submit the medication for prior authorization.  Follow up 3 months after starting GLP.    Patient has not been on any weight loss medicines in the past 12 months  This is a new start - she is not on another GLP medication currently or in the past 12 months   No personal or family history of thyroid cancer  No personal history of pancreatitis  A GLP medication is recommended for improvement in weight and comorbidities associated with obesity  - see problem list.  Risks and benefits and side effects of medication reviewed  Body mass index is 48.92 kg/m².  Wt Readings from Last 1 Encounters:   12/10/24 129 kg (285 lb)       Orders:  •  tirzepatide (Zepbound) 2.5 mg/0.5 mL auto-injector; Inject 0.5 mL (2.5 mg total) under the skin once a week

## 2024-12-10 NOTE — ASSESSMENT & PLAN NOTE
Start vitamin D 5000 daily   Start oral iron - declines infusions  Cannot tolerate b12 - does not want to check this   Orders:  •  Ferrous Sulfate Dried ER (Slow Release Iron) 160 (50 Fe) MG TBCR; Take 1 tablet by mouth in the morning

## 2024-12-10 NOTE — ASSESSMENT & PLAN NOTE
Orders:  •  Cholecalciferol (Vitamin D3) 125 MCG (5000 UT) CAPS; Take 1 capsule (5,000 Units total) by mouth daily

## 2024-12-10 NOTE — ASSESSMENT & PLAN NOTE
Update echo and CT scan next year   Orders:  •  CT chest w contrast; Future  •  Echo complete w/ contrast if indicated; Future

## 2024-12-11 RX ORDER — TIRZEPATIDE 2.5 MG/.5ML
2.5 INJECTION, SOLUTION SUBCUTANEOUS WEEKLY
Qty: 2 ML | Refills: 0 | Status: SHIPPED | OUTPATIENT
Start: 2024-12-11

## 2024-12-11 NOTE — TELEPHONE ENCOUNTER
PA for ZEPBOUND 2.5MG SUBMITTED to [facility]    via      [x][employer]-Case ID #       [x]PA sent as URGENT    All office notes, labs and other pertaining documents and studies sent. Clinical questions answered. Awaiting determination from insurance company.     Turnaround time for your insurance to make a decision on your Prior Authorization can take 7-21 business days.

## 2024-12-11 NOTE — TELEPHONE ENCOUNTER
PA for ZEPBOUND 2.5MG- APPROVED     Date(s) approved  November 10, 2024 to August 8, 2025     SPOKE WITH PATIENT ADVISED THAT MEDICATION WAS SENT TO A MAIL ORDER PHARMACY - PATIENT STATED THEY WANT IT AT [facility] IN [place]. WILL SEND A MESSAGE TO OFFICE TO RESEND RX TO PROPER PHARMACY    Patient advised by          []Kathleen Message  [x]Phone call   []LMOM  []L/M to call office as no active Communication consent on file  []Unable to leave detailed message as VM not approved on Communication consent         Approval letter scanned into Media No WAITING FOR FAX

## 2024-12-11 NOTE — TELEPHONE ENCOUNTER
PLEASE ADVISE- PATIENT WOULD LIKE PRESCRIPTION SENT TO:    [id number] - [place], [place] - [address]  Phone: [phone]   Fax: [phone]        IT WAS SENT TO WRONG PHARMACY PLEASE REDIRECT.

## 2024-12-12 RX ORDER — METHYLDOPA 500 MG
1 TABLET ORAL DAILY
Qty: 90 TABLET | Refills: 2 | Status: SHIPPED | OUTPATIENT
Start: 2024-12-12

## 2024-12-13 LAB
APOB+LDLR+PCSK9 GENE MUT ANL BLD/T: NOT DETECTED
BRCA1+BRCA2 DEL+DUP + FULL MUT ANL BLD/T: NOT DETECTED
MLH1+MSH2+MSH6+PMS2 GN DEL+DUP+FUL M: NOT DETECTED

## 2024-12-15 ENCOUNTER — PATIENT MESSAGE (OUTPATIENT)
Dept: FAMILY MEDICINE CLINIC | Facility: CLINIC | Age: 54
End: 2024-12-15

## 2024-12-16 NOTE — PATIENT COMMUNICATION
Please clarify that message - the patient's name is Yovany, not the doctor she saw.   No, I cannot order tests for them   Yes her visit can be virtual. And I only want the labs I ordered.

## 2024-12-16 NOTE — PATIENT COMMUNICATION
Patient has a F/U appointment scheduled with you on 4/15/2024 for weight management F/U. Is it okay for her to keep her appointment virtual or would you like to see her in person? Also, she is wondering if you would like her to complete any additional labs prior to the appointment. She currently has a Vit B12 to be completed.

## 2024-12-18 NOTE — PATIENT COMMUNICATION
Reached out to patient and clarified which provider that she had seen. Leticia stated that she saw Eliza Waters DO at [facility] [phone]. I will send patient the office's information because patient will contact them and get an appointment scheduled with them and request previous orders to be placed again so that she is able to complete them prior to her appointment with them. I informed patient that she is able to have her appointment be virtual and that Dr. Arora would like her to complete the labs that are already placed for her. Patient verbalized understanding.

## 2024-12-30 ENCOUNTER — PATIENT MESSAGE (OUTPATIENT)
Dept: FAMILY MEDICINE CLINIC | Facility: CLINIC | Age: 54
End: 2024-12-30

## 2025-01-06 DIAGNOSIS — E66.813 CLASS 3 SEVERE OBESITY DUE TO EXCESS CALORIES WITH SERIOUS COMORBIDITY AND BODY MASS INDEX (BMI) OF 45.0 TO 49.9 IN ADULT (HCC): ICD-10-CM

## 2025-01-06 DIAGNOSIS — E66.01 CLASS 3 SEVERE OBESITY DUE TO EXCESS CALORIES WITH SERIOUS COMORBIDITY AND BODY MASS INDEX (BMI) OF 45.0 TO 49.9 IN ADULT (HCC): ICD-10-CM

## 2025-01-06 RX ORDER — TIRZEPATIDE 2.5 MG/.5ML
2.5 INJECTION, SOLUTION SUBCUTANEOUS WEEKLY
Qty: 2 ML | Refills: 0 | Status: CANCELLED | OUTPATIENT
Start: 2025-01-06

## 2025-01-07 ENCOUNTER — PATIENT MESSAGE (OUTPATIENT)
Dept: FAMILY MEDICINE CLINIC | Facility: CLINIC | Age: 55
End: 2025-01-07

## 2025-01-07 ENCOUNTER — HOSPITAL ENCOUNTER (OUTPATIENT)
Dept: RADIOLOGY | Age: 55
Discharge: HOME | End: 2025-01-07
Payer: COMMERCIAL

## 2025-01-07 DIAGNOSIS — Z12.31 ENCOUNTER FOR SCREENING MAMMOGRAM FOR BREAST CANCER: ICD-10-CM

## 2025-01-07 PROCEDURE — 77067 SCR MAMMO BI INCL CAD: CPT

## 2025-01-07 PROCEDURE — 77063 BREAST TOMOSYNTHESIS BI: CPT

## 2025-01-09 RX ORDER — TIRZEPATIDE 5 MG/.5ML
5 INJECTION, SOLUTION SUBCUTANEOUS WEEKLY
Qty: 2 ML | Refills: 0 | Status: SHIPPED | OUTPATIENT
Start: 2025-01-09

## 2025-01-10 ENCOUNTER — TELEPHONE (OUTPATIENT)
Age: 55
End: 2025-01-10

## 2025-01-10 NOTE — TELEPHONE ENCOUNTER
PA for ZEPBOUND 5MG SUBMITTED to [facility]    via    [x]CMM-KEY: [id number]      [x]PA sent as URGENT    All office notes, labs and other pertaining documents and studies sent. Clinical questions answered. Awaiting determination from insurance company.     Turnaround time for your insurance to make a decision on your Prior Authorization can take 7-21 business days.

## 2025-01-16 NOTE — TELEPHONE ENCOUNTER
PA for ZEPBOUND 2.5MG- DENIED    PLAN EXCLUSION    Reason:(Screenshot if applicable)        Message sent to office clinical pool Yes    Denial letter scanned into Media No WAITING FOR FAX    Appeal started No (Provider will need to decide if appeal is warranted and send clinical documentation to Prior Authorization Team for initiation.)    **Please follow up with your patient regarding denial and next steps**

## 2025-01-16 NOTE — TELEPHONE ENCOUNTER
5 mg dose sent in  Due to prior auth process they need to request refills 2 weeks in advance and let us know if it is helping, are there side effects and do they want to increase the dose of stay the same.   
Did speak to patient. Yes, she is fine to increase her dose. Said the only things that she thinks is a side effect is a dry mouth. Could you let her know if that is a side effect and if you think she should try a different weight loss medication. Please advise  
I already sent her medication  There is another message regarding dry mouth - see this   
PCP sent [employer] message.   
Patients spouse called and stated injection is due tomorrow.Patient went to pharmacy prescription was not available.Spouse is requesting a return call when medication is sent.  
Refill request for zepbound    Is pt tolerating well?  If so- will increase dose to 5 mg weekly.  
Quality 226: Preventive Care And Screening: Tobacco Use: Screening And Cessation Intervention: Patient screened for tobacco use and is an ex/non-smoker
Detail Level: Detailed
Quality 130: Documentation Of Current Medications In The Medical Record: Current Medications Documented

## 2025-02-07 ENCOUNTER — NURSE TRIAGE (OUTPATIENT)
Dept: OTHER | Facility: OTHER | Age: 55
End: 2025-02-07

## 2025-02-07 DIAGNOSIS — E66.813 CLASS 3 SEVERE OBESITY DUE TO EXCESS CALORIES WITH SERIOUS COMORBIDITY AND BODY MASS INDEX (BMI) OF 45.0 TO 49.9 IN ADULT (HCC): ICD-10-CM

## 2025-02-07 DIAGNOSIS — E66.01 CLASS 3 SEVERE OBESITY DUE TO EXCESS CALORIES WITH SERIOUS COMORBIDITY AND BODY MASS INDEX (BMI) OF 45.0 TO 49.9 IN ADULT (HCC): ICD-10-CM

## 2025-02-07 RX ORDER — TIRZEPATIDE 7.5 MG/.5ML
7.5 INJECTION, SOLUTION SUBCUTANEOUS WEEKLY
Qty: 2 ML | Refills: 0 | Status: SHIPPED | OUTPATIENT
Start: 2025-02-07

## 2025-02-08 NOTE — TELEPHONE ENCOUNTER
"Reason for Disposition   Caller with prescription and triager answers question    Answer Assessment - Initial Assessment Questions  1. DRUG NAME: \"What medicine do you need to have refilled?\"      Zepbound    2. REFILLS REMAINING: \"How many refills are remaining?\" (Note: The label on the medicine or pill bottle will show how many refills are remaining. If there are no refills remaining, then a renewal may be needed.)      Yes - was supposed to be called in today       4. PRESCRIBING HCP: \"Who prescribed it?\" Reason: If prescribed by specialist, call should be referred to that group.      Ulysses     Due for injection tomorrow    Protocols used: Medication Refill and Renewal Call-Adult-    "

## 2025-03-06 DIAGNOSIS — E66.813 CLASS 3 SEVERE OBESITY DUE TO EXCESS CALORIES WITH SERIOUS COMORBIDITY AND BODY MASS INDEX (BMI) OF 45.0 TO 49.9 IN ADULT (HCC): ICD-10-CM

## 2025-03-06 DIAGNOSIS — E66.01 CLASS 3 SEVERE OBESITY DUE TO EXCESS CALORIES WITH SERIOUS COMORBIDITY AND BODY MASS INDEX (BMI) OF 45.0 TO 49.9 IN ADULT (HCC): ICD-10-CM

## 2025-03-06 RX ORDER — TIRZEPATIDE 7.5 MG/.5ML
7.5 INJECTION, SOLUTION SUBCUTANEOUS WEEKLY
Qty: 2 ML | Refills: 0 | Status: CANCELLED | OUTPATIENT
Start: 2025-03-06

## 2025-03-06 NOTE — TELEPHONE ENCOUNTER
Patients spouse called because she needs a refill for   tirzepatide (Zepbound) 7.5 mg/0.5 mL auto-injector sent to the [facility] on file. Thank you.

## 2025-03-07 ENCOUNTER — TELEPHONE (OUTPATIENT)
Age: 55
End: 2025-03-07

## 2025-03-07 RX ORDER — TIRZEPATIDE 10 MG/.5ML
10 INJECTION, SOLUTION SUBCUTANEOUS WEEKLY
Qty: 2 ML | Refills: 1 | Status: SHIPPED | OUTPATIENT
Start: 2025-03-07

## 2025-03-07 NOTE — TELEPHONE ENCOUNTER
Pt aware.  RN called the pharm.  Med is there, pt must enroll in [facility] through her insurance before it can be dispenses.  Pt's  Иван aware.

## 2025-03-07 NOTE — TELEPHONE ENCOUNTER
Last note says she wanted to increase to 10 mg. Will send that in with a refill to last until I see her next month   no ST segment elevation or depression, TWI Lead III, V2

## 2025-03-10 NOTE — TELEPHONE ENCOUNTER
Patients spouse called regarding the message that the Zepbound medication is not covered ,spouse stated that the patient has been on the medication since 12/10/24. Patient has the same health insurance. Please advise.

## 2025-03-11 ENCOUNTER — TELEPHONE (OUTPATIENT)
Age: 55
End: 2025-03-11

## 2025-03-11 NOTE — TELEPHONE ENCOUNTER
Patients spouse Connor called back regarding the Zepbound stating he spoke with patients insurance and they are saying a prior authorization needs to be done.

## 2025-03-12 NOTE — TELEPHONE ENCOUNTER
Patients  called in to follow up on authorization. Advised medication was previously denied and Connor stated another one was submitted and patient is now 10 days without medication.   Please advise patient with update, thank you

## 2025-03-12 NOTE — TELEPHONE ENCOUNTER
PA Zepbound 10MG/0.5ML SUBMITTED     to [facility]     via    [x]CMM-KEY: [id number]  []Surescripts-Case ID #    []Availity-Auth ID #  NDC #    []Faxed to plan   []Other website    []Phone call Case ID #      []PA sent as URGENT    All office notes, labs and other pertaining documents and studies sent. Clinical questions answered. Awaiting determination from insurance company.     Turnaround time for your insurance to make a decision on your Prior Authorization can take 7-21 business days.

## 2025-03-17 ENCOUNTER — TELEPHONE (OUTPATIENT)
Dept: FAMILY MEDICINE CLINIC | Facility: CLINIC | Age: 55
End: 2025-03-17

## 2025-03-17 NOTE — TELEPHONE ENCOUNTER
Pt's  called and stated pt was without her Zepbound for two weeks due to prior auth issues. She is now having a lot of nausea since taking the first dose on Friday. Wants to know if there is something the provider can suggest or prescribe. Told Connor they can try dramamine at this time until we get a response from the provider.

## 2025-03-18 NOTE — TELEPHONE ENCOUNTER
If she was without meds then she needs to let us know so we can advise on dose.   If having a lot of nausea she will need to lower dose. Confirm if she is taking 10 mg and if she is we will need to send in 7.5 mg

## 2025-03-18 NOTE — TELEPHONE ENCOUNTER
Phone call placed to pt and reviewed message from . Pt states she would like to do one more shot of this dose and see how she does before decreasing. Pt does her shots on Friday nights. She will let us know next week if she has issues and would like to decrease.

## 2025-04-04 ENCOUNTER — PATIENT MESSAGE (OUTPATIENT)
Dept: FAMILY MEDICINE CLINIC | Facility: CLINIC | Age: 55
End: 2025-04-04

## 2025-04-10 ENCOUNTER — PATIENT MESSAGE (OUTPATIENT)
Dept: FAMILY MEDICINE CLINIC | Facility: CLINIC | Age: 55
End: 2025-04-10

## 2025-04-15 ENCOUNTER — TELEMEDICINE (OUTPATIENT)
Dept: FAMILY MEDICINE CLINIC | Facility: CLINIC | Age: 55
End: 2025-04-15
Payer: COMMERCIAL

## 2025-04-15 VITALS — HEIGHT: 65 IN | BODY MASS INDEX: 41.27 KG/M2 | WEIGHT: 247.7 LBS

## 2025-04-15 DIAGNOSIS — E53.8 VITAMIN B12 DEFICIENCY: ICD-10-CM

## 2025-04-15 DIAGNOSIS — R79.0 LOW FERRITIN: ICD-10-CM

## 2025-04-15 DIAGNOSIS — E55.9 VITAMIN D DEFICIENCY: ICD-10-CM

## 2025-04-15 DIAGNOSIS — Z98.84 S/P GASTRIC BYPASS: ICD-10-CM

## 2025-04-15 DIAGNOSIS — E78.00 HYPERCHOLESTEREMIA: ICD-10-CM

## 2025-04-15 DIAGNOSIS — E66.813 CLASS 3 SEVERE OBESITY DUE TO EXCESS CALORIES WITH SERIOUS COMORBIDITY AND BODY MASS INDEX (BMI) OF 45.0 TO 49.9 IN ADULT: Primary | ICD-10-CM

## 2025-04-15 DIAGNOSIS — D50.8 OTHER IRON DEFICIENCY ANEMIA: ICD-10-CM

## 2025-04-15 PROCEDURE — 99214 OFFICE O/P EST MOD 30 MIN: CPT | Performed by: FAMILY MEDICINE

## 2025-04-15 RX ORDER — TIRZEPATIDE 12.5 MG/.5ML
12.5 INJECTION, SOLUTION SUBCUTANEOUS WEEKLY
Qty: 2 ML | Refills: 0 | Status: SHIPPED | OUTPATIENT
Start: 2025-04-15

## 2025-04-15 RX ORDER — CLINDAMYCIN HYDROCHLORIDE 300 MG/1
300 CAPSULE ORAL EVERY 8 HOURS
COMMUNITY
Start: 2025-04-04

## 2025-04-15 RX ORDER — FEEDER CONTAINER WITH PUMP SET
1 EACH MISCELLANEOUS DAILY
Qty: 414 ML | Refills: 5 | Status: SHIPPED | OUTPATIENT
Start: 2025-04-15

## 2025-04-15 NOTE — PROGRESS NOTES
Virtual Regular Visit    Assessment/Plan:     Assessment & Plan  Class 3 severe obesity due to excess calories with serious comorbidity and body mass index (BMI) of 45.0 to 49.9 in adult (HCC)    Starting weight 12/10/24 285, BMI 48  Today's weight 4/15/2025 247, BMI 41  Weight loss 13%, 38 pounds    Weight loss has slowed   Pt would like to increase dose - next dose due in 2 -3 weeks   Work on increasing protein   Will add ensure if insurance covers   Update fasting blood work   Follow up in 3 months or so   Orders:  •  tirzepatide (Zepbound) 12.5 mg/0.5 mL auto-injector; Inject 0.5 mL (12.5 mg total) under the skin once a week    Hypercholesteremia         Other iron deficiency anemia  Update labs     Orders:  •  Nutritional Supplements (Ensure High Protein) LIQD; Take 1 Can by mouth daily Chocolate flavor.  •  Vitamin D 25 hydroxy; Future  •  Vitamin B12; Future  •  Lipid panel; Future  •  CBC and differential; Future  •  Ferritin; Future    Low ferritin  Taking over the counter iron daily. Update labs   Orders:  •  Nutritional Supplements (Ensure High Protein) LIQD; Take 1 Can by mouth daily Chocolate flavor.  •  Vitamin D 25 hydroxy; Future  •  Vitamin B12; Future  •  Lipid panel; Future  •  CBC and differential; Future  •  Ferritin; Future    Vitamin B12 deficiency  Update labs   Orders:  •  Nutritional Supplements (Ensure High Protein) LIQD; Take 1 Can by mouth daily Chocolate flavor.  •  Vitamin D 25 hydroxy; Future  •  Vitamin B12; Future  •  Lipid panel; Future  •  CBC and differential; Future  •  Ferritin; Future    Vitamin D deficiency  Taking over the counter 5000 iu daily   Update labs   Orders:  •  Nutritional Supplements (Ensure High Protein) LIQD; Take 1 Can by mouth daily Chocolate flavor.  •  Vitamin D 25 hydroxy; Future  •  Vitamin B12; Future  •  Lipid panel; Future  •  CBC and differential; Future  •  Ferritin; Future    S/P gastric bypass    Orders:  •  Nutritional Supplements (Ensure High  Protein) LIQD; Take 1 Can by mouth daily Chocolate flavor.  •  Vitamin D 25 hydroxy; Future  •  Vitamin B12; Future  •  Lipid panel; Future  •  CBC and differential; Future  •  Ferritin; Future         Return in about 3 months (around 7/15/2025) for virtual weight loss check up, after 10/31/25 physical .    Reason for visit is   Chief Complaint   Patient presents with   • Follow-up     Encounter provider Anushka Arora DO  Provider located at [facility]  [address]  [place] [facility] [address] [phone]    Recent Visits  No visits were found meeting these conditions.  Showing recent visits within past 7 days and meeting all other requirements  Today's Visits  Date Type Provider Dept   04/15/25 Telemedicine Anushka Arora DO [facility]   Showing today's visits and meeting all other requirements  Future Appointments  No visits were found meeting these conditions.  Showing future appointments within next 150 days and meeting all other requirements       The patient was identified by name and date of birth. Leticia Pedroza was informed that this is a telemedicine visit and that the visit is being conducted through the Epic Embedded platform. She agrees to proceed..  My office door was closed. No one else was in the room.  She acknowledged consent and understanding of privacy and security of the video platform. The patient has agreed to participate and understands they can discontinue the visit at any time.    Patient is currently located in the [facility]  Patient is currently located in a state in which I am licensed    Patient is aware this is a billable service.     Subjective  Leticia Pedroza is a 54 y.o. female is being seen via Video Visit today due to the COVID-19 pandemic.  Chief Complaint   Patient presents with   • Follow-up       Today's concerns are:      Not very hungry   Nighttimes feels like she wants something   Can go all day without eating  "  Tries to get 1/4 of meal     morning yogurt   Afternoon leftovers   Dinner potato, meat   Drinks iced tea arnold rollins light   cottage cheese and fruit      Zepbound started Body mass index is 48.92 kg/m².      Wt Readings from Last 1 Encounters:   12/10/24 129 kg (285 lb)        Trying to track protein - short on it   Vitals:    04/15/25 1812   Weight: 112 kg (247 lb 11.2 oz)   Height: 5' 5\" (1.651 m)     Wt Readings from Last 3 Encounters:   04/15/25 112 kg (247 lb 11.2 oz)   12/18/24 128 kg (282 lb)   12/10/24 129 kg (285 lb)     BP Readings from Last 3 Encounters:   12/18/24 128/78   10/31/24 128/84   04/18/24 160/79       PHQ-2/9 Depression Screening            Past Medical History:   Diagnosis Date   • Allergic    • Anemia    • Anxiety    • Arthritis    • Arthritis of neck     Got worse when appt was made VAS @ [facility] said I hainflamation   • Asthma     childhood   • Bursitis of hip    • Depression    • Environmental and seasonal allergies    • Fibromyalgia, primary     DX with this years ago.   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   • Low back pain     It got worse when appt. Was made.   • Memory loss    • Migraine    • Narcolepsy    • Obesity    • Plantar fasciitis     @ times wear different shoes and it resolves.   • Positive colorectal cancer screening using Cologuard test 07/26/2022   • Psoriatic arthritis (HCC)     Saw a drKwabena Years ago like 25 years tried giving meds nothing   • Rh incompatibility    • Sciatica     During pregnancy and a few times after.   • Trigger finger " 2019    This just started   • Urinary tract infection     Had one about 2 years ago   • Varicella    • Visual impairment     Wear glasses when needed     Past Surgical History:   Procedure Laterality Date   • BARIATRIC SURGERY     •  SECTION     • CHOLECYSTECTOMY     • COLON SURGERY     • COLONOSCOPY  10/10/2022   • FRACTURE SURGERY     • KNEE ARTHROSCOPY Left    • MELISSA-EN-Y PROCEDURE     • TUBAL LIGATION         Current Medications:  Current Outpatient Medications   Medication Sig Dispense Refill   • albuterol (Ventolin HFA) 90 mcg/act inhaler Inhale 2 puffs every 4 (four) hours as needed for wheezing 18 g 0   • ALPRAZolam (XANAX) 1 mg tablet Take 1 mg by mouth if needed     • cetirizine (ZyrTEC Allergy) 10 mg tablet      • Cholecalciferol (Vitamin D3) 125 MCG (5000 UT) CAPS Take 1 capsule (5,000 Units total) by mouth daily 90 capsule 2   • clindamycin (CLEOCIN) 300 MG capsule Take 300 mg by mouth every 8 (eight) hours     • clonazePAM (KlonoPIN) 1 mg tablet MAY TAKE WITH 2 MG TABLET BY MOUTH TWICE DAILY AS NEEDED     • clonazePAM (KlonoPIN) 2 mg tablet TAKE 1/2 TABLET BY MOUTH FOUR TIMES DAILY. MAY USE WITH ALPRAZOLAM     • Diclofenac Sodium (VOLTAREN) 1 % Apply 2 g topically 4 (four) times a day 150 g 1   • Ferrous Sulfate Dried ER (Slow Release Iron) 160 (50 Fe) MG TBCR Take 1 tablet by mouth in the morning 90 tablet 2   • fluticasone (Flovent HFA) 220 mcg/act inhaler Inhale 2 puffs 2 (two) times a day Rinse mouth after use. 12 g 0   • levalbuterol (Xopenex) 1.25 mg/3 mL nebulizer solution Take 3 mL (1.25 mg total) by nebulization every 6 (six) hours as needed for wheezing or shortness of breath 75 mL 0   • Nutritional Supplements (Ensure High Protein) LIQD Take 1 Can by mouth daily Chocolate flavor. 414 mL 5   • QUEtiapine (SEROquel) 100 mg tablet Take 100 mg by mouth     • QUEtiapine (SEROquel) 50 mg tablet      • Sodium Oxybate (Xyrem) 500 MG/ML SOLN First dose (bedtime): 4.5 g + Second dose  (2.5-4 hrs later): 4.5 g = 9 g Total Nightly Dose [Total Qty: 1 month(s)] 540 mL 5   • tirzepatide (Zepbound) 12.5 mg/0.5 mL auto-injector Inject 0.5 mL (12.5 mg total) under the skin once a week 2 mL 0   • valACYclovir (VALTREX) 1,000 mg tablet Take 2 tablets (2,000 mg total) by mouth 2 (two) times a day for 1 day 12 tablet 5   • venlafaxine (EFFEXOR) 100 MG tablet Take 1 tablet (100 mg total) by mouth 3 (three) times a day 90 tablet 0     No current facility-administered medications for this visit.        Allergies:  Allergies   Allergen Reactions   • Codeine Anaphylaxis, GI Intolerance and Chest Pain     heartburn   • Morphine Anaphylaxis, Chest Pain, Itching, Rash and Other (See Comments)     morphine   • Percocet [Oxycodone-Acetaminophen] Itching, Swelling and Chest Pain     Severe heartburn, redness and itching       Review of Systems  all others negative - no chest pain, SOB, normal urine and bowels. no GERD. sleeping well. mood good.     Physical Exam   Video Exam Pt not examined in person - seen over epic virtual video visit   Constitutional:  she appears well-developed and well-nourished.   HENT: Head: Normocephalic.   Right Ear: External ear normal.   Left Ear: External ear normal.   Nose: Nose normal.   Eyes: Pupils are equal, round, and reactive to light. Right eye exhibits no discharge. Left eye exhibits no discharge. No scleral icterus.   Neck: Normal range of motion.   Pulmonary/Chest: Effort normal. No respiratory distress.   Neurological: she is alert and oriented to person, place, and time.   Skin: Skin is warm and dry on face - no rashes. Not pale. Not diaphoretic.   Psychiatric: she  has a normal mood and affect. she behavior is normal. Thought content normal.       As a result of this visit, I have not referred the patient for further respiratory evaluation.    VIRTUAL VISIT DISCLAIMER    Leticia Pedroza acknowledges that she has consented to an online visit or consultation. She understands  that the online visit is based solely on information provided by her, and that, in the absence of a face-to-face physical evaluation by the physician, the diagnosis she receives is both limited and provisional in terms of accuracy and completeness. This is not intended to replace a full medical face-to-face evaluation by the physician. Leticia Pedroza understands and accepts these terms.

## 2025-04-15 NOTE — ASSESSMENT & PLAN NOTE
Update labs   Orders:  •  Nutritional Supplements (Ensure High Protein) LIQD; Take 1 Can by mouth daily Chocolate flavor.  •  Vitamin D 25 hydroxy; Future  •  Vitamin B12; Future  •  Lipid panel; Future  •  CBC and differential; Future  •  Ferritin; Future

## 2025-04-15 NOTE — ASSESSMENT & PLAN NOTE
Taking over the counter 5000 iu daily   Update labs   Orders:  •  Nutritional Supplements (Ensure High Protein) LIQD; Take 1 Can by mouth daily Chocolate flavor.  •  Vitamin D 25 hydroxy; Future  •  Vitamin B12; Future  •  Lipid panel; Future  •  CBC and differential; Future  •  Ferritin; Future

## 2025-04-15 NOTE — ASSESSMENT & PLAN NOTE
Starting weight 12/10/24 285, BMI 48  Today's weight 4/15/2025 247, BMI 41  Weight loss 13%, 38 pounds    Weight loss has slowed   Pt would like to increase dose - next dose due in 2 -3 weeks   Work on increasing protein   Will add ensure if insurance covers   Update fasting blood work   Follow up in 3 months or so   Orders:  •  tirzepatide (Zepbound) 12.5 mg/0.5 mL auto-injector; Inject 0.5 mL (12.5 mg total) under the skin once a week

## 2025-04-15 NOTE — ASSESSMENT & PLAN NOTE
Taking over the counter iron daily. Update labs   Orders:  •  Nutritional Supplements (Ensure High Protein) LIQD; Take 1 Can by mouth daily Chocolate flavor.  •  Vitamin D 25 hydroxy; Future  •  Vitamin B12; Future  •  Lipid panel; Future  •  CBC and differential; Future  •  Ferritin; Future

## 2025-04-15 NOTE — ASSESSMENT & PLAN NOTE
Orders:  •  Nutritional Supplements (Ensure High Protein) LIQD; Take 1 Can by mouth daily Chocolate flavor.  •  Vitamin D 25 hydroxy; Future  •  Vitamin B12; Future  •  Lipid panel; Future  •  CBC and differential; Future  •  Ferritin; Future

## 2025-04-28 ENCOUNTER — TELEPHONE (OUTPATIENT)
Dept: FAMILY MEDICINE CLINIC | Facility: CLINIC | Age: 55
End: 2025-04-28

## 2025-04-28 NOTE — TELEPHONE ENCOUNTER
Fax received from [employer] requesting prior authorization for   Zepbound 12.5mg. Patient instructions are Inject 0.5 mL (12.5 mg total) under the skin once a week,     Key not provided. Please call plan at [phone]  Please initiate prior authorization.

## 2025-04-29 NOTE — TELEPHONE ENCOUNTER
PA for ZEPBOUND 12.5 DENIED    Reason:(Screenshot if applicable)  Note from payer: Drug is not covered by plan       Message sent to office clinical pool Yes    Denial letter scanned into Media No ELECTRONIC RESPONSE    Appeal started No (Provider will need to decide if appeal is warranted and send clinical documentation to Prior Authorization Team for initiation.)    **Please follow up with your patient regarding denial and next steps**

## 2025-04-29 NOTE — TELEPHONE ENCOUNTER
Please let the patient know. It does not say why. She will need to contact her insurance company to find out why.

## 2025-05-01 NOTE — TELEPHONE ENCOUNTER
Patients  called office in regards to them prior auth.  I let him know that a message was sent via [employer] to his wife, but saw they did not read it yet.  I read message verbatim to the patients .  He expresses understanding and will call insurance to ask WHY not covered.  He stated he was just confused because they had to do this last month for the same medication and it was approved.    Patient will call back once they speak with insurance company

## 2025-05-27 ENCOUNTER — HOSPITAL ENCOUNTER (OUTPATIENT)
Dept: NON INVASIVE DIAGNOSTICS | Facility: CLINIC | Age: 55
Discharge: HOME | End: 2025-05-27
Attending: FAMILY MEDICINE
Payer: COMMERCIAL

## 2025-05-27 ENCOUNTER — RESULTS FOLLOW-UP (OUTPATIENT)
Dept: FAMILY MEDICINE CLINIC | Facility: CLINIC | Age: 55
End: 2025-05-27

## 2025-05-27 VITALS
BODY MASS INDEX: 41.15 KG/M2 | SYSTOLIC BLOOD PRESSURE: 128 MMHG | HEIGHT: 65 IN | DIASTOLIC BLOOD PRESSURE: 78 MMHG | WEIGHT: 247 LBS | HEART RATE: 81 BPM

## 2025-05-27 DIAGNOSIS — I77.810 AORTIC ECTASIA, THORACIC (HCC): ICD-10-CM

## 2025-05-27 LAB
AORTIC ROOT: 3 CM
ASCENDING AORTA: 3.8 CM
BSA FOR ECHO PROCEDURE: 2.16 M2
E WAVE DECELERATION TIME: 195 MS
E/A RATIO: 0.88
FRACTIONAL SHORTENING: 33 (ref 28–44)
INTERVENTRICULAR SEPTUM IN DIASTOLE (PARASTERNAL SHORT AXIS VIEW): 1.1 CM
INTERVENTRICULAR SEPTUM: 1.1 CM (ref 0.6–1.1)
LAAS-AP2: 18.7 CM2
LAAS-AP4: 18 CM2
LEFT ATRIUM SIZE: 3.7 CM
LEFT ATRIUM VOLUME (MOD BIPLANE): 46 ML
LEFT ATRIUM VOLUME INDEX (MOD BIPLANE): 21.2 ML/M2
LEFT INTERNAL DIMENSION IN SYSTOLE: 3 CM (ref 2.1–4)
LEFT VENTRICULAR INTERNAL DIMENSION IN DIASTOLE: 4.5 CM (ref 3.5–6)
LEFT VENTRICULAR POSTERIOR WALL IN END DIASTOLE: 1 CM
LEFT VENTRICULAR STROKE VOLUME: 57 ML
LV EF US.2D.A4C+ESTIMATED: 64 %
LVSV (TEICH): 57 ML
MV E'TISSUE VEL-LAT: 10 CM/S
MV E'TISSUE VEL-SEP: 6 CM/S
MV PEAK A VEL: 0.83 M/S
MV PEAK E VEL: 73 CM/S
MV STENOSIS PRESSURE HALF TIME: 57 MS
MV VALVE AREA P 1/2 METHOD: 3.86
RA PRESSURE ESTIMATED: 3 MMHG
RIGHT ATRIAL 2D VOLUME: 40 ML
RIGHT ATRIUM AREA SYSTOLE A4C: 15.6 CM2
RIGHT VENTRICLE ID DIMENSION: 3.7 CM
RV PSP: 31 MMHG
SINOTUBULAR JUNCTION: 2.7 CM
SL CV LEFT ATRIUM LENGTH A2C: 6 CM
SL CV LV EF: 65
SL CV PED ECHO LEFT VENTRICLE DIASTOLIC VOLUME (MOD BIPLANE) 2D: 93 ML
SL CV PED ECHO LEFT VENTRICLE SYSTOLIC VOLUME (MOD BIPLANE) 2D: 36 ML
SL CV SINUS OF VALSALVA 2D: 3.3 CM
STJ: 2.7 CM
TR MAX PG: 28 MMHG
TR PEAK VELOCITY: 2.7 M/S
TRICUSPID ANNULAR PLANE SYSTOLIC EXCURSION: 2 CM
TRICUSPID VALVE PEAK REGURGITATION VELOCITY: 2.67 M/S

## 2025-05-27 PROCEDURE — 93306 TTE W/DOPPLER COMPLETE: CPT

## 2025-05-27 PROCEDURE — 93306 TTE W/DOPPLER COMPLETE: CPT | Performed by: INTERNAL MEDICINE

## 2025-06-03 ENCOUNTER — PATIENT MESSAGE (OUTPATIENT)
Dept: FAMILY MEDICINE CLINIC | Facility: CLINIC | Age: 55
End: 2025-06-03

## 2025-06-03 DIAGNOSIS — E66.813 CLASS 3 SEVERE OBESITY DUE TO EXCESS CALORIES WITH SERIOUS COMORBIDITY AND BODY MASS INDEX (BMI) OF 45.0 TO 49.9 IN ADULT: ICD-10-CM

## 2025-06-03 DIAGNOSIS — E66.813 CLASS 3 SEVERE OBESITY DUE TO EXCESS CALORIES WITH SERIOUS COMORBIDITY AND BODY MASS INDEX (BMI) OF 45.0 TO 49.9 IN ADULT: Primary | ICD-10-CM

## 2025-06-04 RX ORDER — TIRZEPATIDE 15 MG/.5ML
15 INJECTION, SOLUTION SUBCUTANEOUS WEEKLY
Qty: 2 ML | Refills: 2 | Status: SHIPPED | OUTPATIENT
Start: 2025-06-04

## 2025-06-04 RX ORDER — TIRZEPATIDE 12.5 MG/.5ML
12.5 INJECTION, SOLUTION SUBCUTANEOUS WEEKLY
Qty: 2 ML | Refills: 0 | OUTPATIENT
Start: 2025-06-04

## 2025-06-27 ENCOUNTER — PATIENT MESSAGE (OUTPATIENT)
Dept: FAMILY MEDICINE CLINIC | Facility: CLINIC | Age: 55
End: 2025-06-27

## 2025-06-27 DIAGNOSIS — J45.20 MILD INTERMITTENT ASTHMA WITHOUT COMPLICATION: Primary | ICD-10-CM

## 2025-06-27 RX ORDER — LEVALBUTEROL TARTRATE 45 UG/1
1-2 AEROSOL, METERED ORAL EVERY 4 HOURS PRN
Qty: 15 G | Refills: 1 | Status: SHIPPED | OUTPATIENT
Start: 2025-06-27 | End: 2025-06-30 | Stop reason: SDUPTHER

## 2025-06-30 RX ORDER — LEVALBUTEROL TARTRATE 45 UG/1
1-2 AEROSOL, METERED ORAL EVERY 4 HOURS PRN
Qty: 15 G | Refills: 1 | Status: SHIPPED | OUTPATIENT
Start: 2025-06-30 | End: 2025-06-30 | Stop reason: SDUPTHER

## 2025-06-30 RX ORDER — LEVALBUTEROL TARTRATE 45 UG/1
1-2 AEROSOL, METERED ORAL EVERY 4 HOURS PRN
Qty: 15 G | Refills: 1 | Status: SHIPPED | OUTPATIENT
Start: 2025-06-30

## 2025-07-02 ENCOUNTER — ANNUAL EXAM (OUTPATIENT)
Dept: OBGYN CLINIC | Facility: CLINIC | Age: 55
End: 2025-07-02
Payer: COMMERCIAL

## 2025-07-02 VITALS
BODY MASS INDEX: 38.65 KG/M2 | WEIGHT: 232 LBS | SYSTOLIC BLOOD PRESSURE: 120 MMHG | DIASTOLIC BLOOD PRESSURE: 82 MMHG | HEIGHT: 65 IN

## 2025-07-02 DIAGNOSIS — Z12.31 ENCOUNTER FOR SCREENING MAMMOGRAM FOR MALIGNANT NEOPLASM OF BREAST: ICD-10-CM

## 2025-07-02 DIAGNOSIS — Z01.419 ENCOUNTER FOR GYNECOLOGICAL EXAMINATION WITHOUT ABNORMAL FINDING: Primary | ICD-10-CM

## 2025-07-02 PROCEDURE — S0612 ANNUAL GYNECOLOGICAL EXAMINA: HCPCS | Performed by: OBSTETRICS & GYNECOLOGY

## 2025-07-02 NOTE — PROGRESS NOTES
Subjective      Leticia Pedroza is a 55 y.o. G3, P2 female who presents for annual well woman exam.  Patient reports about 1 year of no menses and no bleeding.  Patient reports no bleeding, spotting, or discharge.  Patient reports Yes  hot flashes/night sweats, not sexually active with , No vaginal dryness, sleeping fairly well with sleeping medication.   Patient has lost over 50 pounds on Zepbound is doing well and tolerating well.    Current contraception: post menopausal status  History of abnormal Pap smear: yes -history of colpo, was very painful for patient  Family history of uterine or ovarian cancer: no  Regular self breast exam: yes  History of abnormal mammogram: no  Family history of breast cancer: no    Menstrual History:  OB History          3    Para   3    Term   2       1    AB        Living   2         SAB        IAB        Ectopic        Multiple        Live Births   2                Patient's last menstrual period was 10/05/2022.       The following portions of the patient's history were reviewed and updated as appropriate: allergies, current medications, past family history, past medical history, past social history, past surgical history, and problem list.  Past Medical History[1]  Past Surgical History[2]  OB History          3    Para   3    Term   2       1    AB        Living   2         SAB        IAB        Ectopic        Multiple        Live Births   2                 Review of Systems  Review of Systems   Constitutional: Negative.  Negative for chills, fatigue, fever and unexpected weight change.   HENT: Negative.  Negative for dental problem, sinus pressure and sinus pain.    Eyes: Negative.  Negative for visual disturbance.   Respiratory: Negative.  Negative for cough, shortness of breath and wheezing.         Asthma   Cardiovascular: Negative.  Negative for chest pain and leg swelling.   Gastrointestinal: Negative.  Negative for constipation,  "diarrhea, nausea and vomiting.   Genitourinary: Negative.  Negative for menstrual problem, pelvic pain and urgency.   Musculoskeletal: Negative.  Negative for back pain.   Allergic/Immunologic: Positive for environmental allergies.   Neurological:  Positive for headaches. Negative for dizziness.     Objective     Vitals:    07/02/25 1126   BP: 120/82   BP Location: Left arm   Patient Position: Sitting   Cuff Size: Large   Weight: 105 kg (232 lb)   Height: 5' 5\" (1.651 m)     General:   alert and oriented, in no acute distress   Heart: regular rate and rhythm, S1, S2 normal, no murmur, click, rub or gallop   Lungs: clear to auscultation bilaterally   Abdomen: soft, non-tender, without masses or organomegaly   Vulva: normal   Vagina: normal mucosa   Cervix: no cervical motion tenderness and no lesions   Uterus: normal size, mobile, non-tender   Adnexa: normal adnexa and no mass, fullness, tenderness   Breast inspection negative, no nipple discharge or bleeding, no masses or nodularity palpable  Rectal deferred, just had colonoscopy last year  Anus/Perineum: Normal  Thyroid normal no masses or nodules    Assessment & Plan  Encounter for gynecological examination without abnormal finding  55-year-old G3, P2 here for annual exam.  Patient feels at least a year with no menses possible longer.  Having some significant hot flashes and night sweats but overall tolerating well.  Last Pap March 2022 normal, last mammogram January 2025 normal, had positive Cologuard with normal colonoscopy last year good for 10 years.  Has lost significant weight on Zepbound, generally doing well.  Return in 1 year or sooner as needed plan for Pap at that time.                   [1]   Past Medical History:  Diagnosis Date    Allergic     Anemia     Anxiety     Arthritis     Arthritis of neck     Got worse when appt was made VAS @ [facility] said I hainflamation    Asthma     childhood    Bursitis of hip     Depression     Environmental and seasonal " allergies     Fibromyalgia, primary     DX with this years ago.    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    Low back pain     It got worse when appt. Was made.    Memory loss     Migraine     Narcolepsy     Obesity     Plantar fasciitis     @ times wear different shoes and it resolves.    Positive colorectal cancer screening using Cologuard test 2022    Psoriatic arthritis (HCC)     Saw a  Years ago like 25 years tried giving meds nothing    Rh incompatibility     Sciatica     During pregnancy and a few times after.    Trigger finger 2019    This just started    Urinary tract infection     Had one about 2 years ago    Varicella     Visual impairment     Wear glasses when needed   [2]   Past Surgical History:  Procedure Laterality Date    BARIATRIC SURGERY       SECTION      CHOLECYSTECTOMY      COLON SURGERY      COLONOSCOPY  10/10/2022    FRACTURE SURGERY      KNEE ARTHROSCOPY Left     MELISSA-EN-Y PROCEDURE  2010    TUBAL LIGATION

## 2025-07-03 ENCOUNTER — PATIENT MESSAGE (OUTPATIENT)
Dept: FAMILY MEDICINE CLINIC | Facility: CLINIC | Age: 55
End: 2025-07-03

## 2025-07-07 ENCOUNTER — TELEPHONE (OUTPATIENT)
Age: 55
End: 2025-07-07

## 2025-07-07 NOTE — TELEPHONE ENCOUNTER
PA for levalbuterol (Xopenex HFA) 45 mcg/actSUBMITTED to [facility]     via      [x]ExecMobile-Case ID # [id number]       [x]PA sent as URGENT    All office notes, labs and other pertaining documents and studies sent. Clinical questions answered. Awaiting determination from insurance company.     Turnaround time for your insurance to make a decision on your Prior Authorization can take 7-21 business days.

## 2025-07-07 NOTE — TELEPHONE ENCOUNTER
PA for  levalbuterol (Xopenex HFA) 45 mcg/act  APPROVED     Date(s) approved June 7, 2025 to July 7, 2026     Case #[id number]     Patient advised by          [][employer] The[employer]ieshart Message  [x]Phone call   []LMOM  []L/M to call office as no active Communication consent on file  []Unable to leave detailed message as VM not approved on Communication consent       Pharmacy advised by    [x]Fax  []Phone call  []Secure Chat    Specialty Pharmacy    []     Approval letter scanned into Media Yes

## 2025-07-11 ENCOUNTER — HOSPITAL ENCOUNTER (OUTPATIENT)
Dept: CT IMAGING | Facility: HOSPITAL | Age: 55
Discharge: HOME | End: 2025-07-11
Attending: FAMILY MEDICINE
Payer: COMMERCIAL

## 2025-07-11 DIAGNOSIS — I77.810 AORTIC ECTASIA, THORACIC (HCC): ICD-10-CM

## 2025-07-11 PROCEDURE — 71260 CT THORAX DX C+: CPT

## 2025-07-11 RX ADMIN — IOHEXOL 90 ML: 350 INJECTION, SOLUTION INTRAVENOUS at 16:23

## 2025-07-21 ENCOUNTER — TELEMEDICINE (OUTPATIENT)
Dept: FAMILY MEDICINE CLINIC | Facility: CLINIC | Age: 55
End: 2025-07-21
Payer: COMMERCIAL

## 2025-07-21 VITALS — BODY MASS INDEX: 37.61 KG/M2 | WEIGHT: 226 LBS

## 2025-07-21 DIAGNOSIS — Z98.84 S/P GASTRIC BYPASS: ICD-10-CM

## 2025-07-21 DIAGNOSIS — E66.01 CLASS 2 SEVERE OBESITY DUE TO EXCESS CALORIES WITH SERIOUS COMORBIDITY AND BODY MASS INDEX (BMI) OF 37.0 TO 37.9 IN ADULT (HCC): Primary | ICD-10-CM

## 2025-07-21 DIAGNOSIS — I77.810 AORTIC ECTASIA, THORACIC (HCC): ICD-10-CM

## 2025-07-21 DIAGNOSIS — E66.812 CLASS 2 SEVERE OBESITY DUE TO EXCESS CALORIES WITH SERIOUS COMORBIDITY AND BODY MASS INDEX (BMI) OF 37.0 TO 37.9 IN ADULT (HCC): Primary | ICD-10-CM

## 2025-07-21 DIAGNOSIS — E66.813 CLASS 3 SEVERE OBESITY DUE TO EXCESS CALORIES WITH SERIOUS COMORBIDITY AND BODY MASS INDEX (BMI) OF 45.0 TO 49.9 IN ADULT: ICD-10-CM

## 2025-07-21 DIAGNOSIS — B00.1 RECURRENT COLD SORES: ICD-10-CM

## 2025-07-21 PROCEDURE — 99214 OFFICE O/P EST MOD 30 MIN: CPT | Performed by: FAMILY MEDICINE

## 2025-07-21 RX ORDER — TIRZEPATIDE 15 MG/.5ML
15 INJECTION, SOLUTION SUBCUTANEOUS WEEKLY
Qty: 2 ML | Refills: 5 | Status: SHIPPED | OUTPATIENT
Start: 2025-07-21

## 2025-07-21 RX ORDER — VALACYCLOVIR HYDROCHLORIDE 1 G/1
2000 TABLET, FILM COATED ORAL 2 TIMES DAILY
Qty: 12 TABLET | Refills: 5 | Status: SHIPPED | OUTPATIENT
Start: 2025-07-21 | End: 2025-07-22

## 2025-07-21 NOTE — ASSESSMENT & PLAN NOTE
Pt doing well on Zepbound. Denies side effects.   Continue high protein diet, aiming for 100 - 120 grams per day   Continue Zepbound 15 mg daily.   Starting weight 12/10/24 285, BMI 48  4/15/25  247, BMI 41 - Weight loss 13%, 38 pounds  7/21/2025  weight 226, BMI 37 - weight loss 55 pounds, 19.5%

## 2025-07-21 NOTE — PROGRESS NOTES
Virtual Regular Visit  Name: Leticia Pedroza      : 1970      MRN: [id number]  Encounter Provider: Anushka Arora DO  Encounter Date: 2025   Encounter department: [facility]    Assessment/Plan:   :  Assessment & Plan  Class 2 severe obesity due to excess calories with serious comorbidity and body mass index (BMI) of 37.0 to 37.9 in adult (HCC)  Pt doing well on Zepbound. Denies side effects.   Continue high protein diet, aiming for 100 - 120 grams per day   Continue Zepbound 15 mg daily.   Starting weight 12/10/24 285, BMI 48  4/15/25  247, BMI 41 - Weight loss 13%, 38 pounds  2025  weight 226, BMI 37 - weight loss 55 pounds, 19.5%               Recurrent cold sores  Refill   Orders:    valACYclovir (VALTREX) 1,000 mg tablet; Take 2 tablets (2,000 mg total) by mouth 2 (two) times a day for 1 day    Aortic ectasia, thoracic (HCC)  CT scan and echo reassuring        S/P gastric bypass              Assessment & Plan          Follow up:   No follow-ups on file.    Subjective    Leticia Pedroza is a 55 y.o. female is being seen via Video Visit today.  Reason for visit is   Chief Complaint   Patient presents with    Follow-up       Today's concerns are:    She is doing well   2 meals a day   Wants to lose weight   Chicken, waffles last night a lot of chicken   Lunch skips often   Breakfast drinks Core Protein 42 grams   Moving bowels every 2 -3 days   Joint pain noted - knees hips   Walking some   No swelling, red or hot   Itching on her back, no rash     Using xyrem 2 -3 at night, 2 -3 in am Dr. Peralta     Bariatric vitamin one per day     Had echo and CT scan         Vitals:    25 1529   Weight: 103 kg (226 lb)     Wt Readings from Last 3 Encounters:   25 103 kg (226 lb)   25 105 kg (232 lb)   25 112 kg (247 lb)     BP Readings from Last 3 Encounters:   25 120/82   25 128/78   24 128/78       PHQ-2/9 Depression Screening                 Current Medications:  Current Medications[1]     Allergies:  Allergies[2]    Review of Systems  all others negative - no chest pain, SOB, normal urine and bowels. no GERD. sleeping well. mood good. + joint pains     Objective   Wt 103 kg (226 lb)   LMP 10/05/2022 Comment: , pregnancy test not needed as per anesthesia  BMI 37.61 kg/m²     Physical Exam  Video Exam Pt not examined in person - seen over epic virtual video visit   Constitutional:  she appears well-developed and well-nourished.   HENT: Head: Normocephalic.   Right Ear: External ear normal.   Left Ear: External ear normal.   Nose: Nose normal.   Eyes: Pupils are equal, round, and reactive to light. Right eye exhibits no discharge. Left eye exhibits no discharge. No scleral icterus.   Neck: Normal range of motion.   Pulmonary/Chest: Effort normal. No respiratory distress.   Neurological: she is alert and oriented to person, place, and time.   Skin: Skin is warm and dry on face - no rashes. Not pale. Not diaphoretic.   Psychiatric: she  has a normal mood and affect. she behavior is normal. Thought content normal.       Administrative Statements   Encounter provider Anushka Arora, DO    The Patient is located at home  and in the following state in which I hold an active license [place].    The patient was identified by name and date of birth. Leticia Pedroza was informed that this is a telemedicine visit and that the visit is being conducted through the Epic Embedded platform. She agrees to proceed..  My office door was closed. No one else was in the room.  She acknowledged consent and understanding of privacy and security of the video platform. The patient has agreed to participate and understands they can discontinue the visit at any time.            [1]   Current Outpatient Medications   Medication Sig Dispense Refill    albuterol (Ventolin HFA) 90 mcg/act inhaler Inhale 2 puffs every 4 (four) hours as needed for wheezing 18 g 0    ALPRAZolam  (XANAX) 1 mg tablet Take 1 mg by mouth if needed      cetirizine (ZyrTEC Allergy) 10 mg tablet       Cholecalciferol (Vitamin D3) 125 MCG (5000 UT) CAPS Take 1 capsule (5,000 Units total) by mouth daily 90 capsule 2    clindamycin (CLEOCIN) 300 MG capsule Take 300 mg by mouth every 8 (eight) hours      clonazePAM (KlonoPIN) 1 mg tablet       clonazePAM (KlonoPIN) 2 mg tablet       Ferrous Sulfate Dried ER (Slow Release Iron) 160 (50 Fe) MG TBCR Take 1 tablet by mouth in the morning 90 tablet 2    fluticasone (Flovent HFA) 220 mcg/act inhaler Inhale 2 puffs 2 (two) times a day Rinse mouth after use. 12 g 0    levalbuterol (Xopenex) 1.25 mg/3 mL nebulizer solution Take 3 mL (1.25 mg total) by nebulization every 6 (six) hours as needed for wheezing or shortness of breath 75 mL 0    QUEtiapine (SEROquel) 100 mg tablet Take 100 mg by mouth      QUEtiapine (SEROquel) 50 mg tablet       Sodium Oxybate (Xyrem) 500 MG/ML SOLN First dose (bedtime): 4.5 g + Second dose (2.5-4 hrs later): 4.5 g = 9 g Total Nightly Dose [Total Qty: 1 month(s)] 540 mL 5    valACYclovir (VALTREX) 1,000 mg tablet Take 2 tablets (2,000 mg total) by mouth 2 (two) times a day for 1 day 12 tablet 5    venlafaxine (EFFEXOR) 100 MG tablet Take 1 tablet (100 mg total) by mouth 3 (three) times a day 90 tablet 0    Diclofenac Sodium (VOLTAREN) 1 % Apply 2 g topically 4 (four) times a day (Patient not taking: Reported on 7/21/2025) 150 g 1    levalbuterol (Xopenex HFA) 45 mcg/act inhaler Inhale 1-2 puffs every 4 (four) hours as needed for wheezing (Patient not taking: Reported on 7/21/2025) 15 g 1    Nutritional Supplements (Ensure High Protein) LIQD Take 1 Can by mouth daily Chocolate flavor. (Patient not taking: Reported on 7/21/2025) 414 mL 5    tirzepatide (Zepbound) 15 mg/0.5 mL auto-injector Inject 0.5 mL (15 mg total) under the skin once a week 2 mL 2     No current facility-administered medications for this visit.   [2]   Allergies  Allergen  Reactions    Codeine Anaphylaxis, GI Intolerance and Chest Pain     heartburn    Morphine Anaphylaxis, Chest Pain, Itching, Rash and Other (See Comments)     morphine    Percocet [Oxycodone-Acetaminophen] Itching, Swelling and Chest Pain     Severe heartburn, redness and itching